# Patient Record
Sex: MALE | Race: WHITE | NOT HISPANIC OR LATINO | Employment: FULL TIME | URBAN - METROPOLITAN AREA
[De-identification: names, ages, dates, MRNs, and addresses within clinical notes are randomized per-mention and may not be internally consistent; named-entity substitution may affect disease eponyms.]

---

## 2017-03-06 ENCOUNTER — OFFICE VISIT (OUTPATIENT)
Dept: FAMILY MEDICINE | Facility: CLINIC | Age: 52
End: 2017-03-06

## 2017-03-06 VITALS
HEART RATE: 107 BPM | WEIGHT: 197 LBS | OXYGEN SATURATION: 96 % | BODY MASS INDEX: 26.11 KG/M2 | HEIGHT: 73 IN | SYSTOLIC BLOOD PRESSURE: 138 MMHG | DIASTOLIC BLOOD PRESSURE: 80 MMHG

## 2017-03-06 DIAGNOSIS — Z12.5 SCREENING FOR PROSTATE CANCER: ICD-10-CM

## 2017-03-06 DIAGNOSIS — I10 BENIGN ESSENTIAL HYPERTENSION: ICD-10-CM

## 2017-03-06 DIAGNOSIS — J38.3 ABDUCTOR SPASMODIC DYSPHONIA: ICD-10-CM

## 2017-03-06 DIAGNOSIS — E78.5 HYPERLIPIDEMIA WITH TARGET LDL LESS THAN 100: ICD-10-CM

## 2017-03-06 DIAGNOSIS — Z00.00 ROUTINE GENERAL MEDICAL EXAMINATION AT A HEALTH CARE FACILITY: Primary | ICD-10-CM

## 2017-03-06 DIAGNOSIS — Z11.59 NEED FOR HEPATITIS C SCREENING TEST: ICD-10-CM

## 2017-03-06 LAB
% GRANULOCYTES: 62.1 % (ref 42.2–75.2)
HCT VFR BLD AUTO: 47.4 % (ref 39–51)
HEMOGLOBIN: 15.6 G/DL (ref 13.4–17.5)
LYMPHOCYTES NFR BLD AUTO: 28.6 % (ref 20.5–51.1)
MCH RBC QN AUTO: 31.2 PG (ref 27–31)
MCHC RBC AUTO-ENTMCNC: 32.9 G/DL (ref 33–37)
MCV RBC AUTO: 94.9 FL (ref 80–100)
MONOCYTES NFR BLD AUTO: 9.3 % (ref 1.7–9.3)
PLATELET # BLD AUTO: 196 K/UL (ref 140–450)
RBC # BLD AUTO: 4.99 X10/CMM (ref 4.2–5.9)
WBC # BLD AUTO: 2.7 X10/CMM (ref 3.8–11)

## 2017-03-06 PROCEDURE — 84153 ASSAY OF PSA TOTAL: CPT | Mod: 90 | Performed by: FAMILY MEDICINE

## 2017-03-06 PROCEDURE — 36415 COLL VENOUS BLD VENIPUNCTURE: CPT | Performed by: FAMILY MEDICINE

## 2017-03-06 PROCEDURE — 99213 OFFICE O/P EST LOW 20 MIN: CPT | Mod: 25 | Performed by: FAMILY MEDICINE

## 2017-03-06 PROCEDURE — 86803 HEPATITIS C AB TEST: CPT | Mod: 90 | Performed by: FAMILY MEDICINE

## 2017-03-06 PROCEDURE — 80053 COMPREHEN METABOLIC PANEL: CPT | Mod: 90 | Performed by: FAMILY MEDICINE

## 2017-03-06 PROCEDURE — 85025 COMPLETE CBC W/AUTO DIFF WBC: CPT | Performed by: FAMILY MEDICINE

## 2017-03-06 PROCEDURE — 80061 LIPID PANEL: CPT | Mod: 90 | Performed by: FAMILY MEDICINE

## 2017-03-06 PROCEDURE — 99396 PREV VISIT EST AGE 40-64: CPT | Performed by: FAMILY MEDICINE

## 2017-03-06 RX ORDER — LISINOPRIL/HYDROCHLOROTHIAZIDE 10-12.5 MG
1 TABLET ORAL DAILY
Qty: 90 TABLET | Refills: 3 | Status: SHIPPED | OUTPATIENT
Start: 2017-03-06 | End: 2019-03-15

## 2017-03-06 NOTE — MR AVS SNAPSHOT
After Visit Summary   3/6/2017    Yimi Gibbs    MRN: [id number]           Patient Information     Date Of Birth          1965        Visit Information        Provider Department      3/6/2017 12:30 PM Omar Matias MD [facility]        Today's Diagnoses     Routine general medical examination at a health care facility    -  1    Abductor spasmodic dysphonia        Hyperlipidemia with target LDL less than 100        Benign essential hypertension        Screening for prostate cancer          Care Instructions      Preventive Health Recommendations  Male Ages 50 - 64    Yearly exam:             See your health care provider every year in order to  o   Review health changes.   o   Discuss preventive care.    o   Review your medicines if your doctor has prescribed any.     Have a cholesterol test every 5 years, or more frequently if you are at risk for high cholesterol/heart disease.     Have a diabetes test (fasting glucose) every three years. If you are at risk for diabetes, you should have this test more often.     Have a colonoscopy at age 50, or have a yearly FIT test (stool test). These exams will check for colon cancer.      Talk with your health care provider about whether or not a prostate cancer screening test (PSA) is right for you.    You should be tested each year for STDs (sexually transmitted diseases), if you re at risk.     Shots: Get a flu shot each year. Get a tetanus shot every 10 years.     Nutrition:    Eat at least 5 servings of fruits and vegetables daily.     Eat whole-grain bread, whole-wheat pasta and brown rice instead of white grains and rice.     Talk to your provider about Calcium and Vitamin D.     Lifestyle    Exercise for at least 150 minutes a week (30 minutes a day, 5 days a week). This will help you control your weight and prevent disease.     Limit alcohol to one drink per day.     No smoking.     Wear sunscreen to prevent skin cancer.  "    See your dentist every six months for an exam and cleaning.     See your eye doctor every 1 to 2 years.          Follow-ups after your visit        Who to contact     If you have questions or need follow up information about today's clinic visit or your schedule please contact [facility] directly at [phone].  Normal or non-critical lab and imaging results will be communicated to you by MyChart, letter or phone within 4 business days after the clinic has received the results. If you do not hear from us within 7 days, please contact the clinic through [employer] or phone. If you have a critical or abnormal lab result, we will notify you by phone as soon as possible.  Submit refill requests through [employer] or call your pharmacy and they will forward the refill request to us. Please allow 3 business days for your refill to be completed.          Additional Information About Your Visit        MyChart Information     [employer] gives you secure access to your electronic health record. If you see a primary care provider, you can also send messages to your care team and make appointments. If you have questions, please call your primary care clinic.  If you do not have a primary care provider, please call [phone] and they will assist you.        Care EveryWhere ID     This is your Care EveryWhere ID. This could be used by other organizations to access your [place] medical records  [id number]        Your Vitals Were     Pulse Height Pulse Oximetry BMI (Body Mass Index)          107 1.842 m (6' 0.5\") 96% 26.35 kg/m2         Blood Pressure from Last 3 Encounters:   03/06/17 138/80   10/21/16 112/70   05/20/16 124/80    Weight from Last 3 Encounters:   03/06/17 89.4 kg (197 lb)   10/21/16 90.7 kg (200 lb)   05/20/16 88.5 kg (195 lb)              We Performed the Following     CBC with Diff/Plt ([facility])     Comp. Metabolic Panel (14) ([facility])     Lipid Panel ([facility])     PSA Serum ([facility])          Where " to get your medicines      These medications were sent to [id number] - [place], [place] - [address] [facility]  [address] [place] [id number]     Phone:  [phone]     lisinopril-hydrochlorothiazide 10-12.5 MG per tablet          Primary Care Provider Office Phone # Fax #    Omar Matias [phone]       [facility] [address]  [facility] [address]        Thank you!     Thank you for choosing [facility]  for your care. Our goal is always to provide you with excellent care. Hearing back from our patients is one way we can continue to improve our services. Please take a few minutes to complete the written survey that you may receive in the mail after your visit with us. Thank you!             Your Updated Medication List - Protect others around you: Learn how to safely use, store and throw away your medicines at www.disposemymeds.org.          This list is accurate as of: 3/6/17  1:02 PM.  Always use your most recent med list.                   Brand Name Dispense Instructions for use    ibuprofen 800 MG tablet    ADVIL/MOTRIN    30 tablet    Take 1 tablet (800 mg) by mouth every 8 hours as needed for moderate pain       lisinopril-hydrochlorothiazide 10-12.5 MG per tablet    PRINZIDE/ZESTORETIC    90 tablet    Take 1 tablet by mouth daily

## 2017-03-06 NOTE — PROGRESS NOTES
SUBJECTIVE:     CC: Yimi Gibbs is an 51 year old male who presents for preventative health visit.     Healthy Habits:    Do you get at least three servings of calcium containing foods daily (dairy, green leafy vegetables, etc.)? yes    Amount of exercise or daily activities, outside of work: 4 day(s) per week    Problems taking medications regularly No    Medication side effects: No    Have you had an eye exam in the past two years? yes    Do you see a dentist twice per year? yes    Do you have sleep apnea, excessive snoring or daytime drowsiness?no            Today's PHQ-2 Score:   PHQ-2 ( 1999 Pfizer) 3/6/2017 11/30/2015   Q1: Little interest or pleasure in doing things - 0   Q2: Feeling down, depressed or hopeless 0 0   PHQ-2 Score - 0       Abuse: Current or Past(Physical, Sexual or Emotional)- No  Do you feel safe in your environment - Yes    Social History   Substance Use Topics     Smoking status: Never Smoker     Smokeless tobacco: Never Used     Alcohol use Yes      Comment: socially     The patient does not drink >3 drinks per day nor >7 drinks per week.    Last PSA: No results found for: PSA    Recent Labs   Lab Test  05/20/16   0841  11/30/15   1125   CHOL  219*  245*   HDL  58  57   LDL  142*  170*   TRIG  94  89       Reviewed orders with patient. Reviewed health maintenance and updated orders accordingly - Yes    Reviewed and updated as needed this visit by clinical staff  Tobacco  Allergies  Meds         Reviewed and updated as needed this visit by Provider        Past Medical History   Diagnosis Date     Abductor spasmodic dysphonia      Hyperlipidaemia LDL goal < 100      Hypertension       Past Surgical History   Procedure Laterality Date     Bunionectomy  2010     Septoplasty  2007     Thyroplasty  2001       ROS:  C: NEGATIVE for fever, chills, change in weight  I: NEGATIVE for worrisome rashes, moles or lesions  E: NEGATIVE for vision changes or irritation  ENT: NEGATIVE for  "ear, mouth and throat problems  R: NEGATIVE for significant cough or SOB  CV: NEGATIVE for chest pain, palpitations or peripheral edema  GI: NEGATIVE for nausea, abdominal pain, heartburn, or change in bowel habits   male: negative for dysuria, hematuria, decreased urinary stream, erectile dysfunction, urethral discharge  M: NEGATIVE for significant arthralgias or myalgia  N: NEGATIVE for weakness, dizziness or paresthesias  P: NEGATIVE for changes in mood or affect    Patient Active Problem List   Diagnosis     Abductor spasmodic dysphonia     Hyperlipidemia with target LDL less than 100     Dysphonia     Past Surgical History   Procedure Laterality Date     Bunionectomy  2010     Septoplasty  2007     Thyroplasty  2001       Social History   Substance Use Topics     Smoking status: Never Smoker     Smokeless tobacco: Never Used     Alcohol use Yes      Comment: socially     Family History   Problem Relation Age of Onset     Prostate Cancer Mother          OBJECTIVE:     /80  Pulse 107  Ht 1.842 m (6' 0.5\")  Wt 89.4 kg (197 lb)  SpO2 96%  BMI 26.35 kg/m2  EXAM:  GENERAL: healthy, alert and no distress  EYES: Eyes grossly normal to inspection, PERRL and conjunctivae and sclerae normal  HENT: ear canals and TM's normal, nose and mouth without ulcers or lesions  NECK: no adenopathy, no asymmetry, masses, or scars and thyroid normal to palpation  RESP: lungs clear to auscultation - no rales, rhonchi or wheezes  BREAST: normal without masses, tenderness or nipple discharge and no palpable axillary masses or adenopathy  CV: regular rate and rhythm, normal S1 S2, no S3 or S4, no murmur, click or rub, no peripheral edema and peripheral pulses strong  ABDOMEN: soft, nontender, no hepatosplenomegaly, no masses and bowel sounds normal   (male): normal male genitalia without lesions or urethral discharge, no hernia  RECTAL: normal sphincter tone, no rectal masses, prostate normal size, smooth, nontender without " "nodules or masses  MS: no gross musculoskeletal defects noted, no edema  SKIN: no suspicious lesions or rashes  NEURO: Normal strength and tone, mentation intact and speech normal  PSYCH: mentation appears normal, affect normal/bright  Voice is raspy and has fungal infections in each big toe nail    ASSESSMENT/PLAN:     Yimi was seen today for physical.    Diagnoses and all orders for this visit:    Routine general medical examination at a health care facility        COUNSELING:  Reviewed preventive health counseling, as reflected in patient instructions       Regular exercise       Healthy diet/nutrition    BP Screening:   Last 3 BP Readings:    BP Readings from Last 3 Encounters:   03/06/17 138/80   10/21/16 112/70   05/20/16 124/80       The following was recommended to the patient:  Re-screen BP within a year and recommended lifestyle modifications     reports that he has never smoked. He has never used smokeless tobacco.    Estimated body mass index is 26.35 kg/(m^2) as calculated from the following:    Height as of this encounter: 1.842 m (6' 0.5\").    Weight as of this encounter: 89.4 kg (197 lb).       Counseling Resources:  ATP IV Guidelines  Pooled Cohorts Equation Calculator  FRAX Risk Assessment  ICSI Preventive Guidelines  Dietary Guidelines for Americans, 2010  USDA's MyPlate  ASA Prophylaxis  Lung CA Screening    Omar Matias MD  [facility]  "

## 2017-03-06 NOTE — PROGRESS NOTES
Problem(s) Oriented visit    Besides the Wellness Exam he is here to discuss the status of the following problem(s)  Subjective:  1. Benign essential hypertension  Blood presure remains well controlled when checked out of clinic.    Reviewed last 6 BP readings in chart:  BP Readings from Last 6 Encounters:   03/06/17 138/80   10/21/16 112/70   05/20/16 124/80   01/25/16 130/78   12/28/15 (!) 148/98   11/30/15 (!) 142/100       he has not experienced any significant side effects from medications for hypertension.    NO active cardiac complaints or symptoms with exercise.    2. Abductor spasmodic dysphonia  Notes reveiwed from the [employer] [place]    3. Hyperlipidemia with target LDL less than 100  Has history of hyperlipidemia.  Not on statin for this, reports pretty good diet complience  Latest labs reviewed:    Recent Labs   Lab Test  05/20/16   0841  11/30/15   1125   CHOL  219*  245*   HDL  58  57   LDL  142*  170*   TRIG  94  89        Lab Results   Component Value Date     11/30/2015        - Lipid Panel ([facility])      ROS:  General and Resp. completed and negative except as noted above     HISTORY:   reports that he has never smoked. He has never used smokeless tobacco.    EXAM:  BP: 138/80   Pulse: 107    Temp: Data Unavailable    Wt Readings from Last 2 Encounters:   03/06/17 89.4 kg (197 lb)   10/21/16 90.7 kg (200 lb)       BMI= Body mass index is 26.35 kg/(m^2).    EXAM:  APPEARANCE: = Relaxed and in no distress      Assessment/Plan:    Abductor spasmodic dysphonia    Hyperlipidemia with target LDL less than 100  -     Lipid Panel ([facility])  Discussed current lipid results, previous results (if available) current guidelines (NCEP) for treatment and goals for lipids.  Discussed lifestyle modification, dietary changes (low fat, low simple carb) and regular aerobic exercise.  Discussed the link between dysmetabolic syndrome and impaired glucose tolerance seen in certain patterns of lipids.  Briefly  discussed medication used for lipid lowering, including the statins are their possible side effects of myalgias, rhabdomyolysis, and liver toxicity.    Benign essential hypertension  Discussed hypertension in detail including JNC VIII guidelines for blood pressure goals.  Discussed indication for treatment and treatment options.  Discussed the importance for aggressive management of HTN to prevent vascular complications later.  Recommended lower fat, lower carbohydrate, and lower sodium (<2000 mg)diet.  Discussed required intervals for follow up on HTN, lab studies, and the need to aggresive management of other cardiac disease risk factors.  Recommened pt. follow their blood pressures outside the clinic to ensure that BPs are remaining within guidelines, and to contact me if the readings are not within guidelines so we can adjust treatment as needed.    -     CBC with Diff/Plt ([facility])  -     lisinopril-hydrochlorothiazide (PRINZIDE/ZESTORETIC) 10-12.5 MG per tablet; Take 1 tablet by mouth daily        Reviewed patients plan of care and provided an AVS.   Omar Matias  [facility]  [phone]   For any issues my office # is [phone]

## 2017-03-07 LAB
ALBUMIN SERPL-MCNC: 4.8 G/DL (ref 3.5–5.5)
ALBUMIN/GLOB SERPL: 1.8 {RATIO} (ref 1.1–2.5)
ALP SERPL-CCNC: 62 IU/L (ref 39–117)
ALT SERPL-CCNC: 80 IU/L (ref 0–44)
AST SERPL-CCNC: 60 IU/L (ref 0–40)
BILIRUB SERPL-MCNC: 0.6 MG/DL (ref 0–1.2)
BUN SERPL-MCNC: 13 MG/DL (ref 6–24)
BUN/CREATININE RATIO: 16 (ref 9–20)
CALCIUM SERPL-MCNC: 9.4 MG/DL (ref 8.7–10.2)
CHLORIDE SERPLBLD-SCNC: 99 MMOL/L (ref 96–106)
CHOLEST SERPL-MCNC: 227 MG/DL (ref 100–199)
CREAT SERPL-MCNC: 0.8 MG/DL (ref 0.76–1.27)
EGFR IF AFRICN AM: 120 ML/MIN/1.73
EGFR IF NONAFRICN AM: 104 ML/MIN/1.73
GLOBULIN, TOTAL: 2.7 G/DL (ref 1.5–4.5)
GLUCOSE SERPL-MCNC: 96 MG/DL (ref 65–99)
HDLC SERPL-MCNC: 63 MG/DL
LDL/HDL RATIO: 2.3 RATIO UNITS (ref 0–3.6)
LDLC SERPL CALC-MCNC: 145 MG/DL (ref 0–99)
POTASSIUM SERPL-SCNC: 4.3 MMOL/L (ref 3.5–5.2)
PROT SERPL-MCNC: 7.5 G/DL (ref 6–8.5)
PSA NG/ML: 0.8 NG/ML (ref 0–4)
SODIUM SERPL-SCNC: 142 MMOL/L (ref 134–144)
TOTAL CO2: 23 MMOL/L (ref 18–28)
TRIGL SERPL-MCNC: 96 MG/DL (ref 0–149)
VLDLC SERPL CALC-MCNC: 19 MG/DL (ref 5–40)

## 2017-03-07 NOTE — PROGRESS NOTES
Dear Yimi,   I am writing to report that your included test results are within expected ranges. I do not suggest that we make any changes at this time.    Omar Matias M.D.

## 2017-03-08 LAB — HCV AB SERPL QL IA: 0.1 S/CO RATIO (ref 0–0.9)

## 2018-01-30 ENCOUNTER — OFFICE VISIT (OUTPATIENT)
Dept: FAMILY MEDICINE | Facility: CLINIC | Age: 53
End: 2018-01-30

## 2018-01-30 VITALS
OXYGEN SATURATION: 96 % | WEIGHT: 204.6 LBS | DIASTOLIC BLOOD PRESSURE: 70 MMHG | HEART RATE: 73 BPM | RESPIRATION RATE: 16 BRPM | SYSTOLIC BLOOD PRESSURE: 140 MMHG | TEMPERATURE: 98.2 F | BODY MASS INDEX: 26.26 KG/M2 | HEIGHT: 74 IN

## 2018-01-30 DIAGNOSIS — N52.9 ERECTILE DYSFUNCTION, UNSPECIFIED ERECTILE DYSFUNCTION TYPE: ICD-10-CM

## 2018-01-30 DIAGNOSIS — R05.9 COUGH: Primary | ICD-10-CM

## 2018-01-30 PROCEDURE — 99213 OFFICE O/P EST LOW 20 MIN: CPT | Performed by: FAMILY MEDICINE

## 2018-01-30 PROCEDURE — 71046 X-RAY EXAM CHEST 2 VIEWS: CPT | Performed by: FAMILY MEDICINE

## 2018-01-30 NOTE — MR AVS SNAPSHOT
"              After Visit Summary   1/30/2018    Yimi Gibbs    MRN: [id number]           Patient Information     Date Of Birth          1965        Visit Information        Provider Department      1/30/2018 3:00 PM Omar Matias MD [facility]        Today's Diagnoses     Cough    -  1    Erectile dysfunction, unspecified erectile dysfunction type           Follow-ups after your visit        Who to contact     If you have questions or need follow up information about today's clinic visit or your schedule please contact [facility] directly at [phone].  Normal or non-critical lab and imaging results will be communicated to you by [employer], letter or phone within 4 business days after the clinic has received the results. If you do not hear from us within 7 days, please contact the clinic through [employer] or phone. If you have a critical or abnormal lab result, we will notify you by phone as soon as possible.  Submit refill requests through [employer] or call your pharmacy and they will forward the refill request to us. Please allow 3 business days for your refill to be completed.          Additional Information About Your Visit        MyChart Information     [employer] gives you secure access to your electronic health record. If you see a primary care provider, you can also send messages to your care team and make appointments. If you have questions, please call your primary care clinic.  If you do not have a primary care provider, please call [phone] and they will assist you.        Care EveryWhere ID     This is your Care EveryWhere ID. This could be used by other organizations to access your [place] medical records  [id number]        Your Vitals Were     Pulse Temperature Respirations Height Pulse Oximetry BMI (Body Mass Index)    73 98.2  F (36.8  C) 16 1.867 m (6' 1.5\") 96% 26.63 kg/m2       Blood Pressure from Last 3 Encounters:   01/30/18 140/70   03/06/17 " 138/80   10/21/16 112/70    Weight from Last 3 Encounters:   01/30/18 92.8 kg (204 lb 9.6 oz)   03/06/17 89.4 kg (197 lb)   10/21/16 90.7 kg (200 lb)              We Performed the Following     X-ray Chest 2 vws*        Primary Care Provider Office Phone # Fax #    Omar Matias [phone] [address]  [facility] [address]        Equal Access to Services     [facility]: Hadii aad ku hadasho Soomaali, waaxda luqadaha, qaybta kaalmada adeegyada, waxay marcus hayfouzian love pelayo . So [facility] [phone].    ATENCIÓN: Si habla español, tiene a knight disposición servicios gratuitos de asistencia lingüística. Llame al [phone].    We comply with applicable federal civil rights laws and [place] laws. We do not discriminate on the basis of race, color, national origin, age, disability, sex, sexual orientation, or gender identity.            Thank you!     Thank you for choosing [facility]  for your care. Our goal is always to provide you with excellent care. Hearing back from our patients is one way we can continue to improve our services. Please take a few minutes to complete the written survey that you may receive in the mail after your visit with us. Thank you!             Your Updated Medication List - Protect others around you: Learn how to safely use, store and throw away your medicines at www.disposemymeds.org.          This list is accurate as of 1/30/18  5:53 PM.  Always use your most recent med list.                   Brand Name Dispense Instructions for use Diagnosis    ibuprofen 800 MG tablet    ADVIL/MOTRIN    30 tablet    Take 1 tablet (800 mg) by mouth every 8 hours as needed for moderate pain    Back pain       lisinopril-hydrochlorothiazide 10-12.5 MG per tablet    PRINZIDE/ZESTORETIC    90 tablet    Take 1 tablet by mouth daily    Benign essential hypertension

## 2018-01-30 NOTE — PROGRESS NOTES
6-7 weeks of ongoing cough.  The patient complains of cough non-productive, without wheezing, dyspnea or hemoptysis, productive of clear sputum, waxing and waning over time for 6-7 weeks.  Quality: dry  Severity: mild to moderate  Associated symptoms: no fever, chills,  House is quite dry. Allergy meds helps.      Problem(s) Oriented visit      ROS:  General and CV completed and negative except as noted above     HISTORY:   reports that he drinks alcohol.   reports that he has never smoked. He has never used smokeless tobacco.    Past Medical History:   Diagnosis Date     Abductor spasmodic dysphonia      Hyperlipidaemia LDL goal < 100      Hypertension      Past Surgical History:   Procedure Laterality Date     BUNIONECTOMY  2010     SEPTOPLASTY  2007     THYROPLASTY  2001       EXAM:  BP: 140/70   Pulse: 73    Temp: 98.2    Wt Readings from Last 2 Encounters:   01/30/18 92.8 kg (204 lb 9.6 oz)   03/06/17 89.4 kg (197 lb)       BMI= Body mass index is 26.63 kg/(m^2).    EXAM:  APPEARANCE: = Relaxed and in no distress  Conj/Eyelids = noninjected and lids and lashes are without inflammation  PERRLA/Irises = Pupils Round Reactive to Light and Irisis without inflammation  Ears/Nose = External structures and Nares have usual shape and form  Ear canals and TM's = Canals are not inflammed and have none or little wax and the drums are not injected and have a light reflex   Lips/Teeth/Gums = No lesions seen, good dentition, and gums seem healthy  Oropharynx = No leukoplakia, No injection to the tissues, Normal Uvula  Neck = No anterior or posterior adenopathy appreciated.  Resp effort = Calm regular breathing  Breath Sounds = Good air movement with no rales or rhonchi in any lung fields  Mood/Affect = Cooperative and interested      Assessment/Plan:  Yimi was seen today for cough.    Diagnoses and all orders for this visit:    Cough  -     X-ray Chest 2 vws*  Cough is likely allergic.    Erectile dysfunction, unspecified  "erectile dysfunction type    samples given for viagra and levitra    COUNSELING:   reports that he has never smoked. He has never used smokeless tobacco.    Estimated body mass index is 26.63 kg/(m^2) as calculated from the following:    Height as of this encounter: 1.867 m (6' 1.5\").    Weight as of this encounter: 92.8 kg (204 lb 9.6 oz).       Appropriate preventive services were discussed with this patient, including applicable screening as appropriate for cardiovascular disease, diabetes, osteopenia/osteoporosis, and glaucoma.  As appropriate for age/gender, discussed screening for colorectal cancer, prostate cancer, breast cancer, and cervical cancer. Checklist reviewing preventive services available has been given to the patient.    Reviewed patients plan of care and provided an AVS. The Basic Care Plan (routine screening as documented in Health Maintenance) for Yimi meets the Care Plan requirement. This Care Plan has been established and reviewed with the  Patient.      The following health maintenance items are reviewed in Epic and correct as of today:  Health Maintenance   Topic Date Due     TETANUS IMMUNIZATION (SYSTEM ASSIGNED)  05/19/2018     LIPID SCREEN Q5 YR MALE (SYSTEM ASSIGNED)  03/06/2022     COLON CANCER SCREEN (SYSTEM ASSIGNED)  11/09/2026     INFLUENZA VACCINE (SYSTEM ASSIGNED)  Addressed       Omar Matias  [facility]  For any issues my office # is [phone]            "

## 2019-03-15 ENCOUNTER — OFFICE VISIT (OUTPATIENT)
Dept: FAMILY MEDICINE | Facility: CLINIC | Age: 54
End: 2019-03-15

## 2019-03-15 VITALS
DIASTOLIC BLOOD PRESSURE: 98 MMHG | OXYGEN SATURATION: 97 % | HEART RATE: 75 BPM | WEIGHT: 204 LBS | RESPIRATION RATE: 16 BRPM | BODY MASS INDEX: 26.55 KG/M2 | SYSTOLIC BLOOD PRESSURE: 148 MMHG | TEMPERATURE: 98.4 F

## 2019-03-15 DIAGNOSIS — Z98.52 VASECTOMY STATUS: ICD-10-CM

## 2019-03-15 DIAGNOSIS — I10 BENIGN ESSENTIAL HYPERTENSION: ICD-10-CM

## 2019-03-15 DIAGNOSIS — J45.998 CHRONIC ALLERGIC BRONCHITIS: Primary | ICD-10-CM

## 2019-03-15 PROCEDURE — 99214 OFFICE O/P EST MOD 30 MIN: CPT | Performed by: FAMILY MEDICINE

## 2019-03-15 RX ORDER — ALBUTEROL SULFATE 90 UG/1
2 AEROSOL, METERED RESPIRATORY (INHALATION) EVERY 6 HOURS
Qty: 8.5 G | Refills: 11 | Status: SHIPPED | OUTPATIENT
Start: 2019-03-15 | End: 2020-01-20

## 2019-03-15 RX ORDER — LISINOPRIL/HYDROCHLOROTHIAZIDE 10-12.5 MG
1 TABLET ORAL DAILY
Qty: 90 TABLET | Refills: 3 | Status: SHIPPED | OUTPATIENT
Start: 2019-03-15 | End: 2019-05-15

## 2019-03-15 RX ORDER — LORATADINE 10 MG/1
10 TABLET ORAL DAILY
COMMUNITY
End: 2020-09-10

## 2019-03-15 RX ORDER — MONTELUKAST SODIUM 10 MG/1
10 TABLET ORAL AT BEDTIME
Qty: 30 TABLET | Refills: 11 | Status: SHIPPED | OUTPATIENT
Start: 2019-03-15 | End: 2020-01-20

## 2019-03-15 RX ORDER — FLUTICASONE PROPIONATE 50 MCG
2 SPRAY, SUSPENSION (ML) NASAL DAILY
COMMUNITY
End: 2020-01-20

## 2019-03-15 NOTE — PROGRESS NOTES
SUBJECTIVE:   Yimi Gibbs is a 53 year old male presenting with a chief complaint of runny nose, stuffy nose, cough - non-productive, wheezing, shortness of breath, hoarse voice and itchy/watery eyes.  Onset of symptoms was 8 month(s) ago.  Course of illness is same.    Severity moderate  Current and Associated symptoms: Pt states his symptoms get better when he's away from the home and animals and once he comes back into the home he is instantly itching and allergic symptoms return.  Treatment measures tried include Decongestants, Antihistamine, Inhaler (name: albuterol) and Flonase, loratadine 10mg and eye drops.  Predisposing factors include  [employer] Pig and bird.    PMH singificant for vocal cord surgery for abscess and resulting hoarseness    Past Medical History:   Diagnosis Date     Abductor spasmodic dysphonia      Hyperlipidaemia LDL goal < 100      Hypertension      Current Outpatient Medications   Medication Sig Dispense Refill     ibuprofen (ADVIL,MOTRIN) 800 MG tablet Take 1 tablet (800 mg) by mouth every 8 hours as needed for moderate pain 30 tablet 1     lisinopril-hydrochlorothiazide (PRINZIDE/ZESTORETIC) 10-12.5 MG per tablet Take 1 tablet by mouth daily (Patient not taking: Reported on 3/15/2019) 90 tablet 3     Social History     Tobacco Use     Smoking status: Never Smoker     Smokeless tobacco: Never Used   Substance Use Topics     Alcohol use: Yes     Comment: socially     ROS:  CONSTITUTIONAL:NEGATIVE for fever, chills, change in weight  INTEGUMENTARY/SKIN: NEGATIVE for worrisome rashes, moles or lesions    OBJECTIVE:  BP (!) 148/98   Pulse 75   Temp 98.4  F (36.9  C) (Temporal)   Resp 16   Wt 92.5 kg (204 lb)   SpO2 97%   BMI 26.55 kg/m    GENERAL APPEARANCE: healthy, alert and no distress  EYES: EOMI,  PERRL, conjunctiva clear  HENT: ear canals and TM's normal.  Nose and mouth without ulcers, erythema or lesions; hoarse voice  NECK: supple, nontender, no lymphadenopathy  RESP:  lungs clear to auscultation - no rales, rhonchi or wheezes  CV: regular rates and rhythm, normal S1 S2, no murmur noted  NEURO: Normal strength and tone, sensory exam grossly normal,  normal speech and mentation  SKIN: no suspicious lesions or rashes    ASSESSMENT:  1. Benign essential hypertension  Restart BP medication and must take daily    Get cuff and report back ot me in 2 weeks BP control numbers  - lisinopril-hydrochlorothiazide (PRINZIDE/ZESTORETIC) 10-12.5 MG tablet; Take 1 tablet by mouth daily  Dispense: 90 tablet; Refill: 3    2. Chronic allergic bronchitis  Start singulair    otc meds and albuterol as well   Pt instructed to come back to the clinic for worsening sx    - montelukast (SINGULAIR) 10 MG tablet; Take 1 tablet (10 mg) by mouth At Bedtime  Dispense: 30 tablet; Refill: 11  - albuterol (PROAIR HFA/PROVENTIL HFA/VENTOLIN HFA) 108 (90 Base) MCG/ACT inhaler; Inhale 2 puffs into the lungs every 6 hours  Dispense: 8.5 g; Refill: 11    3. Vasectomy status  Check labs  - Post-Vasectomy Analysis (CATERINA); Future

## 2019-04-19 DIAGNOSIS — Z98.52 VASECTOMY STATUS: ICD-10-CM

## 2019-04-19 LAB
ABSTINENCE DAYS: 7 DAYS (ref 2–7)
AGGLUTINATION: NO YES/NO
ANALYSIS TEMP - CENTIGRADE: 23 CENTIGRADE
CELL FRAGMENTS: ABNORMAL %
COLLECTION METHOD: ABNORMAL
COLLECTION SITE: ABNORMAL
CONSENT TO RELEASE TO PARTNER: YES
IMMATURE SPERM: ABNORMAL %
IMMOTILE: 0 %
LAB RECEIPT TIME: ABNORMAL
LIQUEFIED: YES YES/NO
NON-PROGRESSIVE MOTILITY: 0 %
PROGRESSIVE MOTILITY: 0 % (ref 32–?)
ROUND CELLS: 0 MILLION/ML (ref ?–2)
SPECIMEN CONCENTRATION: 0 MILLION/ML (ref 15–?)
SPECIMEN PH: 7.2 PH (ref 7.2–?)
SPECIMEN TYPE: ABNORMAL
SPECIMEN VOL UR: 0.4 ML (ref 1.5–?)
TIME OF ANALYSIS: ABNORMAL
TOTAL NUMBER: 0 MILLION (ref 39–?)
TOTAL PROGRESSIVE MOTILE: 0 MILLION (ref 15.6–?)
VISCOUS: NO YES/NO
WBC SPECIMEN: ABNORMAL %

## 2019-04-19 PROCEDURE — 89321 SEMEN ANAL SPERM DETECTION: CPT

## 2019-05-15 ENCOUNTER — OFFICE VISIT (OUTPATIENT)
Dept: FAMILY MEDICINE | Facility: CLINIC | Age: 54
End: 2019-05-15

## 2019-05-15 VITALS
HEART RATE: 45 BPM | OXYGEN SATURATION: 96 % | BODY MASS INDEX: 26.11 KG/M2 | SYSTOLIC BLOOD PRESSURE: 152 MMHG | WEIGHT: 197 LBS | DIASTOLIC BLOOD PRESSURE: 110 MMHG | HEIGHT: 73 IN

## 2019-05-15 DIAGNOSIS — E78.5 HYPERLIPIDEMIA WITH TARGET LDL LESS THAN 100: ICD-10-CM

## 2019-05-15 DIAGNOSIS — I10 ESSENTIAL HYPERTENSION: ICD-10-CM

## 2019-05-15 DIAGNOSIS — J98.01 BRONCHOSPASM: ICD-10-CM

## 2019-05-15 DIAGNOSIS — Z00.00 PREVENTATIVE HEALTH CARE: Primary | ICD-10-CM

## 2019-05-15 PROCEDURE — 36415 COLL VENOUS BLD VENIPUNCTURE: CPT | Performed by: FAMILY MEDICINE

## 2019-05-15 PROCEDURE — 80061 LIPID PANEL: CPT | Mod: 90 | Performed by: FAMILY MEDICINE

## 2019-05-15 PROCEDURE — 80048 BASIC METABOLIC PNL TOTAL CA: CPT | Mod: 90 | Performed by: FAMILY MEDICINE

## 2019-05-15 PROCEDURE — 99396 PREV VISIT EST AGE 40-64: CPT | Performed by: FAMILY MEDICINE

## 2019-05-15 RX ORDER — AMLODIPINE BESYLATE 10 MG/1
10 TABLET ORAL DAILY
Qty: 30 TABLET | Refills: 0 | Status: SHIPPED | OUTPATIENT
Start: 2019-05-15 | End: 2020-01-20

## 2019-05-15 ASSESSMENT — MIFFLIN-ST. JEOR: SCORE: 1789.53

## 2019-05-15 NOTE — PROGRESS NOTES
SUBJECTIVE:   CC: Yimi Gibbs is an 53 year old male who presents for preventative health visit.     HPI    PROBLEMS TO ADD ON...chronic tn, cholesterol, bronchspasm    Today's PHQ-2 Score:   PHQ-2 ( 1999 Pfizer) 3/6/2017   Q1: Little interest or pleasure in doing things -   Q2: Feeling down, depressed or hopeless 0   PHQ-2 Score -       Abuse: Current or Past(Physical, Sexual or Emotional)- No  Do you feel safe in your environment? Yes    Social History     Tobacco Use     Smoking status: Never Smoker     Smokeless tobacco: Never Used   Substance Use Topics     Alcohol use: Yes     Comment: socially     If you drink alcohol do you typically have >3 drinks per day or >7 drinks per week? Yes      Alcohol Use 3/6/2017   Prescreen: >3 drinks/day or >7 drinks/week? The patient does not drink >3 drinks per day nor >7 drinks per week.   No flowsheet data found.    Last PSA: No results found for: PSA    Reviewed orders with patient. Reviewed health maintenance and updated orders accordingly - Yes  Labs reviewed in EPIC    Reviewed and updated as needed this visit by clinical staff       Reviewed and updated as needed this visit by Provider        Review of Systems  CONSTITUTIONAL: NEGATIVE for fever, chills, change in weight  INTEGUMENTARY/SKIN: NEGATIVE for worrisome rashes, moles or lesions  EYES: NEGATIVE for vision changes or irritation  ENT: NEGATIVE for new ear, mouth and throat problems  RESP: NEGATIVE for increasing cough or SOB  CV: NEGATIVE for chest pain, palpitations or peripheral edema  GI: NEGATIVE for nausea, abdominal pain, heartburn, or change in bowel habits   male: negative for dysuria, hematuria, decreased urinary stream, erectile dysfunction, urethral discharge  MUSCULOSKELETAL: NEGATIVE for significant arthralgias or myalgia  NEURO: NEGATIVE for weakness, dizziness or paresthesias  PSYCHIATRIC: NEGATIVE for changes in mood or affect    OBJECTIVE:   BP (!) 152/110   Pulse (!) 45   Ht  "1.842 m (6' 0.5\")   Wt 89.4 kg (197 lb)   SpO2 96%   BMI 26.35 kg/m      Physical Exam  GENERAL: healthy, alert and no distress  EYES: Eyes grossly normal to inspection, PERRL and conjunctivae and sclerae normal  HENT: normal cephalic/atraumatic, ear canals and TM's normal, nose and mouth without ulcers or lesions, oropharynx clear, oral mucous membranes moist and raspy voice  NECK: no adenopathy, no asymmetry, masses, or scars and thyroid normal to palpation  RESP: lungs clear to auscultation - no rales, rhonchi or wheezes  CV: regular rate and rhythm, normal S1 S2, no S3 or S4, no murmur, click or rub, no peripheral edema and peripheral pulses strong  ABDOMEN: soft, nontender, no hepatosplenomegaly, no masses and bowel sounds normal  MS: no gross musculoskeletal defects noted, no edema  SKIN: no suspicious lesions or rashes  NEURO: Normal strength and tone, mentation intact and speech normal  PSYCH: mentation appears normal, affect normal/bright    Diagnostic Test Results:  none     ASSESSMENT/PLAN:       ICD-10-CM    1. Preventative health care Z00.00    2. Hyperlipidemia with target LDL less than 100 E78.5 Lipid Panel ([facility])   3. Essential hypertension I10 Basic Metabolic Panel (8) ([facility])     amLODIPine (NORVASC) 10 MG tablet   4. Bronchospasm J98.01 beclomethasone HFA (QVAR REDIHALER) 40 MCG/ACT inhaler     COUNSELING:   Reviewed preventive health counseling, as reflected in patient instructions       Regular exercise       Healthy diet/nutrition       Vision screening       Hearing screening    Estimated body mass index is 26.55 kg/m  as calculated from the following:    Height as of 1/30/18: 1.867 m (6' 1.5\").    Weight as of 3/15/19: 92.5 kg (204 lb).      reports that he has never smoked. He has never used smokeless tobacco.    Increase ccb to 10 mg daily    Mychart message in 2 weeks to me    Counseling Resources:  ATP IV Guidelines  Pooled Cohorts Equation Calculator  FRAX Risk Assessment  ICSI " Preventive Guidelines  Dietary Guidelines for Americans, 2010  USDA's MyPlate  ASA Prophylaxis  Lung CA Screening    Antonio Loving MD  [facility]

## 2019-05-16 LAB
BUN SERPL-MCNC: 16 MG/DL (ref 6–24)
BUN/CREATININE RATIO: 18 (ref 9–20)
CALCIUM SERPL-MCNC: 9.7 MG/DL (ref 8.7–10.2)
CHLORIDE SERPLBLD-SCNC: 98 MMOL/L (ref 96–106)
CHOLEST SERPL-MCNC: 214 MG/DL (ref 100–199)
CREAT SERPL-MCNC: 0.91 MG/DL (ref 0.76–1.27)
EGFR IF AFRICN AM: 112 ML/MIN/1.73
EGFR IF NONAFRICN AM: 97 ML/MIN/1.73
GLUCOSE SERPL-MCNC: 95 MG/DL (ref 65–99)
HDLC SERPL-MCNC: 53 MG/DL
LDL/HDL RATIO: 2.7 RATIO (ref 0–3.6)
LDLC SERPL CALC-MCNC: 144 MG/DL (ref 0–99)
POTASSIUM SERPL-SCNC: 5 MMOL/L (ref 3.5–5.2)
SODIUM SERPL-SCNC: 140 MMOL/L (ref 134–144)
TOTAL CO2: 27 MMOL/L (ref 20–29)
TRIGL SERPL-MCNC: 86 MG/DL (ref 0–149)
VLDLC SERPL CALC-MCNC: 17 MG/DL (ref 5–40)

## 2019-09-29 ENCOUNTER — HEALTH MAINTENANCE LETTER (OUTPATIENT)
Age: 54
End: 2019-09-29

## 2020-01-20 ENCOUNTER — OFFICE VISIT (OUTPATIENT)
Dept: FAMILY MEDICINE | Facility: CLINIC | Age: 55
End: 2020-01-20

## 2020-01-20 VITALS
RESPIRATION RATE: 16 BRPM | WEIGHT: 206.8 LBS | SYSTOLIC BLOOD PRESSURE: 154 MMHG | DIASTOLIC BLOOD PRESSURE: 104 MMHG | OXYGEN SATURATION: 98 % | HEART RATE: 80 BPM | HEIGHT: 73 IN | BODY MASS INDEX: 27.41 KG/M2

## 2020-01-20 DIAGNOSIS — J98.01 BRONCHOSPASM: ICD-10-CM

## 2020-01-20 DIAGNOSIS — J45.998 CHRONIC ALLERGIC BRONCHITIS: ICD-10-CM

## 2020-01-20 DIAGNOSIS — I10 ESSENTIAL HYPERTENSION: ICD-10-CM

## 2020-01-20 PROCEDURE — 93050 ART PRESSURE WAVEFORM ANALYS: CPT | Performed by: FAMILY MEDICINE

## 2020-01-20 PROCEDURE — 99214 OFFICE O/P EST MOD 30 MIN: CPT | Mod: 25 | Performed by: FAMILY MEDICINE

## 2020-01-20 RX ORDER — AMLODIPINE BESYLATE 10 MG/1
10 TABLET ORAL DAILY
Qty: 90 TABLET | Refills: 3 | Status: SHIPPED | OUTPATIENT
Start: 2020-01-20 | End: 2020-09-10

## 2020-01-20 RX ORDER — FLUTICASONE PROPIONATE 50 MCG
2 SPRAY, SUSPENSION (ML) NASAL DAILY
Qty: 16 G | Refills: 11 | Status: SHIPPED | OUTPATIENT
Start: 2020-01-20 | End: 2020-09-10

## 2020-01-20 RX ORDER — ALBUTEROL SULFATE 90 UG/1
2 AEROSOL, METERED RESPIRATORY (INHALATION) EVERY 6 HOURS
Qty: 3 INHALER | Refills: 11 | Status: SHIPPED | OUTPATIENT
Start: 2020-01-20 | End: 2022-10-26

## 2020-01-20 RX ORDER — MONTELUKAST SODIUM 10 MG/1
10 TABLET ORAL AT BEDTIME
Qty: 90 TABLET | Refills: 3 | Status: SHIPPED | OUTPATIENT
Start: 2020-01-20 | End: 2020-09-10

## 2020-01-20 ASSESSMENT — MIFFLIN-ST. JEOR: SCORE: 1840.88

## 2020-01-20 NOTE — PROGRESS NOTES
"Problem(s) Oriented visit        SUBJECTIVE:                                                    Yimi Gibbs is a 54 year old male who presents to clinic today for the following health issues :  Having regular cough throughout the day.  Mainly dry cough, mainly in the mornings, but the nonproductive during rest of day.  Has some occ wheezing , cough worse at bedtime and in cold or humid air.  Also more pronounced with physical activity, especially in the extremes of temperatures or increased levels of exertion.  Feels occasional tightness/mild restriction in breathing.  Specific triggers include heat/humidity, cold air, increased levels of physical exertion, dust/air pollutants, allergens.        Problem list, Medication list, Allergies, and Medical/Social/Surgical histories reviewed in [facility] and updated as appropriate.   Additional history: as documented    ROS:  General and CV completed and negative except as noted above    Histories:   Patient Active Problem List   Diagnosis     Abductor spasmodic dysphonia     Hyperlipidemia with target LDL less than 100     Essential hypertension     Bronchospasm     Past Surgical History:   Procedure Laterality Date     BUNIONECTOMY  2010     SEPTOPLASTY  2007     THYROPLASTY  2001       Social History     Tobacco Use     Smoking status: Never Smoker     Smokeless tobacco: Never Used   Substance Use Topics     Alcohol use: Yes     Comment: socially     Family History   Problem Relation Age of Onset     Prostate Cancer Mother            OBJECTIVE:                                                    BP (!) 154/104   Pulse 80   Resp 16   Ht 1.861 m (6' 1.25\")   Wt 93.8 kg (206 lb 12.8 oz)   SpO2 98%   BMI 27.10 kg/m    Body mass index is 27.1 kg/m .   APPEARANCE: = Relaxed and in no distress  Conj/Eyelids = noninjected and lids and lashes are without inflammation  PERRLA/Irises = Pupils Round Reactive to Light and Irisis without inflammation  Ears/Nose = External " structures and Nares have usual shape and form  Ear canals and TM's = Canals are not inflammed and have none or little wax and the drums are not injected and have a light reflex   Lips/Teeth/Gums = No lesions seen, good dentition, and gums seem healthy  Oropharynx = No leukoplakia, No injection to the tissues, Normal Uvula  Neck = No anterior or posterior adenopathy appreciated.  Resp effort = Calm regular breathing  Breath Sounds =  bilateral wheezing and distant breath sounds  Mood/Affect = Cooperative and interested     ASSESSMENT/PLAN:                                                        Yimi was seen today for breathing problem.    Diagnoses and all orders for this visit:    Essential hypertension  Discussed current hypertension treatment guidelines, including indications for treatment and treatment options.  Discussed the importance for aggressive management of HTN to prevent vascular complications later.  Recommended lower fat, lower carbohydrate, and lower sodium (<2000 mg)diet.  Discussed required intervals for follow up on HTN, lab studies.  Recommened pt. follow their blood pressures outside the clinic to ensure that BPs are remaining within guidelines, and to contact me if the readings are not within guidelines on a regular basis so we can adjust treatment as needed.    -     C ART PRESS WAVEFORM ANALYS CENTRAL ART PRESSURE  -     amLODIPine (NORVASC) 10 MG tablet; Take 1 tablet (10 mg) by mouth daily    Chronic allergic bronchitis  Discussed asthma in detail and discussed how their breathing symptoms and the pattern of the shortness of breath fits with asthma.   Discussed pathophysiology of asthma and treatments based on the degree of symptoms.   Discussed triggers for asthma in general, and those specific to the patient.  Also told them to anticipate potentially more intense coughing and shortness of breath with any URI (regardless of bacterial or viral etiology) and to be prepared to use the  albuterol more often if needed and to return and see me for any such exacerbation.    I recommended having rescue inhaler available and using all throughout the year as needed, demonstrated proper MDI technique for them.  Emphasized the need for them to let me know if there are any changes for the worse in their breathing related to asthma, either acute or chronic and to seek emergency care if the breathing acutely worsens in a sever manner.      -     montelukast (SINGULAIR) 10 MG tablet; Take 1 tablet (10 mg) by mouth At Bedtime  -     albuterol (PROAIR HFA/PROVENTIL HFA/VENTOLIN HFA) 108 (90 Base) MCG/ACT inhaler; Inhale 2 puffs into the lungs every 6 hours  -     fluticasone (FLONASE) 50 MCG/ACT nasal spray; Spray 2 sprays into both nostrils daily    Bronchospasm  Has been off for 6 months.  -     beclomethasone HFA (QVAR REDIHALER) 40 MCG/ACT inhaler; Inhale 1 puff into the lungs 2 times daily        Follow up in 3 months.  See Patient Instructions    The following health maintenance items are reviewed in Epic and correct as of today:  Health Maintenance   Topic Date Due     ASTHMA ACTION PLAN  1965     ASTHMA CONTROL TEST  1965     HIV SCREENING  07/05/1981     ZOSTER IMMUNIZATION (1 of 2) 07/05/2016     DTAP/TDAP/TD IMMUNIZATION (2 - Td) 05/19/2018     INFLUENZA VACCINE (1) 09/01/2019     PHQ-2  01/01/2020     PREVENTIVE CARE VISIT  05/15/2020     LIPID  05/15/2024     COLONOSCOPY  11/09/2026     IPV IMMUNIZATION  Aged Out     MENINGITIS IMMUNIZATION  Aged Out       Omar Matias MD  [facility]  Family Practice  [facility]  [phone]    For any issues my office # is [phone]

## 2020-01-21 ASSESSMENT — ASTHMA QUESTIONNAIRES: ACT_TOTALSCORE: 13

## 2020-09-02 ENCOUNTER — OFFICE VISIT (OUTPATIENT)
Dept: FAMILY MEDICINE | Facility: CLINIC | Age: 55
End: 2020-09-02

## 2020-09-02 VITALS
SYSTOLIC BLOOD PRESSURE: 158 MMHG | DIASTOLIC BLOOD PRESSURE: 100 MMHG | OXYGEN SATURATION: 98 % | BODY MASS INDEX: 27.12 KG/M2 | TEMPERATURE: 98.4 F | RESPIRATION RATE: 16 BRPM | HEART RATE: 77 BPM | WEIGHT: 207 LBS

## 2020-09-02 DIAGNOSIS — I10 ESSENTIAL HYPERTENSION: Primary | ICD-10-CM

## 2020-09-02 DIAGNOSIS — R25.1 TREMOR: ICD-10-CM

## 2020-09-02 DIAGNOSIS — J38.3 ABDUCTOR SPASMODIC DYSPHONIA: ICD-10-CM

## 2020-09-02 DIAGNOSIS — R20.2 PARESTHESIA: ICD-10-CM

## 2020-09-02 PROCEDURE — 93050 ART PRESSURE WAVEFORM ANALYS: CPT | Performed by: FAMILY MEDICINE

## 2020-09-02 PROCEDURE — 99214 OFFICE O/P EST MOD 30 MIN: CPT | Performed by: FAMILY MEDICINE

## 2020-09-02 NOTE — PROGRESS NOTES
SUBJECTIVE:                                                    Yimi Gibbs is a 55 year old male who presents to clinic today for evaluation.  History of spasmodic dysphonia, longstanding.  Reports 10-14 days ago developed numbness and tingling in left 4th and 5th fingers.  No pain.  No weakness.  Also has been noticing increased fast fine tremor in hands.  No tremor elsewhere. No other concerns.        Problem list, Medication list, Allergies, and Medical/Social/Surgical histories reviewed in EPIC and updated as appropriate.   Additional history: as documented    ROS:    A 7 system review was completed and is as noted in HPI and otherwise negative.      Histories:   Patient Active Problem List   Diagnosis     Abductor spasmodic dysphonia     Hyperlipidemia with target LDL less than 100     Essential hypertension     Bronchospasm     Past Surgical History:   Procedure Laterality Date     BUNIONECTOMY  2010     SEPTOPLASTY  2007     THYROPLASTY  2001       Social History     Tobacco Use     Smoking status: Never Smoker     Smokeless tobacco: Never Used   Substance Use Topics     Alcohol use: Yes     Comment: socially     Family History   Problem Relation Age of Onset     Prostate Cancer Mother            OBJECTIVE:                                                    BP (!) 158/100   Pulse 77   Temp 98.4  F (36.9  C) (Skin)   Resp 16   Wt 93.9 kg (207 lb)   SpO2 98%   BMI 27.12 kg/m    Body mass index is 27.12 kg/m .     General: Well appearing, NAD  Neuro: CN 2-12 intact. Fine resting tremor in hands bilaterally.  Vocal dysphonia.  Normal sensation to fine touch in all fingers.  Normal ROM and strength.  Normal neck exam.  Negative spurlings.  Psych: Anxious appearing         ASSESSMENT/PLAN:                                                        Yimi was seen today for numbness.    Diagnoses and all orders for this visit:    Essential hypertension: elevated. Recheck next week at [facility]. Cont  amlodpine.  Could consider BB given anxiety/tremor but may wait for neuro recs  -     C ART PRESS WAVEFORM ANALYS CENTRAL ART PRESSURE    Paresthesia: discussed central vs peripheral compression neuropathy that could be discogenic vs cubital tunnel vs brachial plexopathy (less likely).  Also could be related to spasmodic dysphonia or other more systemic neuro process.  Further eval by neurology recommended.  -     NEUROLOGY ADULT REFERRAL    Tremor  -     NEUROLOGY ADULT REFERRAL    Other orders  -     Cancel: TD PRESERV FREE, IM (7+ YRS)  -     Cancel: ADMIN 1st VACCINE        The following health maintenance items are reviewed in Epic and correct as of today:  Health Maintenance   Topic Date Due     HIV SCREENING  07/05/1981     ZOSTER IMMUNIZATION (1 of 2) 07/05/2016     DTAP/TDAP/TD IMMUNIZATION (2 - Td) 05/19/2018     PREVENTIVE CARE VISIT  05/15/2020     ASTHMA CONTROL TEST  07/20/2020     ASTHMA ACTION PLAN  01/20/2021     LIPID  05/15/2024     COLORECTAL CANCER SCREENING  11/09/2026     PHQ-2  Completed     INFLUENZA VACCINE  Addressed     IPV IMMUNIZATION  Aged Out     MENINGITIS IMMUNIZATION  Aged Out     HEPATITIS B IMMUNIZATION  Aged Out         Doug Subramanian MD  [facility]

## 2020-09-02 NOTE — PATIENT INSTRUCTIONS
Thank you for coming in today! If you receive a survey via My Chart, mail or phone please let us know if there was anything you especially appreciated today or if there is any way we can improve our clinic. We value your input.     Likewise, we are working hard to attend to our digital reputation.    Please consider reviewing our clinic on Google and/or Facebook via the following links:    [email or web address]                 [email or web address]    We truly appreciate you taking the time to do this.    General Information:    Today you had your appointment with Doug Subramanian MD  My hours are:    Monday 8 AM - 5 PM  Tuesday: 8 AM - 5 PM  Wednesday: 8 AM - 5 PM  Fridays: 8 AM - 5 PM    I am not in the office Thursdays. Therefore, non urgent calls received on Thursday will be addressed when I am back in the office on Friday.    If lab work was done today as part of your evaluation you will generally be contacted via My Chart, mail, or phone with the results within 1-5 days. If there is an alarming result we will contact you by phone. Lab results come back at varying times, I generally wait until all labs are resulted before making comments on results. Please note labs are automatically released to My Chart once available.    If you need refills please contact your pharmacy. They will send a refill request to me to review. Please allow 3 business days for us to process all refill requests.    Please call or send a medical message with any questions or concerns.    Thank you for choosing [facility].  We truly appreciate you trusting us with your medical care.    Sincerely,    Doug Subramanian MD

## 2020-09-03 ASSESSMENT — ASTHMA QUESTIONNAIRES: ACT_TOTALSCORE: 25

## 2020-09-10 ENCOUNTER — OFFICE VISIT (OUTPATIENT)
Dept: FAMILY MEDICINE | Facility: CLINIC | Age: 55
End: 2020-09-10

## 2020-09-10 VITALS
RESPIRATION RATE: 16 BRPM | WEIGHT: 208.25 LBS | OXYGEN SATURATION: 96 % | TEMPERATURE: 98.1 F | HEIGHT: 73 IN | SYSTOLIC BLOOD PRESSURE: 147 MMHG | DIASTOLIC BLOOD PRESSURE: 85 MMHG | HEART RATE: 76 BPM | BODY MASS INDEX: 27.6 KG/M2

## 2020-09-10 DIAGNOSIS — E78.5 HYPERLIPIDEMIA WITH TARGET LDL LESS THAN 100: ICD-10-CM

## 2020-09-10 DIAGNOSIS — Z23 NEED FOR TD VACCINE: ICD-10-CM

## 2020-09-10 DIAGNOSIS — I10 ESSENTIAL HYPERTENSION: ICD-10-CM

## 2020-09-10 DIAGNOSIS — J45.998 CHRONIC ALLERGIC BRONCHITIS: ICD-10-CM

## 2020-09-10 DIAGNOSIS — J38.3 DYSPHONIA, SPASMODIC: ICD-10-CM

## 2020-09-10 DIAGNOSIS — Z00.00 ROUTINE GENERAL MEDICAL EXAMINATION AT A HEALTH CARE FACILITY: Primary | ICD-10-CM

## 2020-09-10 DIAGNOSIS — G56.22 ULNAR NEUROPATHY OF LEFT UPPER EXTREMITY: ICD-10-CM

## 2020-09-10 PROBLEM — J98.01 BRONCHOSPASM: Status: RESOLVED | Noted: 2019-05-15 | Resolved: 2020-09-10

## 2020-09-10 PROCEDURE — 99213 OFFICE O/P EST LOW 20 MIN: CPT | Mod: 25 | Performed by: NURSE PRACTITIONER

## 2020-09-10 PROCEDURE — 93050 ART PRESSURE WAVEFORM ANALYS: CPT | Performed by: NURSE PRACTITIONER

## 2020-09-10 PROCEDURE — 99396 PREV VISIT EST AGE 40-64: CPT | Mod: 25 | Performed by: NURSE PRACTITIONER

## 2020-09-10 PROCEDURE — 36415 COLL VENOUS BLD VENIPUNCTURE: CPT | Performed by: NURSE PRACTITIONER

## 2020-09-10 PROCEDURE — 90471 IMMUNIZATION ADMIN: CPT | Mod: 59 | Performed by: NURSE PRACTITIONER

## 2020-09-10 PROCEDURE — 90714 TD VACC NO PRESV 7 YRS+ IM: CPT | Performed by: NURSE PRACTITIONER

## 2020-09-10 RX ORDER — LISINOPRIL AND HYDROCHLOROTHIAZIDE 12.5; 2 MG/1; MG/1
1 TABLET ORAL DAILY
Qty: 90 TABLET | Refills: 0 | Status: SHIPPED | OUTPATIENT
Start: 2020-09-10 | End: 2021-03-29 | Stop reason: SINTOL

## 2020-09-10 RX ORDER — AMLODIPINE BESYLATE 10 MG/1
10 TABLET ORAL DAILY
Qty: 90 TABLET | Refills: 3 | Status: SHIPPED | OUTPATIENT
Start: 2020-09-10 | End: 2020-10-12

## 2020-09-10 RX ORDER — MONTELUKAST SODIUM 10 MG/1
10 TABLET ORAL AT BEDTIME
Qty: 90 TABLET | Refills: 3 | Status: SHIPPED | OUTPATIENT
Start: 2020-09-10 | End: 2021-10-26

## 2020-09-10 ASSESSMENT — MIFFLIN-ST. JEOR: SCORE: 1838.5

## 2020-09-10 NOTE — PATIENT INSTRUCTIONS
In addition to current medications  ADD Lisinopril-hydrochlorothiazide 1 pill once daily in the morning  Let me know if you develop dry cough or other side effects  Follow-up in 1 month for blood pressure check & labs. Do not need to be fasting    Preventive Health Recommendations  Male Ages 50 - 64    Yearly exam:             See your health care provider every year in order to  o   Review health changes.   o   Discuss preventive care.    o   Review your medicines if your doctor has prescribed any.     Have a cholesterol test every 5 years, or more frequently if you are at risk for high cholesterol/heart disease.     Have a diabetes test (fasting glucose) every three years. If you are at risk for diabetes, you should have this test more often.     Have a colonoscopy at age 50, or have a yearly FIT test (stool test). These exams will check for colon cancer.      Talk with your health care provider about whether or not a prostate cancer screening test (PSA) is right for you.    You should be tested each year for STDs (sexually transmitted diseases), if you re at risk.     Shots: Get a flu shot each year. Get a tetanus shot every 10 years.     Nutrition:    Eat at least 5 servings of fruits and vegetables daily.     Eat whole-grain bread, whole-wheat pasta and brown rice instead of white grains and rice.     Get adequate Calcium and Vitamin D.     Lifestyle    Exercise for at least 150 minutes a week (30 minutes a day, 5 days a week). This will help you control your weight and prevent disease.     Limit alcohol to one drink per day.     No smoking.     Wear sunscreen to prevent skin cancer.     See your dentist every six months for an exam and cleaning.     See your eye doctor every 1 to 2 years.  Thank you for coming in today!     We are working to improve our digital reputation.  Would you please help us by reviewing our clinic on Google and/or Facebook?  These are links for filling out a review for the  clinic:    [email or web address]    We truly appreciate you taking the time to do this.     General Information:    Today you had your appointment with Phyllis Edmonds CNP  My hours are:    Monday 8 AM - 5 PM  Tuesday: 8 AM - 5 PM  Thursday: 8 AM - 5 PM  Fridays: 8 AM - 5 PM    I am not in the office Wednesdays. Therefore non urgent calls received on Wednesday will be addressed when I am back in the office on Thursday.     If lab work was done today as part of your evaluation you will generally be contacted via My Chart, mail, or phone with the results within 1-5 days. If there is an alarming result we will contact you by phone. Lab results come back at varying times, I generally wait until all labs are resulted before making comments on results. Please note labs are automatically released to My Chart once available.     If you need refills please contact your pharmacy They will send a refill request to me to review. Please allow 3 business days for us to process all refill requests.     Please call or send a medical message with any questions or concerns

## 2020-09-10 NOTE — PROGRESS NOTES
3  SUBJECTIVE:   CC: Yimi Gibbs is an 55 year old male who presents for preventive health visit.     Healthy Habits:    Do you get at least three servings of calcium containing foods daily (dairy, green leafy vegetables, etc.)? yes    Amount of exercise or daily activities, outside of work: 5 day(s) per week    Problems taking medications regularly No    Medication side effects: No    Have you had an eye exam in the past two years? yes    Do you see a dentist twice per year? no    Do you have sleep apnea, excessive snoring or daytime drowsiness?no    Chronic health conditions include: hypertension, hyperlipidemia, chronic allergic bronchitis, spasmodic dysphonia  Acute concerns include: ulnar neuropathy    Hypertension - uncontrolled. Taking Amlodipine 10 mg daily. Wife told him his left foot was a bit swollen last week. Otherwise has not noticed any swelling or other side effects. Does not exercise much other than walking dog. Eats healthy diet. Denies chest pain, pressure, palpitations, shortness of breath.   BP Readings from Last 3 Encounters:   09/10/20 (!) 147/85   09/02/20 (!) 158/100   01/20/20 (!) 154/104     Hyperlipidemia - Does not take any medication for this.  The 10-year ASCVD risk score (Rutherfordannalisa CLIFFORD Jr., et al., 2013) is: 7.8%    Values used to calculate the score:      Age: 55 years      Sex: Male      Is Non- : No      Diabetic: No      Tobacco smoker: No      Systolic Blood Pressure: 147 mmHg      Is BP treated: Yes      HDL Cholesterol: 53 mg/dL      Total Cholesterol: 214 mg/dL    Chronic allergic bronchitis - fairly well controlled with Qvar 2 times daily. Has not needed to use Albuterol inhaler. Taking Singulair. Not currently taking Claritin or Flonase.   Asthma tests reviewed:   Asthma Control Test (ACT) > or equal to 20    Spasmodic dysphonia - no changes    Ulnar neuropathy - left hand has numbness and tingling of pinkie finger and sometimes ring finger.  This has started in the past couple months but becoming more persistent. Denies trauma/injury. Denies neck pain, shoulder/elbow/wrist pain. Has been working from home during pandemic and types a lot. Referred to neurology last week but has not made appointment yet      Today's PHQ-2 Score:   PHQ-2 ( 1999 Pfizer) 9/10/2020 1/20/2020   Q1: Little interest or pleasure in doing things 0 0   Q2: Feeling down, depressed or hopeless 0 0   PHQ-2 Score 0 0       Abuse: Current or Past(Physical, Sexual or Emotional)- No  Do you feel safe in your environment? Yes        Social History     Tobacco Use     Smoking status: Never Smoker     Smokeless tobacco: Never Used   Substance Use Topics     Alcohol use: Yes     Comment: socially     If you drink alcohol do you typically have >3 drinks per day or >7 drinks per week? No                      Last PSA: No results found for: PSA    Reviewed orders with patient. Reviewed health maintenance and updated orders accordingly - Yes  Lab work is in process  BP Readings from Last 3 Encounters:   09/10/20 (!) 147/85   09/02/20 (!) 158/100   01/20/20 (!) 154/104    Wt Readings from Last 3 Encounters:   09/10/20 94.5 kg (208 lb 4 oz)   09/02/20 93.9 kg (207 lb)   01/20/20 93.8 kg (206 lb 12.8 oz)                  Patient Active Problem List   Diagnosis     Hyperlipidemia with target LDL less than 100     Dysphonia, spasmodic     Essential hypertension     Ulnar neuropathy of left upper extremity     Chronic allergic bronchitis     Past Surgical History:   Procedure Laterality Date     BUNIONECTOMY  2010     SEPTOPLASTY  2007     THYROPLASTY  2001       Social History     Tobacco Use     Smoking status: Never Smoker     Smokeless tobacco: Never Used   Substance Use Topics     Alcohol use: Yes     Comment: socially     Family History   Problem Relation Age of Onset     Prostate Cancer Mother          Reviewed and updated as needed this visit by Provider  Tobacco  Allergies  Meds  Problems   "Med Hx  Surg Hx  Fam Hx            ROS:  CONSTITUTIONAL: NEGATIVE for fever, chills, change in weight  INTEGUMENTARY/SKIN: NEGATIVE for worrisome rashes, moles or lesions  EYES: NEGATIVE for vision changes or irritation  ENT: NEGATIVE for ear, mouth and throat problems  RESP: NEGATIVE for significant cough or SOB  CV: POSITIVE for left foot swelling last week  GI: NEGATIVE for nausea, abdominal pain, heartburn, or change in bowel habits   male: negative for dysuria, hematuria, decreased urinary stream, erectile dysfunction, urethral discharge  MUSCULOSKELETAL: NEGATIVE for significant arthralgias or myalgia  NEURO: numbness or tingling left pinkie and ring finger  ENDOCRINE: NEGATIVE for temperature intolerance, skin/hair changes  HEME/ALLERGY/IMMUNE: NEGATIVE for bleeding problems  PSYCHIATRIC: NEGATIVE for changes in mood or affect    OBJECTIVE:   BP (!) 147/85   Pulse 76   Temp 98.1  F (36.7  C)   Resp 16   Ht 1.854 m (6' 1\")   Wt 94.5 kg (208 lb 4 oz)   SpO2 96%   BMI 27.48 kg/m    EXAM:  GENERAL: healthy, alert and no distress  EYES: Eyes grossly normal to inspection, PERRL and conjunctivae and sclerae normal  HENT: ear canals and TM's normal, nose and mouth without ulcers or lesions  NECK: no adenopathy, no asymmetry, masses, or scars and thyroid normal to palpation  RESP: lungs clear to auscultation - no rales, rhonchi or wheezes  CV: regular rate and rhythm, normal S1 S2, no S3 or S4, no murmur, click or rub, no peripheral edema and peripheral pulses strong  ABDOMEN: soft, nontender, no hepatosplenomegaly, no masses and bowel sounds normal  MS: no gross musculoskeletal defects noted, no edema  SKIN: no suspicious lesions or rashes  NEURO: Normal strength and tone, mentation intact and speech normal  PSYCH: mentation appears normal, affect normal/bright    ASSESSMENT/PLAN:   Yimi was seen today for physical.    Diagnoses and all orders for this visit:    Routine general medical examination at " "health care facility  -     VENOUS COLLECTION  Good health, update Td today. Declined influenza vaccine    Ulnar neuropathy of left upper extremity  -     VENOUS COLLECTION  Stretches/exercises discussed. Taking posture breaks and ensuring good ergonomics of home work space. Follow-up with neurology if no improvement or if symptoms worsening    Essential hypertension  -     Comp. Metabolic Panel (14) ([facility])  -     C ART PRESS WAVEFORM ANALYS CENTRAL ART PRESSURE  -     lisinopril-hydrochlorothiazide (ZESTORETIC) 20-12.5 MG tablet; Take 1 tablet by mouth daily  -     amLODIPine (NORVASC) 10 MG tablet; Take 1 tablet (10 mg) by mouth daily  -     VENOUS COLLECTION  Uncontrolled. Started new medication with re-check in one month    Hyperlipidemia with target LDL less than 100  -     Lipid Panel ([facility])  -     VENOUS COLLECTION  Fasting labs checked today    Chronic allergic bronchitis  -     montelukast (SINGULAIR) 10 MG tablet; Take 1 tablet (10 mg) by mouth At Bedtime  -     beclomethasone HFA (QVAR REDIHALER) 40 MCG/ACT inhaler; Inhale 1 puff into the lungs 2 times daily  -     VENOUS COLLECTION  Medications refilled. Well controlled    Dysphonia, spasmodic  No changes    Need for Td vaccine  -     TD PRESERV FREE, IM (7+ YRS)  -     VENOUS COLLECTION        COUNSELING:  Reviewed preventive health counseling, as reflected in patient instructions  Special attention given to:        Regular exercise       Healthy diet/nutrition       Immunizations    Vaccinated for: Td          Estimated body mass index is 27.48 kg/m  as calculated from the following:    Height as of this encounter: 1.854 m (6' 1\").    Weight as of this encounter: 94.5 kg (208 lb 4 oz).    Weight management plan: Discussed healthy diet and exercise guidelines    He reports that he has never smoked. He has never used smokeless tobacco.      Counseling Resources:  ATP IV Guidelines  Pooled Cohorts Equation Calculator  FRAX Risk Assessment  ICSI " Preventive Guidelines  Dietary Guidelines for Americans, 2010  USDA's MyPlate  ASA Prophylaxis  Lung CA Screening    RU Lang CNP  [facility]

## 2020-09-11 LAB
ALBUMIN SERPL-MCNC: 4.9 G/DL (ref 3.8–4.9)
ALBUMIN/GLOB SERPL: 1.8 {RATIO} (ref 1.2–2.2)
ALP SERPL-CCNC: 81 IU/L (ref 39–117)
ALT SERPL-CCNC: 143 IU/L (ref 0–44)
AST SERPL-CCNC: 104 IU/L (ref 0–40)
BILIRUB SERPL-MCNC: 1.1 MG/DL (ref 0–1.2)
BUN SERPL-MCNC: 16 MG/DL (ref 6–24)
BUN/CREATININE RATIO: 17 (ref 9–20)
CALCIUM SERPL-MCNC: 9.3 MG/DL (ref 8.7–10.2)
CHLORIDE SERPLBLD-SCNC: 99 MMOL/L (ref 96–106)
CHOLEST SERPL-MCNC: 214 MG/DL (ref 100–199)
CREAT SERPL-MCNC: 0.95 MG/DL (ref 0.76–1.27)
EGFR IF AFRICN AM: 104 ML/MIN/1.73
EGFR IF NONAFRICN AM: 90 ML/MIN/1.73
GLOBULIN, TOTAL: 2.8 G/DL (ref 1.5–4.5)
GLUCOSE SERPL-MCNC: 105 MG/DL (ref 65–99)
HDLC SERPL-MCNC: 41 MG/DL
LDL/HDL RATIO: 3.8 RATIO (ref 0–3.6)
LDLC SERPL CALC-MCNC: 154 MG/DL (ref 0–99)
POTASSIUM SERPL-SCNC: 4.6 MMOL/L (ref 3.5–5.2)
PROT SERPL-MCNC: 7.7 G/DL (ref 6–8.5)
SODIUM SERPL-SCNC: 138 MMOL/L (ref 134–144)
TOTAL CO2: 27 MMOL/L (ref 20–29)
TRIGL SERPL-MCNC: 105 MG/DL (ref 0–149)
VLDLC SERPL CALC-MCNC: 19 MG/DL (ref 5–40)

## 2020-09-16 ENCOUNTER — TELEPHONE (OUTPATIENT)
Dept: FAMILY MEDICINE | Facility: CLINIC | Age: 55
End: 2020-09-16

## 2020-09-16 DIAGNOSIS — R74.8 ELEVATED LIVER ENZYMES: Primary | ICD-10-CM

## 2020-09-16 NOTE — TELEPHONE ENCOUNTER
Left message for patient to call clinic back.    Elevated liver enzymes - recommend not taking Tylenol or drinking alcohol. I am trying to add on additional lab tests to blood already drawn, but may need him to come in if unable to add on. I also want him to schedule right upper quadrant abdominal ultrasound to evaluate liver.    Cholesterol levels elevated and risk score puts patient at high risk for cardiovascular disease complications. I recommend starting Atorvastatin 40 mg daily.    Glucose slightly elevated, which could indicate prediabetes.    Normal kidney function    RU Lang CNP on 9/16/2020 at 1:31 PM

## 2020-10-12 ENCOUNTER — OFFICE VISIT (OUTPATIENT)
Dept: FAMILY MEDICINE | Facility: CLINIC | Age: 55
End: 2020-10-12

## 2020-10-12 VITALS
SYSTOLIC BLOOD PRESSURE: 136 MMHG | WEIGHT: 209 LBS | HEART RATE: 78 BPM | TEMPERATURE: 97.6 F | DIASTOLIC BLOOD PRESSURE: 89 MMHG | OXYGEN SATURATION: 96 % | BODY MASS INDEX: 27.57 KG/M2

## 2020-10-12 DIAGNOSIS — E78.5 HYPERLIPIDEMIA WITH TARGET LDL LESS THAN 100: ICD-10-CM

## 2020-10-12 DIAGNOSIS — R79.89 ELEVATED LFTS: ICD-10-CM

## 2020-10-12 DIAGNOSIS — I10 ESSENTIAL HYPERTENSION: Primary | ICD-10-CM

## 2020-10-12 DIAGNOSIS — R73.01 IMPAIRED FASTING GLUCOSE: ICD-10-CM

## 2020-10-12 PROCEDURE — 93050 ART PRESSURE WAVEFORM ANALYS: CPT | Performed by: NURSE PRACTITIONER

## 2020-10-12 PROCEDURE — 99214 OFFICE O/P EST MOD 30 MIN: CPT | Performed by: NURSE PRACTITIONER

## 2020-10-12 PROCEDURE — 36415 COLL VENOUS BLD VENIPUNCTURE: CPT | Performed by: NURSE PRACTITIONER

## 2020-10-12 RX ORDER — ATORVASTATIN CALCIUM 40 MG/1
40 TABLET, FILM COATED ORAL DAILY
Qty: 90 TABLET | Refills: 3 | Status: CANCELLED | OUTPATIENT
Start: 2020-10-12

## 2020-10-12 NOTE — PROGRESS NOTES
Problem(s) Oriented visit        SUBJECTIVE:                                                    Yimi Gibbs is a 55 year old male who presents to clinic today for the following health issues :    Hypertension - controlled with Lisinopril-hydrochlorothiazide 20-12.5 mg daily. Amlodipine 10 mg daily also on his list but unsure if he is taking this.   Denies chest pain, pressure, palpitations, shortness of breath, ankle swelling. Has been trying to eat better and exercise.  BP Readings from Last 3 Encounters:   10/12/20 136/89   09/10/20 (!) 147/85   09/02/20 (!) 158/100     Hyperlipidemia - Elevated LDL when checked last month. Not currently on medication for this.  ASCVD 10 year risk score = 8.6%  Would like to try managing with diet/exercise.    Elevated liver enzymes - AST & ALT elevated last month. Patient thinks this is due to drinking more beer over the past several months. Negative HCV antibody testing in 2017. Does not take any Tylenol.    Impaired fasting glucose last month - denies polyuria, polydipsia.     Problem list, Medication list, Allergies, and Medical/Social/Surgical histories reviewed in EPIC and updated as appropriate.   Additional history: as documented    ROS:  5 point ROS completed and negative except noted above, including Gen, CV, Resp, GI, MS    Histories:   Patient Active Problem List   Diagnosis     Hyperlipidemia with target LDL less than 100     Dysphonia, spasmodic     Essential hypertension     Ulnar neuropathy of left upper extremity     Chronic allergic bronchitis     Past Surgical History:   Procedure Laterality Date     BUNIONECTOMY  2010     SEPTOPLASTY  2007     THYROPLASTY  2001       Social History     Tobacco Use     Smoking status: Never Smoker     Smokeless tobacco: Never Used   Substance Use Topics     Alcohol use: Yes     Comment: socially     Family History   Problem Relation Age of Onset     Prostate Cancer Mother            OBJECTIVE:                            "                         /89   Pulse 78   Temp 97.6  F (36.4  C)   Wt 94.8 kg (209 lb)   SpO2 96%   BMI 27.57 kg/m    Body mass index is 27.57 kg/m .   GENERAL: healthy, alert and no distress  EYES: Eyes grossly normal to inspection, PERRL and conjunctivae and sclerae normal  RESP: lungs clear to auscultation - no rales, rhonchi or wheezes  CV: regular rate and rhythm, normal S1 S2, no S3 or S4, no murmur, click or rub, no peripheral edema and peripheral pulses strong  MS: no gross musculoskeletal defects noted, no edema  PSYCH: mentation appears normal, affect normal/bright     ASSESSMENT/PLAN:                                                        BMI:   Estimated body mass index is 27.57 kg/m  as calculated from the following:    Height as of 9/10/20: 1.854 m (6' 1\").    Weight as of this encounter: 94.8 kg (209 lb).   Weight management plan: Discussed healthy diet and exercise guidelines      Yimi was seen today for hypertension.    Diagnoses and all orders for this visit:    Essential hypertension  -     [place] ART PRESS WAVEFORM ANALYS CENTRAL ART PRESSURE  -     Basic Metabolic Panel (8) ([facility])  Need to double check medications he is taking. Currently well controlled. Will continue to work on diet & exercise    Hyperlipidemia with target LDL less than 100  Discussed statin medication. Would like to try lifestyle modifications for 6 months and re-check    Elevated LFTs  -     Hepatic Function Panel (7) ([facility])  Re-checking today. Will order RUQ abdominal ultrasound to evaluate liver if still elevated    Impaired fasting glucose  -     Hemoglobin A1C ([facility])  Checking A1C      See Patient Instructions  Patient Instructions   Continue with just Lisinopril-hydrochlorothiazide for blood pressure.  Try to exercise more and eat healthy.  Follow-up in 6 months.     Re-checking liver enzymes today. If still elevated, I will order liver ultrasound.    --Remember to include lots of fruits, vegetables " and fiber in your diet.   --Increase your aerobic exercise, such as walking, running, biking or swimming.   --minimize butter, cheese, egg yolks, grease, fried foods, and other saturated fats   --get regular aerobic exercise  --avoid simple carbohydrates such as white pasta, white rice, white potatoes, white bread, crackers, pretzels, cookies, cake, etc.  --limit your consumption of alcohol    Thank you for coming in today!     We are working to improve our digital reputation.  Would you please help us by reviewing our clinic on Google and/or CrowdClock?  These are links for filling out a review for the clinic:    [email or web address]    We truly appreciate you taking the time to do this.     General Information:    Today you had your appointment with Phyllis Edmonds CNP  My hours are:    Monday 8 AM - 5 PM  Wednesday: 8 AM - 5 PM  Thursday: 8 AM - 5 PM  Fridays: 8 AM - 5 PM    I am not in the office Tuesdays. Therefore non urgent calls received on Tuesday will be addressed when I am back in the office on Wednesday.     If lab work was done today as part of your evaluation you will generally be contacted via My Chart, mail, or phone with the results within 1-5 days. If there is an alarming result we will contact you by phone. Lab results come back at varying times, I generally wait until all labs are resulted before making comments on results. Please note labs are automatically released to My Chart once available.     If you need refills please contact your pharmacy They will send a refill request to me to review. Please allow 3 business days for us to process all refill requests.     Please call or send a medical message with any questions or concerns        The following health maintenance items are reviewed in Epic and correct as of today:  Health Maintenance   Topic Date Due     Pneumococcal Vaccine: Pediatrics (0 to 5  Years) and At-Risk Patients (6 to 64 Years) (1 of 1 - PPSV23) 07/05/1971     ZOSTER IMMUNIZATION (1 of 2) 07/05/2016     ASTHMA ACTION PLAN  01/20/2021     ASTHMA CONTROL TEST  03/02/2021     PREVENTIVE CARE VISIT  09/10/2021     LIPID  09/10/2025     COLORECTAL CANCER SCREENING  11/09/2026     DTAP/TDAP/TD IMMUNIZATION (3 - Td) 09/10/2030     PHQ-2  Completed     INFLUENZA VACCINE  Addressed     IPV IMMUNIZATION  Aged Out     MENINGITIS IMMUNIZATION  Aged Out     HEPATITIS B IMMUNIZATION  Aged Out     HIV SCREENING  Discontinued       RU Lang CNP  [facility]  Family Practice  [facility]  [phone]    For any issues my office # is [phone]

## 2020-10-12 NOTE — PATIENT INSTRUCTIONS
Continue with just Lisinopril-hydrochlorothiazide for blood pressure.  Try to exercise more and eat healthy.  Follow-up in 6 months.     Re-checking liver enzymes today. If still elevated, I will order liver ultrasound.    --Remember to include lots of fruits, vegetables and fiber in your diet.   --Increase your aerobic exercise, such as walking, running, biking or swimming.   --minimize butter, cheese, egg yolks, grease, fried foods, and other saturated fats   --get regular aerobic exercise  --avoid simple carbohydrates such as white pasta, white rice, white potatoes, white bread, crackers, pretzels, cookies, cake, etc.  --limit your consumption of alcohol    Thank you for coming in today!     We are working to improve our digital reputation.  Would you please help us by reviewing our clinic on [employer]um[employer] and/or [employer]?  These are links for filling out a review for the clinic:    [email or web address]                 [email or web address]    We truly appreciate you taking the time to do this.     General Information:    Today you had your appointment with Phyllis Edmonds CNP  My hours are:    Monday 8 AM - 5 PM  Wednesday: 8 AM - 5 PM  Thursday: 8 AM - 5 PM  Fridays: 8 AM - 5 PM    I am not in the office Tuesdays. Therefore non urgent calls received on Tuesday will be addressed when I am back in the office on Wednesday.     If lab work was done today as part of your evaluation you will generally be contacted via My Chart, mail, or phone with the results within 1-5 days. If there is an alarming result we will contact you by phone. Lab results come back at varying times, I generally wait until all labs are resulted before making comments on results. Please note labs are automatically released to My Chart once available.     If you need refills please contact your pharmacy They will send a refill request to me to review. Please allow 3 business days for us to process  all refill requests.     Please call or send a medical message with any questions or concerns

## 2020-10-13 LAB
ALBUMIN SERPL-MCNC: 4.7 G/DL (ref 3.8–4.9)
ALP SERPL-CCNC: 103 IU/L (ref 39–117)
ALT SERPL-CCNC: 138 IU/L (ref 0–44)
AST SERPL-CCNC: 104 IU/L (ref 0–40)
BILIRUB SERPL-MCNC: 0.9 MG/DL (ref 0–1.2)
BILIRUBIN, DIRECT: 0.2 MG/DL (ref 0–0.4)
BUN SERPL-MCNC: 16 MG/DL (ref 6–24)
BUN/CREATININE RATIO: 17 (ref 9–20)
CALCIUM SERPL-MCNC: 9.4 MG/DL (ref 8.7–10.2)
CHLORIDE SERPLBLD-SCNC: 95 MMOL/L (ref 96–106)
CREAT SERPL-MCNC: 0.93 MG/DL (ref 0.76–1.27)
EGFR IF AFRICN AM: 107 ML/MIN/1.73
EGFR IF NONAFRICN AM: 93 ML/MIN/1.73
GLUCOSE SERPL-MCNC: 109 MG/DL (ref 65–99)
HBA1C MFR BLD: 5.5 % (ref 4.8–5.6)
POTASSIUM SERPL-SCNC: 5.6 MMOL/L (ref 3.5–5.2)
PROT SERPL-MCNC: 7.5 G/DL (ref 6–8.5)
SODIUM SERPL-SCNC: 131 MMOL/L (ref 134–144)
TOTAL CO2: 27 MMOL/L (ref 20–29)

## 2020-11-30 ENCOUNTER — OFFICE VISIT (OUTPATIENT)
Dept: FAMILY MEDICINE | Facility: CLINIC | Age: 55
End: 2020-11-30

## 2020-11-30 VITALS
BODY MASS INDEX: 26.65 KG/M2 | TEMPERATURE: 97.9 F | WEIGHT: 202 LBS | OXYGEN SATURATION: 98 % | SYSTOLIC BLOOD PRESSURE: 130 MMHG | HEART RATE: 87 BPM | DIASTOLIC BLOOD PRESSURE: 68 MMHG

## 2020-11-30 DIAGNOSIS — S61.012D THUMB LACERATION, LEFT, SUBSEQUENT ENCOUNTER: Primary | ICD-10-CM

## 2020-11-30 DIAGNOSIS — Z48.02 VISIT FOR SUTURE REMOVAL: ICD-10-CM

## 2020-11-30 PROCEDURE — 99213 OFFICE O/P EST LOW 20 MIN: CPT | Performed by: FAMILY MEDICINE

## 2020-11-30 NOTE — PROGRESS NOTES
SUBJECTIVE:  Yimi Gibbs is a 55 year old male here for suture removal.  Sutures placed at  urgent care 11/16 after he cut his thumb with a knife accidentally at home.  5 sutures placed.  Healing well.  Good ROM but still some slight numbness at the tip of his thumb.  No other concerns.    Social History:   Social History     Tobacco Use     Smoking status: Never Smoker     Smokeless tobacco: Never Used   Substance Use Topics     Alcohol use: Yes     Comment: socially     Drug use: No        Adverse Drug Reactions:  Patient has no known allergies.     Medications:  albuterol, beclomethasone HFA, ibuprofen, lisinopril-hydrochlorothiazide, and montelukast       OBJECTIVE:  General: NAD  Skin:  Sutures placed over the left thumb.  No sign of wound dehiscence or infection.  MSK: full ROM, normal cap refill. Slight numbness distally    Vital Signs:  /68   Pulse 87   Temp 97.9  F (36.6  C)   Wt 91.6 kg (202 lb)   SpO2 98%   BMI 26.65 kg/m        Assessment/Plan:   1. Thumb laceration, left, subsequent encounter    2. Visit for suture removal          Sutures were removed without problem.   Avoid significant sun exposure for 6 to 12 months to reduce scarring. Monitor for infection, such as fever, chills, redness, pus, discharge. Avoid heavy stress on skin that was repaired, as full strength takes several weeks to achieve. Follow up if numbness does not gradually improve.  Advised some residual numbness is possible.  Return if questions develop

## 2021-01-21 ENCOUNTER — TELEPHONE (OUTPATIENT)
Dept: FAMILY MEDICINE | Facility: CLINIC | Age: 56
End: 2021-01-21

## 2021-01-21 NOTE — CONFIDENTIAL NOTE
Have left messages for patient to call. Today called and left message on both work and mobile number for him to call back for results.

## 2021-03-15 ENCOUNTER — TELEPHONE (OUTPATIENT)
Dept: FAMILY MEDICINE | Facility: CLINIC | Age: 56
End: 2021-03-15

## 2021-03-15 NOTE — TELEPHONE ENCOUNTER
----- Message from RU Richard CNP sent at 10/13/2020 10:38 AM CDT -----  Please call patient with results.    Labs appear to possibly be hemolyzed and incorrect. I would like him to drink plenty of water and have rechecked later this week or early next week. Also needs to schedule RUQ abdominal ultrasound.    RU Lang CNP on 10/13/2020 at 10:38 AM

## 2021-03-18 ENCOUNTER — TELEPHONE (OUTPATIENT)
Dept: FAMILY MEDICINE | Facility: CLINIC | Age: 56
End: 2021-03-18

## 2021-03-18 NOTE — TELEPHONE ENCOUNTER
Had been unable to reach patient so sent him a letter to call.  Today he called back for results.  Informed him that Phyllis would like  to repeat some labs and order am  US.  Patient has appointment scheduled for April 12 follow up.  Informed patient he should come in sooner.  Appointment was changed to March 29 for follow up.

## 2021-03-29 ENCOUNTER — OFFICE VISIT (OUTPATIENT)
Dept: FAMILY MEDICINE | Facility: CLINIC | Age: 56
End: 2021-03-29

## 2021-03-29 VITALS
DIASTOLIC BLOOD PRESSURE: 88 MMHG | WEIGHT: 207 LBS | SYSTOLIC BLOOD PRESSURE: 162 MMHG | HEIGHT: 73 IN | TEMPERATURE: 98.1 F | BODY MASS INDEX: 27.43 KG/M2

## 2021-03-29 DIAGNOSIS — I10 ESSENTIAL HYPERTENSION: Primary | ICD-10-CM

## 2021-03-29 DIAGNOSIS — R79.89 ELEVATED LFTS: ICD-10-CM

## 2021-03-29 DIAGNOSIS — J45.998 CHRONIC ALLERGIC BRONCHITIS: ICD-10-CM

## 2021-03-29 DIAGNOSIS — E78.5 HYPERLIPIDEMIA WITH TARGET LDL LESS THAN 100: ICD-10-CM

## 2021-03-29 PROCEDURE — 93050 ART PRESSURE WAVEFORM ANALYS: CPT | Performed by: NURSE PRACTITIONER

## 2021-03-29 PROCEDURE — 99214 OFFICE O/P EST MOD 30 MIN: CPT | Performed by: NURSE PRACTITIONER

## 2021-03-29 RX ORDER — LOSARTAN POTASSIUM AND HYDROCHLOROTHIAZIDE 12.5; 5 MG/1; MG/1
1 TABLET ORAL DAILY
Qty: 30 TABLET | Refills: 2 | Status: SHIPPED | OUTPATIENT
Start: 2021-03-29 | End: 2021-06-21

## 2021-03-29 ASSESSMENT — ASTHMA QUESTIONNAIRES
QUESTION_4 LAST FOUR WEEKS HOW OFTEN HAVE YOU USED YOUR RESCUE INHALER OR NEBULIZER MEDICATION (SUCH AS ALBUTEROL): NOT AT ALL
QUESTION_3 LAST FOUR WEEKS HOW OFTEN DID YOUR ASTHMA SYMPTOMS (WHEEZING, COUGHING, SHORTNESS OF BREATH, CHEST TIGHTNESS OR PAIN) WAKE YOU UP AT NIGHT OR EARLIER THAN USUAL IN THE MORNING: NOT AT ALL
QUESTION_5 LAST FOUR WEEKS HOW WOULD YOU RATE YOUR ASTHMA CONTROL: WELL CONTROLLED
QUESTION_1 LAST FOUR WEEKS HOW MUCH OF THE TIME DID YOUR ASTHMA KEEP YOU FROM GETTING AS MUCH DONE AT WORK, SCHOOL OR AT HOME: NONE OF THE TIME
ACT_TOTALSCORE: 24
QUESTION_2 LAST FOUR WEEKS HOW OFTEN HAVE YOU HAD SHORTNESS OF BREATH: NOT AT ALL

## 2021-03-29 ASSESSMENT — MIFFLIN-ST. JEOR: SCORE: 1832.83

## 2021-03-29 NOTE — PATIENT INSTRUCTIONS
Get home blood pressure cuff - arm cuff (Omron is good brand)  Goal < 130/80    Start Losartan-hydrochlorothiazide 1 pill once daily in the morning.  Call or message if any side effects    Follow-up in clinic in one month

## 2021-03-29 NOTE — PROGRESS NOTES
"Problem(s) Oriented visit        SUBJECTIVE:                                                    Ymii Gibbs is a 55 year old male who presents to clinic today for the following health issues :    Hypertension - elevated today. Hasn't taken BP meds for about 10 days. Thinks that his medication was causing him to have dry cough but brought Amlodipine with him today saying that was the one he was recently taking. Supposed to be taking Lisinopril-hydrochlorothiazide. Does have have home blood pressure monitor. Denies chest pain, pressure, palpitations.  BP Readings from Last 3 Encounters:   03/29/21 (!) 162/88   11/30/20 130/68   10/12/20 136/89     Hyperlipidemia - not on statin    Elevated LFT's - recommended right upper quadrant ultrasound but declines due to cost. States he is fine and not having symptoms.    Allergies, asthma - Taking Montelukast. Not using Qvar inhaler and rarely uses Albuterol inhaler.  ACT Total Scores 1/20/2020 9/2/2020 3/29/2021   ACT TOTAL SCORE (Goal Greater than or Equal to 20) 13 25 24   In the past 12 months, how many times did you visit the emergency room for your asthma without being admitted to the hospital? 0 0 0   In the past 12 months, how many times were you hospitalized overnight because of your asthma? 0 0 0         Problem list, Medication list, Allergies, and Medical/Social/Surgical histories reviewed in EPIC and updated as appropriate.   Additional history: as documented    ROS:  5 point ROS completed and negative except noted above, including Gen, CV, Resp, GI, MS    OBJECTIVE:                                                    BP (!) 162/88   Temp 98.1  F (36.7  C)   Ht 1.854 m (6' 1\")   Wt 93.9 kg (207 lb)   BMI 27.31 kg/m    Body mass index is 27.31 kg/m .   GENERAL: healthy, alert and no distress  RESP: lungs clear to auscultation - no rales, rhonchi or wheezes  CV: regular rates and rhythm, normal S1 S2, no S3 or S4 and no murmur, click or rub  PSYCH: " normal affect & mood     ASSESSMENT/PLAN:                                                        Yimi was seen today for hypertension and recheck medication.    Diagnoses and all orders for this visit:    Essential hypertension  -     [place] ART PRESS WAVEFORM ANALYS CENTRAL ART PRESSURE  -     losartan-hydrochlorothiazide (HYZAAR) 50-12.5 MG tablet; Take 1 tablet by mouth daily  Uncontrolled. Unsure what medication is causing his cough if it is side effect. New medication - Losartan-hydrochlorothiazide 50-12.5 mg daily. Do not take Lisinopril-hydrochlorothiazide or Amlodipine. Follow-up in 1 month.     Hyperlipidemia with target LDL less than 100  Elevated total & LDL cholesterol levels 9/2020. Not on statin, declines    Elevated LFTs  Declines RUQ abdominal US for elevated liver enzymes. States he is fine and doesn't want to do this due to cost    Chronic allergic bronchitis  Continue Montelukast. Use of Qvar & Albuterol discussed      See Patient Instructions  Patient Instructions   Get home blood pressure cuff - arm cuff (Omron is good brand)  Goal < 130/80    Start Losartan-hydrochlorothiazide 1 pill once daily in the morning.  Call or message if any side effects    Follow-up in clinic in one month      RU Lang CNP  [facility]  Family Practice  [facility]  [phone]    For any issues my office # is [phone]

## 2021-03-30 ASSESSMENT — ASTHMA QUESTIONNAIRES: ACT_TOTALSCORE: 24

## 2021-06-21 DIAGNOSIS — I10 ESSENTIAL HYPERTENSION: ICD-10-CM

## 2021-06-21 RX ORDER — LOSARTAN POTASSIUM AND HYDROCHLOROTHIAZIDE 12.5; 5 MG/1; MG/1
1 TABLET ORAL DAILY
Qty: 30 TABLET | Refills: 2 | Status: SHIPPED | OUTPATIENT
Start: 2021-06-21 | End: 2021-09-22

## 2021-06-21 NOTE — TELEPHONE ENCOUNTER
Lisin/HCTZ. Last addressed 3/29/21. Was to F/U in 1 mo. Due for med check.         Essential hypertension  -     [place] ART PRESS WAVEFORM ANALYS CENTRAL ART PRESSURE  -     losartan-hydrochlorothiazide (HYZAAR) 50-12.5 MG tablet; Take 1 tablet by mouth daily  Uncontrolled. Unsure what medication is causing his cough if it is side effect. New medication - Losartan-hydrochlorothiazide 50-12.5 mg daily. Do not take Lisinopril-hydrochlorothiazide or Amlodipine. Follow-up in 1 month.

## 2021-06-25 ENCOUNTER — OFFICE VISIT (OUTPATIENT)
Dept: FAMILY MEDICINE | Facility: CLINIC | Age: 56
End: 2021-06-25

## 2021-06-25 VITALS
OXYGEN SATURATION: 98 % | SYSTOLIC BLOOD PRESSURE: 162 MMHG | WEIGHT: 205 LBS | BODY MASS INDEX: 27.05 KG/M2 | DIASTOLIC BLOOD PRESSURE: 96 MMHG | HEART RATE: 68 BPM

## 2021-06-25 DIAGNOSIS — L63.9 ALOPECIA AREATA: Primary | ICD-10-CM

## 2021-06-25 PROCEDURE — 99214 OFFICE O/P EST MOD 30 MIN: CPT | Performed by: FAMILY MEDICINE

## 2021-06-25 PROCEDURE — 36415 COLL VENOUS BLD VENIPUNCTURE: CPT | Performed by: FAMILY MEDICINE

## 2021-06-25 NOTE — PATIENT INSTRUCTIONS
Patient Education     Understanding Alopecia Areata     Alopecia areata is a condition that causes your hair to fall out. It causes bald patches on the scalp, but it can also cause hair loss on other parts of the body.    How to say it  [id number]  What causes alopecia areata?  Alopecia areata is an autoimmune disease. This means it s caused by the body s immune system attacking its own tissues. The immune system attacks the hair follicles. It causes hair to stop growing, and then break off and fall out. Genetic makeup and other factors may trigger this condition.   If you have been diagnosed with alopecia areata you may have a higher risk for:     Another autoimmune disease such as thyroid disease or vitiligo    Asthma and allergies such as eczema (atopic dermatitis) and hay fever  Symptoms of alopecia areata  The main symptom is one or more bald patches on the scalp that occur over a few weeks as hair falls out. Bald patches most often occur on the scalp, but hair can also fall out on the face and other parts of the body. The skin may itch or burn before the hair falls out. Then the skin may be smooth, or have some short hairs left. In some people, the rest of the hair on the head and the entire body may also thin or fall out.   Some people also have small dents or pits in their fingernails or toenails Other symptoms may include roughness, cracks, white spots or lines or loss of shine on the nails.   Treatment for alopecia areata  You can choose not to have any treatment. For many people, the hair will grow back in time. In some cases, it may fall out again. Treatment doesn t prevent hair from falling out in the future.   Some medicines can help regrow hair faster, or stop hair from falling out. These medicines include:     Corticosteroid medicine.  This is injected into the areas where hair is falling out or gone. The injections are done every 4 to 12 weeks. Corticosteroid medicine can also be  used as an ointment or cream put on the skin. These are often used along with the injections.    Immunotherapy medicine.  This is a type of medicine that causes an allergic reaction on the skin. You apply it once a week as a lotion onto the skin of the scalp.    Topical minoxidil.  You put this over-the-counter medicine right on the scalp. It may help new hair grow.    Other medicines.  Many other medicines can be used, but they may suppress the immune system. They can have unwanted side effects.  Medicines used for treatment have side effects. They also work better on some people than others. It depends on how severe your hair loss is. Talk with your healthcare provider about what medicines are the best options for you.   Living with alopecia areata  For many people, the hair grows back within a year. But your hair may fall out again the future. This process may occur a few times over several years. Or the hair may not fully grow back. In some people, all the scalp hair or body hair may fall out.   Hair loss is more likely to happen again if you have any of these:    Hair loss for more than 1 year    Nail symptoms    A family history of alopecia areata    Hair loss that started in childhood    Loss of a lot of hair    Loss of hair in a band from the ears around the back of the head  Hair loss can cause stress and other emotional upset. Talk with your healthcare provider about finding support groups in your area to help you manage your condition. You can also talk to him or her about cosmetic fixes. A wig can be used to conceal bald patches. Tattooing of the eyebrows can restore the look of eyebrow hairs. Your healthcare provider may have resources to help.   When to call your healthcare provider  Call your healthcare provider right away if you have any of these:    Symptoms that don t get better, or get worse    New symptoms  [employer] last reviewed this educational content on 6/1/2019 2000-2021 [employer]. All rights reserved. This information is not intended as a substitute for professional medical care. Always follow your healthcare professional's instructions.

## 2021-06-26 LAB — TSH BLD-ACNC: 2.33 UIU/ML (ref 0.45–4.5)

## 2021-07-12 ENCOUNTER — OFFICE VISIT (OUTPATIENT)
Dept: FAMILY MEDICINE | Facility: CLINIC | Age: 56
End: 2021-07-12

## 2021-07-12 VITALS
OXYGEN SATURATION: 99 % | SYSTOLIC BLOOD PRESSURE: 150 MMHG | HEART RATE: 59 BPM | WEIGHT: 207.4 LBS | HEIGHT: 73 IN | DIASTOLIC BLOOD PRESSURE: 86 MMHG | RESPIRATION RATE: 16 BRPM | BODY MASS INDEX: 27.49 KG/M2

## 2021-07-12 DIAGNOSIS — L63.9 ALOPECIA AREATA: Primary | ICD-10-CM

## 2021-07-12 DIAGNOSIS — J45.998 CHRONIC ALLERGIC BRONCHITIS: ICD-10-CM

## 2021-07-12 DIAGNOSIS — I10 ESSENTIAL HYPERTENSION: ICD-10-CM

## 2021-07-12 PROCEDURE — 11901 INJECT SKIN LESIONS >7: CPT | Performed by: FAMILY MEDICINE

## 2021-07-12 PROCEDURE — 93050 ART PRESSURE WAVEFORM ANALYS: CPT | Performed by: FAMILY MEDICINE

## 2021-07-12 PROCEDURE — 99212 OFFICE O/P EST SF 10 MIN: CPT | Mod: 25 | Performed by: FAMILY MEDICINE

## 2021-07-12 RX ORDER — TRIAMCINOLONE ACETONIDE 40 MG/ML
10 INJECTION, SUSPENSION INTRA-ARTICULAR; INTRAMUSCULAR ONCE
Status: COMPLETED | OUTPATIENT
Start: 2021-07-12 | End: 2021-07-12

## 2021-07-12 RX ADMIN — TRIAMCINOLONE ACETONIDE 12 MG: 40 INJECTION, SUSPENSION INTRA-ARTICULAR; INTRAMUSCULAR at 11:50

## 2021-07-12 ASSESSMENT — MIFFLIN-ST. JEOR: SCORE: 1829.64

## 2021-07-12 NOTE — PATIENT INSTRUCTIONS
Patient Education     My Asthma Action Plan  Instructions: Use this Asthma Action Plan to make sure you know what to do if you develop asthma symptoms. Make sure your plan is always up-to-date.  1. Make a few copies of this blank action plan.  2. Save the original one and take a copy to your next office visit.  3. Ask your healthcare provider to help you complete it.  4. Take a new blank copy and the completed plan to each visit.  5. If there are any changes, ask your healthcare provider to help you complete the new copy. If there are no changes, continue using the completed plan.  Your name:  _________________________  Emergency contact:  _________________________  Healthcare provider:  _________________________ Today's date:  _________________________  Phone:  _________________________  Signature:  _________________________ Next appt (date/time):  _________________________  Phone:  _________________________  Phone:  _________________________      Green zone (GO zone)   My symptoms What I should do My medicines     No wheezing, coughing, or chest tightness    Asthma is not bothering your sleep, work, or school    You rarely or never use your quick-relief medicine  Peak flow is:     _____________________  80%-100% of personal best   Keep taking long-term controller medicines, preventive medicines, or both    Call your healthcare provider if your medicines are not controlling your asthma  Stay away from your asthma triggers (list):  __________________________     __________________________     __________________________     __________________________     __________________________        Special instructions (before exercise, field trips, or outdoor activities):     ___________________________     ___________________________ Name:  __albuterol and singulair as needed________________________  Dose and how taken:  __________________________  How often and  when:  __________________________  Name:  __________________________  Dose and how taken:  __________________________  How often and when:  __________________________      Yellow zone (CAUTION zone)   My symptoms What I should do My medicines     Mild wheezing, coughing during day and night, or chest tightness    When at rest, your breathing is a little faster than normal    Asthma symptoms wake you up at night  Peak flow is:     ___________________________  50%-80% of personal best, or   has lessened by at least 15%     You begin to have symptoms of a respiratory infection or a cold, if infections trigger your symptoms   Keep taking  long-term controller medicines, preventive medicines, or both    Use your quick-relief medicine    Follow your healthcare provider's instructions if you don't feel better within an hour after using your quick-relief medicine    Call your healthcare provider right away if you are unsure of what to do Long-term controllers:     Name:  _________________________  Dose and how taken:  _________________________  How often and when:  _________________________  Special instructions:  _________________________     Name:  _________________________  Dose and how taken:  _________________________  How often and when:  _________________________  Special instructions:  _________________________     Quick-relief medicine:     Name:  _________________________  Dose and how taken:  _________________________  How often and when:  _________________________     If your symptoms don't go away after 1 hour, take:  _________________________  Dose and how taken:  _________________________  How often and when:  _________________________      Red zone (DANGER zone)   My symptoms What I should do My medicines     Continuous wheezing, coughing, or trouble breathing    Trouble walking or talking    Asthma symptoms make it hard for you to sleep     Peak flow is:     __________________________  Less than 50% of  personal best   Use your quick-relief medicines    Call your healthcare provider right away if these medicines don't help you breathe better     Call 911 if:    It's hard to breathe, walk, or talk    Your lips or fingers look pale, gray, or blue    You feel confused, lightheaded, or dizzy    You have tightness in your throat or chest Quick-relief medicine:  __________________________  Dose and how taken:  __________________________  How often and when:  __________________________     Quick-relief medicine:  __________________________  Dose and how taken:  __________________________  How often and when:  __________________________     Quick-relief medicine:  __________________________  Dose and how taken:  __________________________  How often and when:  __________________________        1913-8028 [employer]. All rights reserved. This information is not intended as a substitute for professional medical care. Always follow your healthcare professional's instructions.         Continue the minoxidil  Follow up for repeat injections 4-6 weeks

## 2021-07-12 NOTE — PROGRESS NOTES
"  Michelle Geller is a 56 year old patient who presents to clinic for follow up.  Seen last visit and diagnosed clinically with alopecia areata.  He was given a dermatology referral to discuss corticosteroid injections and other treatment options but was unable to get an appointment until the end of July.  He is back today requesting initiation of injections and if any other treatments could help.  He feels things have worsened.  He is using 5% Minoxidil.  No other concerns.      Review of Systems   Constitutional, HEENT, cardiovascular, pulmonary, gi and gu systems are negative, except as otherwise noted.      Objective    BP (!) 150/86 (BP Location: Left arm, Patient Position: Sitting, Cuff Size: Adult Regular)   Pulse 59   Resp 16   Ht 1.854 m (6' 1\")   Wt 94.1 kg (207 lb 6.4 oz)   SpO2 99%   BMI 27.36 kg/m      General: Well appearing, NAD  Skin: there is diffuse thinning of the hair and receding hairline.  Within this background, there are several well circumscribed oval areas of patchy hair loss.  The skin is normal and non-scarring and numerous velus hairs are visualized.   Psych: normal mood and affect      The potential risks of the procedure were discussed and understood, including the potential for skin atrophy, telangiectasia, and hypopigmentation.  Verbal consent was obtained.  All questions were answered.  Triamcinolone was diluted in saline to 10 mg/mL concentration.  Using a 30 gauge 1/2 inch needle, 0.1 mL was injected into the affected areas of the dermis at 1 cm intervals with no more than 0.1 mL per site.  A total of 1 mL was injected this session.  The patient tolerated the procedure well.  Anticipatory guidance was given and understood.       Assessment & Plan     Alopecia areata  Injections as above.  He will follow up in 4-6 weeks for repeat or can transition to dermatology if preferred.    Essential hypertension  Above goal, cont to monitor and reassess.  Cont current " medications  - [place] ART PRESS WAVEFORM ANALYS CENTRAL ART PRESSURE    Chronic allergic bronchitis  - Asthma Action Plan (AAP)        See Patient Instructions    Return in about 4 weeks (around 8/9/2021) for Follow Up.    Doug Subramanian MD  [facility]

## 2021-07-13 ASSESSMENT — ASTHMA QUESTIONNAIRES: ACT_TOTALSCORE: 24

## 2021-08-16 ENCOUNTER — OFFICE VISIT (OUTPATIENT)
Dept: FAMILY MEDICINE | Facility: CLINIC | Age: 56
End: 2021-08-16

## 2021-08-16 VITALS
DIASTOLIC BLOOD PRESSURE: 81 MMHG | SYSTOLIC BLOOD PRESSURE: 130 MMHG | OXYGEN SATURATION: 97 % | TEMPERATURE: 97.8 F | BODY MASS INDEX: 27.2 KG/M2 | HEART RATE: 77 BPM | WEIGHT: 206.2 LBS

## 2021-08-16 DIAGNOSIS — I10 ESSENTIAL HYPERTENSION: ICD-10-CM

## 2021-08-16 DIAGNOSIS — L63.9 ALOPECIA AREATA: Primary | ICD-10-CM

## 2021-08-16 PROCEDURE — 11901 INJECT SKIN LESIONS >7: CPT | Performed by: FAMILY MEDICINE

## 2021-08-16 RX ORDER — TRIAMCINOLONE ACETONIDE 40 MG/ML
20 INJECTION, SUSPENSION INTRA-ARTICULAR; INTRAMUSCULAR ONCE
Status: COMPLETED | OUTPATIENT
Start: 2021-08-16 | End: 2021-08-16

## 2021-08-16 RX ADMIN — TRIAMCINOLONE ACETONIDE 20 MG: 40 INJECTION, SUSPENSION INTRA-ARTICULAR; INTRAMUSCULAR at 10:59

## 2021-08-16 NOTE — PROGRESS NOTES
Ruben returns for repeat intralesional corticosteroid injections for alopecia areata.  First time performed 7/12.  No definite improvement and possible slight worsening.  No side effects noted from previous injections.      PROCEDURE:    The potential risks of the procedure were discussed and understood, including the potential for skin atrophy, telangiectasia, and hypopigmentation.  Verbal consent was obtained.  All questions were answered.  Triamcinolone was diluted in saline to 10 mg/mL concentration.  Using a 30 gauge 1/2 inch needle, 0.1 mL was injected into the affected areas of the dermis at 1 cm intervals with no more than 0.1 mL per site.  A total of 2.2 mL was injected this session.  The patient tolerated the procedure well.  Anticipatory guidance was given and understood.     Yimi was seen today for imm/inj and hypertension.    Diagnoses and all orders for this visit:    Alopecia areata: repeat as above, return 4-6 weeks for repeat.  If not improving over next couple sessions will re-encourage derm evaluation.    Essential hypertension: at goal

## 2021-09-14 ENCOUNTER — OFFICE VISIT (OUTPATIENT)
Dept: FAMILY MEDICINE | Facility: CLINIC | Age: 56
End: 2021-09-14

## 2021-09-14 VITALS
DIASTOLIC BLOOD PRESSURE: 87 MMHG | SYSTOLIC BLOOD PRESSURE: 150 MMHG | BODY MASS INDEX: 27.31 KG/M2 | OXYGEN SATURATION: 98 % | WEIGHT: 207 LBS | HEART RATE: 76 BPM | TEMPERATURE: 97.5 F

## 2021-09-14 DIAGNOSIS — I10 ESSENTIAL HYPERTENSION: ICD-10-CM

## 2021-09-14 DIAGNOSIS — L63.9 ALOPECIA AREATA: Primary | ICD-10-CM

## 2021-09-14 PROCEDURE — 99213 OFFICE O/P EST LOW 20 MIN: CPT | Mod: 25 | Performed by: FAMILY MEDICINE

## 2021-09-14 PROCEDURE — 11901 INJECT SKIN LESIONS >7: CPT | Performed by: FAMILY MEDICINE

## 2021-09-14 PROCEDURE — 93050 ART PRESSURE WAVEFORM ANALYS: CPT | Performed by: FAMILY MEDICINE

## 2021-09-14 RX ORDER — TRIAMCINOLONE ACETONIDE 40 MG/ML
20 INJECTION, SUSPENSION INTRA-ARTICULAR; INTRAMUSCULAR ONCE
Status: COMPLETED | OUTPATIENT
Start: 2021-09-14 | End: 2021-09-14

## 2021-09-14 RX ADMIN — TRIAMCINOLONE ACETONIDE 20 MG: 40 INJECTION, SUSPENSION INTRA-ARTICULAR; INTRAMUSCULAR at 13:12

## 2021-09-22 DIAGNOSIS — I10 ESSENTIAL HYPERTENSION: ICD-10-CM

## 2021-09-22 NOTE — TELEPHONE ENCOUNTER
Losartan/HCTZ. Last addressed 9/14/21.     BP Readings from Last 3 Encounters:   09/14/21 (!) 150/87   08/16/21 130/81   07/12/21 (!) 150/86            Essential hypertension  Above goal.  Will monitor at home and recheck at follow up.  - [place] ART PRESS WAVEFORM ANALYS CENTRAL ART PRESSURE        Return in about 6 weeks (around 10/26/2021) for Follow Up.

## 2021-09-23 RX ORDER — LOSARTAN POTASSIUM AND HYDROCHLOROTHIAZIDE 12.5; 5 MG/1; MG/1
1 TABLET ORAL DAILY
Qty: 90 TABLET | Refills: 0 | Status: SHIPPED | OUTPATIENT
Start: 2021-09-23 | End: 2021-12-23

## 2021-10-23 ENCOUNTER — HEALTH MAINTENANCE LETTER (OUTPATIENT)
Age: 56
End: 2021-10-23

## 2021-10-25 DIAGNOSIS — J45.998 CHRONIC ALLERGIC BRONCHITIS: ICD-10-CM

## 2021-10-26 RX ORDER — MONTELUKAST SODIUM 10 MG/1
10 TABLET ORAL AT BEDTIME
Qty: 90 TABLET | Refills: 3 | Status: SHIPPED | OUTPATIENT
Start: 2021-10-26 | End: 2022-10-25

## 2021-11-08 ENCOUNTER — OFFICE VISIT (OUTPATIENT)
Dept: FAMILY MEDICINE | Facility: CLINIC | Age: 56
End: 2021-11-08

## 2021-11-08 VITALS
DIASTOLIC BLOOD PRESSURE: 92 MMHG | SYSTOLIC BLOOD PRESSURE: 132 MMHG | HEIGHT: 73 IN | BODY MASS INDEX: 26.88 KG/M2 | RESPIRATION RATE: 18 BRPM | WEIGHT: 202.8 LBS | HEART RATE: 83 BPM | OXYGEN SATURATION: 98 %

## 2021-11-08 DIAGNOSIS — I10 ESSENTIAL HYPERTENSION: ICD-10-CM

## 2021-11-08 DIAGNOSIS — L63.9 ALOPECIA AREATA: Primary | ICD-10-CM

## 2021-11-08 DIAGNOSIS — Z23 NEED FOR PNEUMOCOCCAL VACCINATION: ICD-10-CM

## 2021-11-08 PROCEDURE — 90732 PPSV23 VACC 2 YRS+ SUBQ/IM: CPT | Performed by: FAMILY MEDICINE

## 2021-11-08 PROCEDURE — 90471 IMMUNIZATION ADMIN: CPT | Mod: 59 | Performed by: FAMILY MEDICINE

## 2021-11-08 PROCEDURE — 99213 OFFICE O/P EST LOW 20 MIN: CPT | Mod: 25 | Performed by: FAMILY MEDICINE

## 2021-11-08 PROCEDURE — 93050 ART PRESSURE WAVEFORM ANALYS: CPT | Performed by: FAMILY MEDICINE

## 2021-11-08 PROCEDURE — 11901 INJECT SKIN LESIONS >7: CPT | Mod: 59 | Performed by: FAMILY MEDICINE

## 2021-11-08 RX ORDER — TRIAMCINOLONE ACETONIDE 40 MG/ML
20 INJECTION, SUSPENSION INTRA-ARTICULAR; INTRAMUSCULAR ONCE
Status: COMPLETED | OUTPATIENT
Start: 2021-11-08 | End: 2021-11-08

## 2021-11-08 RX ADMIN — TRIAMCINOLONE ACETONIDE 20 MG: 40 INJECTION, SUSPENSION INTRA-ARTICULAR; INTRAMUSCULAR at 12:32

## 2021-11-08 ASSESSMENT — MIFFLIN-ST. JEOR: SCORE: 1808.77

## 2021-11-08 NOTE — PROGRESS NOTES
"  Subjective     Yimi is a 56 year old patient who presents to clinic for follow up.    Ruben returns for repeat intralesional corticosteroid injections for alopecia areata.  Previously performed 7/12, 8/16, and 9/14.  He has been vacationing in [place] and recently returned.       He feels there has been improvement since last visit.     HTN: not checking readings at home still.  No CP, SOB, headache.    Review of Systems   Constitutional, HEENT, cardiovascular, pulmonary, GI, , musculoskeletal, neuro, skin, endocrine and psych systems are negative, except as otherwise noted.      Objective    BP (!) 132/92   Pulse 83   Resp 18   Ht 1.854 m (6' 1\")   Wt 92 kg (202 lb 12.8 oz)   SpO2 98%   BMI 26.76 kg/m      General: Well appearing, NAD  Skin: there is diffuse thinning of the hair and receding hairline.  Within this background, there are areas of patchy hair loss.  The skin is normal and non-scarring and numerous velus hairs are visualized.   Psych: normal mood and affect              PROCEDURE:     The potential risks of the procedure were discussed and understood, including the potential for skin atrophy, telangiectasia, and hypopigmentation.  Verbal consent was obtained.  All questions were answered.  Triamcinolone was diluted in saline to 10 mg/mL concentration.  Using a 30 gauge 1/2 inch needle, 0.1 mL was injected into the affected areas of the dermis at 1 cm intervals with no more than 0.1 mL per site.  A total of 2 mL was injected this session.  The patient tolerated the procedure well.  Anticipatory guidance was given and understood.     Assessment & Plan     Alopecia areata  Interval improvement.  Repeat injections as above.  Follow up 1 month.  Cont minoxidil.  Consider derm referral if any regression, he declines for now    Need for pneumococcal vaccination  - ADMIN PNEUMOCOCCAL VACCINE    Essential hypertension  Above goal, monitor at home and recheck with home readings in 1 month  - [place] ART " PRESS WAVEFORM ANALYS CENTRAL ART PRESSURE      See Patient Instructions    Return in about 4 weeks (around 12/6/2021) for Follow Up.    Doug Subramanian MD  [facility]

## 2021-12-13 ENCOUNTER — OFFICE VISIT (OUTPATIENT)
Dept: FAMILY MEDICINE | Facility: CLINIC | Age: 56
End: 2021-12-13

## 2021-12-13 VITALS
WEIGHT: 201 LBS | HEIGHT: 73 IN | RESPIRATION RATE: 16 BRPM | OXYGEN SATURATION: 96 % | SYSTOLIC BLOOD PRESSURE: 126 MMHG | HEART RATE: 95 BPM | BODY MASS INDEX: 26.64 KG/M2 | DIASTOLIC BLOOD PRESSURE: 84 MMHG

## 2021-12-13 DIAGNOSIS — L60.8 TOENAIL DEFORMITY: ICD-10-CM

## 2021-12-13 DIAGNOSIS — L63.9 ALOPECIA AREATA: Primary | ICD-10-CM

## 2021-12-13 PROCEDURE — 99213 OFFICE O/P EST LOW 20 MIN: CPT | Performed by: FAMILY MEDICINE

## 2021-12-13 ASSESSMENT — MIFFLIN-ST. JEOR: SCORE: 1800.61

## 2021-12-13 NOTE — PROGRESS NOTES
"  Subjective     Yimi is a 56 year old patient who presents to clinic for follow up.    Ruben returns for repeat intralesional corticosteroid injections for alopecia areata.  Previously performed 7/12, 8/16, 9/14, and 11/8.  There has been gradual improvement.     Also has chronic toenail deformity and would like podiatry referral        Review of Systems   Constitutional, HEENT, cardiovascular, pulmonary, gi and gu systems are negative, except as otherwise noted.      Objective    /84   Pulse 95   Resp 16   Ht 1.854 m (6' 1\")   Wt 91.2 kg (201 lb)   SpO2 96%   BMI 26.52 kg/m      General: Well appearing, NAD  Skin: patchy hair loss but there is evidence of new hair growth.  No scarring  Psych: normal mood and affect                  Alopecia areata  Improving.  He elects to hold off on further injections and this visit and will reassess next visit.  Follow up sooner if worsening.      Toenail deformity  - Orthopedic  Referral; Future    Follow up in 4 weeks          "

## 2021-12-23 DIAGNOSIS — I10 ESSENTIAL HYPERTENSION: ICD-10-CM

## 2021-12-23 RX ORDER — LOSARTAN POTASSIUM AND HYDROCHLOROTHIAZIDE 12.5; 5 MG/1; MG/1
TABLET ORAL
Qty: 90 TABLET | Refills: 0 | Status: SHIPPED | OUTPATIENT
Start: 2021-12-23 | End: 2022-04-12

## 2021-12-23 NOTE — TELEPHONE ENCOUNTER
Losartan Potassium-HCTZ   LOV (SCALP) 12/13/21  LOV (BP) 1/8/21  LAST LABS 10/12/2020    UPCOMING F/U SCHEDULED FOR 1/17/22  ALSO DUE FOR FASTING LABS & LAB RECHECK    BP Readings from Last 3 Encounters:   12/13/21 126/84   11/08/21 (!) 132/92   09/14/21 (!) 150/87     Last Comprehensive Metabolic Panel:  Sodium   Date Value Ref Range Status   10/12/2020 131 (L) 134 - 144 mmol/L Final     Potassium   Date Value Ref Range Status   10/12/2020 5.6 (H) 3.5 - 5.2 mmol/L Final     Chloride   Date Value Ref Range Status   10/12/2020 95 (L) 96 - 106 mmol/L Final     Carbon Dioxide   Date Value Ref Range Status   02/07/2013 26 20 - 32 mmol/L Final     Anion Gap   Date Value Ref Range Status   02/07/2013 14 6 - 17 mmol/L Final     Glucose   Date Value Ref Range Status   10/12/2020 109 (H) 65 - 99 mg/dL Final     Urea Nitrogen   Date Value Ref Range Status   10/12/2020 16 6 - 24 mg/dL Final     BUN/Creatinine Ratio   Date Value Ref Range Status   10/12/2020 17 9 - 20 Final     Creatinine   Date Value Ref Range Status   10/12/2020 0.93 0.76 - 1.27 mg/dL Final     GFR Estimate   Date Value Ref Range Status   02/07/2013 >90 >60 mL/min/1.7m2 Final     Calcium   Date Value Ref Range Status   10/12/2020 9.4 8.7 - 10.2 mg/dL Final

## 2022-01-21 DIAGNOSIS — J45.998 CHRONIC ALLERGIC BRONCHITIS: ICD-10-CM

## 2022-01-21 NOTE — TELEPHONE ENCOUNTER
Q Jeffry Redinhaler 40 mcg    Last OV 12/13/21- not related  Last labs 6/25/21    Last ACT 7/12/21 = 24    Patient stated in Epic uses every other day not BID

## 2022-01-31 DIAGNOSIS — J45.998 CHRONIC ALLERGIC BRONCHITIS: ICD-10-CM

## 2022-02-28 ENCOUNTER — HOSPITAL ENCOUNTER (EMERGENCY)
Facility: CLINIC | Age: 57
Discharge: HOME | End: 2022-02-28
Attending: EMERGENCY MEDICINE | Admitting: EMERGENCY MEDICINE
Payer: COMMERCIAL

## 2022-02-28 VITALS
HEART RATE: 67 BPM | WEIGHT: 200 LBS | DIASTOLIC BLOOD PRESSURE: 81 MMHG | RESPIRATION RATE: 10 BRPM | BODY MASS INDEX: 25.67 KG/M2 | HEIGHT: 74 IN | SYSTOLIC BLOOD PRESSURE: 128 MMHG | OXYGEN SATURATION: 92 % | TEMPERATURE: 97.5 F

## 2022-02-28 DIAGNOSIS — T78.3XXA ANGIOEDEMA, INITIAL ENCOUNTER: ICD-10-CM

## 2022-02-28 DIAGNOSIS — T78.3XXA ANGIOTENSIN CONVERTING ENZYME INHIBITOR-AGGRAVATED ANGIOEDEMA, INITIAL ENCOUNTER: ICD-10-CM

## 2022-02-28 DIAGNOSIS — T46.4X5A ANGIOTENSIN CONVERTING ENZYME INHIBITOR-AGGRAVATED ANGIOEDEMA, INITIAL ENCOUNTER: ICD-10-CM

## 2022-02-28 PROCEDURE — 96375 TX/PRO/DX INJ NEW DRUG ADDON: CPT

## 2022-02-28 PROCEDURE — 99284 EMERGENCY DEPT VISIT MOD MDM: CPT | Mod: 25

## 2022-02-28 PROCEDURE — 250N000011 HC RX IP 250 OP 636: Performed by: EMERGENCY MEDICINE

## 2022-02-28 PROCEDURE — 250N000009 HC RX 250: Performed by: EMERGENCY MEDICINE

## 2022-02-28 PROCEDURE — 96374 THER/PROPH/DIAG INJ IV PUSH: CPT

## 2022-02-28 RX ORDER — DEXAMETHASONE SODIUM PHOSPHATE 4 MG/ML
10 INJECTION, SOLUTION INTRA-ARTICULAR; INTRALESIONAL; INTRAMUSCULAR; INTRAVENOUS; SOFT TISSUE ONCE
Status: COMPLETED | OUTPATIENT
Start: 2022-02-28 | End: 2022-02-28

## 2022-02-28 RX ADMIN — DEXAMETHASONE SODIUM PHOSPHATE 10 MG: 4 INJECTION, SOLUTION INTRAMUSCULAR; INTRAVENOUS at 17:46

## 2022-02-28 RX ADMIN — TRANEXAMIC ACID 1 G: 100 INJECTION, SOLUTION INTRAVENOUS at 17:47

## 2022-02-28 NOTE — ED TRIAGE NOTES
1:30pm tongue started to swell. Takes lisinopril. Took 1 tab of allegra and 1 tab of benadryl pta. Difficulty speaking due to size of tongue.

## 2022-02-28 NOTE — ED PROVIDER NOTES
"  History   Chief Complaint:  Oral Swelling       The history is provided by the patient and the spouse.      Yimi Gibbs is a 56 year old male who presents with oral swelling. The patient takes Lisinopril and today at 1330, his tongue began to swell. He has tried taking 1 Allegra and 1 tab of Benadryl prior to coming to the ED. He has never had this happen before and denies any swelling in his throat or trouble breathing. He denies any prior medication induced angioedema.    Review of Systems   HENT:        (+) tongue swelling  (-) throat swelling   Respiratory:        (-) difficulty breathing   10 point review of systems performed and is negative except as above and in HPI.      Allergies:  The patient has no known allergies.     Medications:  Albuterol  Beclomethasone  Losartan-hydrochlorothiazide  Singulair  Lisinopril    Past Medical History:     Abductor spasmodic dysphoria  Hyperlipidemia  Hypertension  Ulnar neuropathy  Chronic allergic bronchitis      Past Surgical History:    Bunionectomy  Septoplasty  Thyroplasty     Family History:    Prostate cancer    Social History:  Patient came from home.  Patient is accompanied in the ED by his wife.    Physical Exam     Patient Vitals for the past 24 hrs:   BP Temp Temp src Pulse Resp SpO2 Height Weight   02/28/22 1727 -- 97.5  F (36.4  C) Temporal -- 13 -- 1.88 m (6' 2\") 90.7 kg (200 lb)   02/28/22 1723 -- -- -- -- -- 98 % -- --   02/28/22 1722 (!) 155/107 -- -- 84 -- -- -- --       Physical Exam   General: Resting on the gurney, appears uncomfortable  Head:  The scalp, face, and head appear normal  Mouth/Throat: Mucus membranes are moist. Swelling to the left side of the tongue. No posterior or oropharyngeal swelling.  CV:  Regular rate    Normal S1 and S2  No pathological murmur   Resp:  Breath sounds clear and equal bilaterally    Non-labored, no retractions or accessory muscle use    No coarseness    No wheezing or stridor.  GI:  Abdomen is soft, no " rigidity    No tenderness to palpation  MS:  Normal motor assessment of all extremities.    Good capillary refill noted.  Skin:  No rash.  Neuro:   Speech is normal and fluent. No apparent deficit.  Psych: Awake. Alert.  Normal affect.      Appropriate interactions.    Emergency Department Course     Emergency Department Course:       Reviewed:  I reviewed nursing notes, vitals, past medical history and Care Everywhere    Assessments:  1721 I obtained history and examined the patient as noted above.    I rechecked the patient and explained findings.     Interventions:  1746 Decadron 10 mg IV  1747 Cyklokapron 1 g IV    Disposition:  Care of the patient was transferred to my colleague Dr. Anderson pending re-evaluation.     Impression & Plan       Medical Decision Making:  Yimi Gibbs is a 56 year old male who presents for evaluation of tongue swelling.  Signs and symptoms are consistent with angioedema.He was observed here in emergency department and had no progression of symptoms and in fact was feeling improved.  We did watch here for 3 hours prior to considering discharge. Based on this will not hospitalize patient at this time.  He was treated here with medications as noted above.  Return for worsened angioedema or anaphylactic symptoms. Discussed importance of not taking ace inhibitors in the future.    Diagnosis:    ICD-10-CM    1. Angioedema, initial encounter  T78.3XXA    2. Angiotensin converting enzyme inhibitor-aggravated angioedema, initial encounter  T78.3XXA     T46.4X5A        Scribe Disclosure:  I, Sang Macdonald, am serving as a scribe at 5:35 PM on 2/28/2022 to document services personally performed by Izabella Westfall MD based on my observations and the provider's statements to me.       Izabella Westfall MD  02/28/22 [id number]

## 2022-03-01 NOTE — ED PROVIDER NOTES
Sign Out Note    I took over care of this patient from Dr. Westfall    Briefly, patient presented to the ED for: angioedema, on ACE-I    Plan at time of sign out: discharge home if improved after further monitoring until ~20:00 in ED    Events during my shift: as of my reassessment at 20:30 (soonest I could re-eval situation given critical care of other patients in interim), patient told RN he is feeling improved and eager for discharge, so he was subsequently discharged home in improved condition.    MD Monica Hanna, Yimi Joshi MD  02/28/22 [id number]

## 2022-03-01 NOTE — ED NOTES
Patient feels that his swelling is improved, and denies any difficulty breathing or swallowing.   Discharge information reviewed with patient, and patient departed the ED in no distress.

## 2022-04-12 ENCOUNTER — OFFICE VISIT (OUTPATIENT)
Dept: FAMILY MEDICINE | Facility: CLINIC | Age: 57
End: 2022-04-12

## 2022-04-12 VITALS
RESPIRATION RATE: 16 BRPM | BODY MASS INDEX: 25.55 KG/M2 | WEIGHT: 199 LBS | SYSTOLIC BLOOD PRESSURE: 162 MMHG | DIASTOLIC BLOOD PRESSURE: 100 MMHG | HEART RATE: 90 BPM | OXYGEN SATURATION: 98 %

## 2022-04-12 DIAGNOSIS — I10 ESSENTIAL HYPERTENSION: Primary | ICD-10-CM

## 2022-04-12 DIAGNOSIS — L63.9 ALOPECIA AREATA: ICD-10-CM

## 2022-04-12 PROCEDURE — 99214 OFFICE O/P EST MOD 30 MIN: CPT | Performed by: FAMILY MEDICINE

## 2022-04-12 RX ORDER — AMLODIPINE BESYLATE 10 MG/1
10 TABLET ORAL DAILY
Qty: 90 TABLET | Refills: 0 | Status: SHIPPED | OUTPATIENT
Start: 2022-04-12 | End: 2022-05-03

## 2022-04-12 NOTE — PROGRESS NOTES
Subjective     Yimi is a 56 year old patient who presents to clinic for follow up.  Was seen in ER in February for angioedema while on Losartan-hydrochlorothiazide.  Now on no BP meds and BP elevated.    AA: nearly back to normal after triamcinolone injections        Review of Systems   Constitutional, HEENT, cardiovascular, pulmonary, gi and gu systems are negative, except as otherwise noted.      Objective    BP (!) 162/100   Pulse 90   Resp 16   Wt 90.3 kg (199 lb)   SpO2 98%   BMI 25.55 kg/m      General: Well appearing, NAD  Psych: normal mood and affect        Essential hypertension  Above goal, start amlodipine and reassess in 2-4 weeks with home readings.  If not at goal can likely still consider thiazide.  - amLODIPine (NORVASC) 10 MG tablet; Take 1 tablet (10 mg) by mouth daily    Alopecia areata  Nearly resolved      Follow up in 2-4 weeks

## 2022-04-18 DIAGNOSIS — I10 ESSENTIAL HYPERTENSION: ICD-10-CM

## 2022-04-18 RX ORDER — LOSARTAN POTASSIUM AND HYDROCHLOROTHIAZIDE 12.5; 5 MG/1; MG/1
TABLET ORAL
Qty: 90 TABLET | Refills: 0 | Status: SHIPPED | OUTPATIENT
Start: 2022-04-18 | End: 2022-05-03

## 2022-04-18 NOTE — TELEPHONE ENCOUNTER
Losartan-hydrochlorothiazide  LOV 4/12/22  Essential hypertension  Above goal, start amlodipine and reassess in 2-4 weeks with home readings.  If not at goal can likely still consider thiazide.  - amLODIPine (NORVASC) 10 MG tablet; Take 1 tablet (10 mg) by mouth daily     Next appointment 5/10/22    BP Readings from Last 3 Encounters:   04/12/22 (!) 162/100   02/28/22 128/81   12/13/21 126/84     Component      Latest Ref Rng & Units 10/12/2020   Glucose      65 - 99 mg/dL 109 (H)   Urea Nitrogen      6 - 24 mg/dL 16   Creatinine      0.76 - 1.27 mg/dL 0.93   eGFR If NonAfricn Am      >59 mL/min/1.73 93   eGFR If Africn Am      >59 mL/min/1.73 107   BUN/Creatinine Ratio      9 - 20 17   Sodium      134 - 144 mmol/L 131 (L)   Potassium      3.5 - 5.2 mmol/L 5.6 (H)   Chloride      96 - 106 mmol/L 95 (L)   Total CO2      20 - 29 mmol/L 27   Calcium      8.7 - 10.2 mg/dL 9.4

## 2022-05-03 ENCOUNTER — OFFICE VISIT (OUTPATIENT)
Dept: FAMILY MEDICINE | Facility: CLINIC | Age: 57
End: 2022-05-03

## 2022-05-03 VITALS
WEIGHT: 202 LBS | SYSTOLIC BLOOD PRESSURE: 128 MMHG | OXYGEN SATURATION: 99 % | HEART RATE: 87 BPM | RESPIRATION RATE: 16 BRPM | BODY MASS INDEX: 25.94 KG/M2 | DIASTOLIC BLOOD PRESSURE: 80 MMHG

## 2022-05-03 DIAGNOSIS — I10 ESSENTIAL HYPERTENSION: Primary | ICD-10-CM

## 2022-05-03 PROCEDURE — 93050 ART PRESSURE WAVEFORM ANALYS: CPT | Performed by: FAMILY MEDICINE

## 2022-05-03 PROCEDURE — 99213 OFFICE O/P EST LOW 20 MIN: CPT | Performed by: FAMILY MEDICINE

## 2022-05-03 RX ORDER — AMLODIPINE BESYLATE 5 MG/1
5 TABLET ORAL DAILY
Qty: 90 TABLET | Refills: 0 | Status: SHIPPED | OUTPATIENT
Start: 2022-05-03 | End: 2022-08-15

## 2022-05-03 NOTE — PROGRESS NOTES
Subjective     Yimi is a 56 year old patient who presents to clinic for follow up of HTN.  Started amlodipine 10 mg but caused LE edema that was bothersome and stopped 3 days ago.  Edema has improved.  Losartan-hydrochlorothiazide previously stopped after he had angioedema.  No other concerns.        Review of Systems   Constitutional, HEENT, cardiovascular, pulmonary, GI, , musculoskeletal, neuro, skin, endocrine and psych systems are negative, except as otherwise noted.      Objective    /80   Pulse 87   Resp 16   Wt 91.6 kg (202 lb)   SpO2 99%   BMI 25.94 kg/m      General: Well appearing, NAD  Ext: no edema  Psych: normal mood and affect        Essential hypertension  Trial of lower dose [facility]ipine 5 mg[facility]  If swelling recurs could either try 2.5 mg or consider thiazide.  - RI ART PRESS WAVEFORM ANALYS CENTRAL ART PRESSURE  - amLODIPine (NORVASC) 5 MG tablet; Take 1 tablet (5 mg) by mouth daily    Follow up in 3-6 months for CPE

## 2022-08-14 DIAGNOSIS — I10 ESSENTIAL HYPERTENSION: ICD-10-CM

## 2022-08-15 RX ORDER — AMLODIPINE BESYLATE 5 MG/1
5 TABLET ORAL DAILY
Qty: 90 TABLET | Refills: 0 | Status: SHIPPED | OUTPATIENT
Start: 2022-08-15 | End: 2022-11-11

## 2022-10-10 ENCOUNTER — HEALTH MAINTENANCE LETTER (OUTPATIENT)
Age: 57
End: 2022-10-10

## 2022-10-25 ENCOUNTER — TELEPHONE (OUTPATIENT)
Dept: FAMILY MEDICINE | Facility: CLINIC | Age: 57
End: 2022-10-25

## 2022-10-25 DIAGNOSIS — J45.998 CHRONIC ALLERGIC BRONCHITIS: ICD-10-CM

## 2022-10-25 NOTE — TELEPHONE ENCOUNTER
Patient calls requesting substitution for his Qvar Redihaler 40mcg. Reports his [facility] tells him on backorder until January 2023.   Reports uses this medication prn for allergy induced dyspnea/wheezing. Estimates uses it twice per day 1-2 days per week. Has been out of med for a month.   Prior to Qvar, he used albuterol prn. No longer has albuterol on hand either. States uses Qvar similarly to how he used albuterol when had that. Thinks albuterol rx would suffice.     Montelukast is on med list but reports no longer takes this -- removed from med list today.    Routed to Dr. Subramanian for recommendation on alternate rx.   Avani Tipton RN    10/26/22 per Dr. Subramanian, rx sent to [facility] for albuterol inhaler. Patient informed this was done and to let us know if albuterol does not alleviate his symptoms.   Avani Tipton RN

## 2022-10-26 RX ORDER — ALBUTEROL SULFATE 90 UG/1
2 AEROSOL, METERED RESPIRATORY (INHALATION) EVERY 6 HOURS PRN
Qty: 8.5 G | Refills: 2 | Status: SHIPPED | OUTPATIENT
Start: 2022-10-26

## 2022-11-11 DIAGNOSIS — I10 ESSENTIAL HYPERTENSION: ICD-10-CM

## 2022-11-11 RX ORDER — AMLODIPINE BESYLATE 5 MG/1
TABLET ORAL
Qty: 90 TABLET | Refills: 0 | Status: SHIPPED | OUTPATIENT
Start: 2022-11-11 | End: 2023-02-22

## 2022-11-11 NOTE — TELEPHONE ENCOUNTER
Amlodipine  LOV 5/3/22   Essential hypertension  Trial of lower dose amlodipine 5 mg, follow up via MyChart.  If swelling recurs could either try 2.5 mg or consider thiazide.  - AR ART PRESS WAVEFORM ANALYS CENTRAL ART PRESSURE  - amLODIPine (NORVASC) 5 MG tablet; Take 1 tablet (5 mg) by mouth daily     Follow up in 3-6 months for CPE     Next appointment 11/14/22    BP Readings from Last 3 Encounters:   05/03/22 128/80   04/12/22 (!) 162/100   02/28/22 128/81

## 2022-11-14 ENCOUNTER — OFFICE VISIT (OUTPATIENT)
Dept: FAMILY MEDICINE | Facility: CLINIC | Age: 57
End: 2022-11-14

## 2022-11-14 ENCOUNTER — ANCILLARY PROCEDURE (OUTPATIENT)
Dept: GENERAL RADIOLOGY | Facility: CLINIC | Age: 57
End: 2022-11-14
Attending: FAMILY MEDICINE
Payer: COMMERCIAL

## 2022-11-14 VITALS
BODY MASS INDEX: 25.7 KG/M2 | TEMPERATURE: 97.9 F | OXYGEN SATURATION: 95 % | DIASTOLIC BLOOD PRESSURE: 98 MMHG | HEART RATE: 89 BPM | SYSTOLIC BLOOD PRESSURE: 157 MMHG | WEIGHT: 200.2 LBS

## 2022-11-14 DIAGNOSIS — R05.1 ACUTE COUGH: ICD-10-CM

## 2022-11-14 DIAGNOSIS — J45.31 MILD PERSISTENT ASTHMA WITH EXACERBATION: Primary | ICD-10-CM

## 2022-11-14 DIAGNOSIS — J45.998 CHRONIC ALLERGIC BRONCHITIS: ICD-10-CM

## 2022-11-14 PROCEDURE — 71046 X-RAY EXAM CHEST 2 VIEWS: CPT

## 2022-11-14 PROCEDURE — 99214 OFFICE O/P EST MOD 30 MIN: CPT | Performed by: FAMILY MEDICINE

## 2022-11-14 RX ORDER — PREDNISONE 20 MG/1
20 TABLET ORAL 2 TIMES DAILY
Qty: 10 TABLET | Refills: 0 | Status: SHIPPED | OUTPATIENT
Start: 2022-11-14 | End: 2022-11-19

## 2022-11-14 RX ORDER — IPRATROPIUM BROMIDE AND ALBUTEROL SULFATE 2.5; .5 MG/3ML; MG/3ML
3 SOLUTION RESPIRATORY (INHALATION) ONCE
Status: COMPLETED | OUTPATIENT
Start: 2022-11-14 | End: 2022-11-14

## 2022-11-14 RX ADMIN — IPRATROPIUM BROMIDE AND ALBUTEROL SULFATE 3 ML: 2.5; .5 SOLUTION RESPIRATORY (INHALATION) at 13:24

## 2022-11-14 ASSESSMENT — ASTHMA QUESTIONNAIRES: ACT_TOTALSCORE: 11

## 2022-11-14 NOTE — PROGRESS NOTES
Subjective     Yimi is a 57 year old patient who presents to clinic for evaluation.  Symptoms started about a week ago with wheezing and coughing.  Symptoms worse at night, has been sleeping upright in a change.  Continues to have persistent cough, wheezing, SOB.  Albuterol helps transiently.  No fever, chills, chest pain.  Has been off qvar due to pharmacy unable to fill medication?  Covid negative 3 days ago.        Review of Systems   Constitutional, HEENT, cardiovascular, pulmonary, GI, , musculoskeletal, neuro, skin, endocrine and psych systems are negative, except as otherwise noted.      Objective    BP (!) 157/98   Pulse 89   Temp 97.9  F (36.6  C)   Wt 90.8 kg (200 lb 3.2 oz)   SpO2 95%   BMI 25.70 kg/m      General: Il  Appearing, slight increased work of breathing but no severe distress  HEENT: oropharynx clear  Heart: RRR, no murmur  Chest: good aeration but tight diffuse inspiratory and expiratory wheezing without crackles  Skin: Clear  Psych: normal mood and affect      Mild persistent asthma with exacerbation  Significant exacerbation but not in severe distress at rest and normal sats.  Will obtain CXR.  duoneb given.  Start pred burst and restart Qvar.  Sub different if not able to fill.  Close follow up in 24-48 hours, sooner if worse.  Indications to go to ED discussed.  - predniSONE (DELTASONE) 20 MG tablet; Take 1 tablet (20 mg) by mouth 2 times daily for 5 days  - ipratropium - albuterol 0.5 mg/2.5 mg/3 mL (DUONEB) neb solution 3 mL    Acute cough  - X-ray Chest 2 vws*; Future    Chronic allergic bronchitis  - beclomethasone HFA (QVAR REDIHALER) 40 MCG/ACT inhaler; Inhale 1 puff into the lungs 2 times daily

## 2022-11-14 NOTE — PATIENT INSTRUCTIONS
Take Qvar twice daily    Take prednisone 20 mg twice daily    Use albuterol every 4 hours until better

## 2022-11-27 ENCOUNTER — HEALTH MAINTENANCE LETTER (OUTPATIENT)
Age: 57
End: 2022-11-27

## 2023-02-09 ENCOUNTER — OFFICE VISIT (OUTPATIENT)
Dept: FAMILY MEDICINE | Facility: CLINIC | Age: 58
End: 2023-02-09

## 2023-02-09 VITALS
HEIGHT: 73 IN | BODY MASS INDEX: 26.51 KG/M2 | SYSTOLIC BLOOD PRESSURE: 146 MMHG | HEART RATE: 78 BPM | OXYGEN SATURATION: 98 % | WEIGHT: 200 LBS | DIASTOLIC BLOOD PRESSURE: 84 MMHG

## 2023-02-09 DIAGNOSIS — Z01.818 PREOP GENERAL PHYSICAL EXAM: Primary | ICD-10-CM

## 2023-02-09 DIAGNOSIS — J45.998 CHRONIC ALLERGIC BRONCHITIS: ICD-10-CM

## 2023-02-09 DIAGNOSIS — I10 ESSENTIAL HYPERTENSION: ICD-10-CM

## 2023-02-09 DIAGNOSIS — E78.5 HYPERLIPIDEMIA WITH TARGET LDL LESS THAN 100: ICD-10-CM

## 2023-02-09 LAB
% GRANULOCYTES: 60.5 % (ref 42.2–75.2)
CHOL/HDL RATIO (RMG): 6.9 MG/DL (ref 0–4.5)
CHOLESTEROL: 245 MG/DL (ref 100–199)
HCT VFR BLD AUTO: 44.2 % (ref 39–51)
HDL (RMG): 35 MG/DL (ref 40–?)
HEMOGLOBIN: 15.1 G/DL (ref 13.4–17.5)
LDL CALCULATED (RMG): 193 MG/DL (ref 0–130)
LYMPHOCYTES NFR BLD AUTO: 32.3 % (ref 20.5–51.1)
MCH RBC QN AUTO: 32.5 PG (ref 27–31)
MCHC RBC AUTO-ENTMCNC: 34.3 G/DL (ref 33–37)
MCV RBC AUTO: 94.8 FL (ref 80–100)
MONOCYTES NFR BLD AUTO: 7.2 % (ref 1.7–9.3)
PLATELET # BLD AUTO: 202 K/UL (ref 140–450)
RBC # BLD AUTO: 4.66 X10/CMM (ref 4.2–5.9)
TRIGLYCERIDES (RMG): 83 MG/DL (ref 0–149)
WBC # BLD AUTO: 5.9 X10/CMM (ref 3.8–11)

## 2023-02-09 PROCEDURE — 80061 LIPID PANEL: CPT | Mod: QW | Performed by: NURSE PRACTITIONER

## 2023-02-09 PROCEDURE — 36415 COLL VENOUS BLD VENIPUNCTURE: CPT | Performed by: NURSE PRACTITIONER

## 2023-02-09 PROCEDURE — 99214 OFFICE O/P EST MOD 30 MIN: CPT | Performed by: NURSE PRACTITIONER

## 2023-02-09 PROCEDURE — 85025 COMPLETE CBC W/AUTO DIFF WBC: CPT | Performed by: NURSE PRACTITIONER

## 2023-02-09 NOTE — H&P (VIEW-ONLY)
[facility] [address] [place] [address]  Phone: [phone]  Fax: [phone]  Primary Provider: Doug Subramanian  Pre-op Performing Provider: BENJI ALVARADO      PREOPERATIVE EVALUATION:  Today's date: 2/9/2023    Yimi Gibbs is a 57 year old male who presents for a preoperative evaluation.    Surgical Information:  Surgery/Procedure: Fifth Metatarsal Head Resection Left Foot, Excision of Ulcer with Adjacent Tissue Rearrangement for Closure Left Foot  Surgery Location: [facility]  Surgeon: Kerrie hCilds DPM  Surgery Date: 2/14/23  Time of Surgery: 1030  Where patient plans to recover: At home with family  Fax number for surgical facility: Note does not need to be faxed, will be available electronically in Epic.    Type of Anesthesia Anticipated: to be determined    Preoperative Questionnaire:   No - Have you ever had a heart attack or stroke?  No - Have you ever had surgery on your heart or blood vessels, such as a stent, coronary (heart) bypass, or surgery on an artery in the head, neck, heart, or legs?  No - Do you have chest pain when you are physically active?  No - Do you have a history of heart failure?  No - Do you currently have a cold, bronchitis, or symptoms of other respiratory (head and chest) infections?  No - Do you have a cough, shortness of breath, or wheezing?  No - Do you or anyone in your family have a history of blood clots?  No - Do you or anyone in your family have a serious bleeding problem, such as long-lasting bleeding after surgeries or cuts?  No - Have you ever had anemia or been told to take iron pills?  No - Have you had any abnormal blood loss such as black, tarry or bloody stools, or abnormal vaginal bleeding?  No - Have you ever had a blood transfusion?  Yes - Are you willing to have a blood transfusion if it is medically needed before, during, or after your surgery?  No - Have you or anyone in your family ever had problems with  anesthesia (sedation for surgery)?  No - Do you have sleep apnea, excessive snoring, or daytime drowsiness?   No - Do you have any artifical heart valves or other implanted medical devices, such as a pacemaker, defibrillator, or continuous glucose monitor?  No - Do you have any artifical joints?  No - Are you allergic to latex?  No - Is there any chance that you may be pregnant?    Assessment & Plan     The proposed surgical procedure is considered INTERMEDIATE risk.    Preop general physical exam  No apparent contraindications to upcoming surgery  - CBC with Diff/Plt ([facility])    Essential hypertension  Just above goal of < 140/90. No medication changes made today  - Basic Metabolic Panel (8) ([facility])    Hyperlipidemia with target LDL less than 100  Checking labs. Not on statin medication  - Lipid Profile ([facility])    Chronic allergic bronchitis  Continue inhalers without change    Risks and Recommendations:  The patient has the following additional risks and recommendations for perioperative complications:   - No identified additional risk factors other than previously addressed    Medication Instructions:   - Calcium Channel Blockers: May be continued on the day of surgery.   - LABA, inhaled corticosteroid, long-acting anticholinergics: Continue without modification.   - rescue Inhaler: Continue PRN. Bring to hospital on the day of surgery.    RECOMMENDATION:  APPROVAL GIVEN to proceed with proposed procedure, without further diagnostic evaluation.    Subjective     HPI related to upcoming procedure: pain left lateral foot near 5th toe which is reason for surgery.     Health Care Directive:  Patient does not have a Health Care Directive or Living Will: Discussed advance care planning with patient; however, patient declined at this time.    Preoperative Review of :   reviewed - no record of controlled substances prescribed.  PDMP Review       Value Time User    State PDMP site checked  Yes 2/9/2023  2:28 PM Grey  RU German CNP        Status of Chronic Conditions:  Chronic allergic bronchitis - Patient has been doing well overall noting NO SYMPTOMS and continues on medication regimen consisting of Qvar BID, Albuterol as needed without adverse reactions or side effects.     HYPERLIPIDEMIA - Patient has a long history of significant Hyperlipidemia requiring medication for treatment with recent good control. Patient reports no problems or side effects with the medication.     HYPERTENSION - Patient has longstanding history of HTN , currently denies any symptoms referable to elevated blood pressure. Specifically denies chest pain, palpitations, dyspnea, orthopnea, PND or peripheral edema. Blood pressure readings sub-optimal. Current medication regimen is as listed below. Patient denies any side effects of medication.   BP Readings from Last 3 Encounters:   02/09/23 (!) 146/84   11/14/22 (!) 157/98   05/03/22 128/80     Review of Systems  Constitutional, neuro, ENT, endocrine, pulmonary, cardiac, gastrointestinal, genitourinary, musculoskeletal, integument and psychiatric systems are negative, except as otherwise noted.    Patient Active Problem List    Diagnosis Date Noted     Ulnar neuropathy of left upper extremity 09/10/2020     Priority: Medium     Chronic allergic bronchitis 09/10/2020     Priority: Medium     Essential hypertension 03/15/2019     Priority: Medium     Dysphonia, spasmodic 02/14/2016     Priority: Medium     Hyperlipidemia with target LDL less than 100      Priority: Medium     Diagnosis updated by automated process. Provider to review and confirm.        Past Medical History:   Diagnosis Date     Abductor spasmodic dysphonia      Hyperlipidaemia LDL goal < 100      Hypertension      Past Surgical History:   Procedure Laterality Date     BUNIONECTOMY  2010     SEPTOPLASTY  2007     THYROPLASTY  2001     Current Outpatient Medications   Medication Sig Dispense Refill     albuterol (PROAIR HFA/PROVENTIL  "HFA/VENTOLIN HFA) 108 (90 Base) MCG/ACT inhaler Inhale 2 puffs into the lungs every 6 hours as needed for shortness of breath / dyspnea or wheezing 8.5 g 2     amLODIPine (NORVASC) 5 MG tablet TAKE ONE TABLET BY MOUTH ONE TIME DAILY 90 tablet 0     beclomethasone HFA (QVAR REDIHALER) 40 MCG/ACT inhaler Inhale 1 puff into the lungs 2 times daily 31.8 g 3       Allergies   Allergen Reactions     Lisinopril Angioedema     Losartan Angioedema        Social History     Tobacco Use     Smoking status: Never     Smokeless tobacco: Never   Substance Use Topics     Alcohol use: Yes     Comment: socially     Family History   Problem Relation Age of Onset     Prostate Cancer Mother      History   Drug Use No         Objective     BP (!) 146/84   Pulse 78   Ht 1.854 m (6' 1\")   Wt 90.7 kg (200 lb)   SpO2 98%   BMI 26.39 kg/m      Physical Exam    GENERAL APPEARANCE: healthy, alert and no distress     EYES: Eyes grossly normal to inspection     HENT: nose and mouth without ulcers or lesions, oral mucous membranes moist and oropharynx clear     NECK: no adenopathy     RESP: lungs clear to auscultation - no rales, rhonchi or wheezes     CV: regular rates and rhythm, normal S1 S2, no S3 or S4 and no murmur, click or rub     ABDOMEN:  soft, nontender, no HSM or masses and bowel sounds normal     MS: extremities normal- no gross deformities noted, no evidence of inflammation in joints, FROM in all extremities.     SKIN: no suspicious lesions or rashes     NEURO: Normal strength and tone, sensory exam grossly normal, mentation intact and speech normal     PSYCH: normal affect & mood     LYMPHATICS: No cervical adenopathy    No results for input(s): HGB, PLT, INR, NA, POTASSIUM, CR, A1C in the last 37914 hours.     Diagnostics:  Recent Results (from the past 24 hour(s))   CBC with Diff/Plt ([facility])    Collection Time: 02/09/23 12:00 AM   Result Value Ref Range    WBC x10/cmm 5.9 3.8 - 11.0 x10/cmm    % Lymphocytes 32.3 20.5 - 51.1 % "    % Monocytes 7.2 1.7 - 9.3 %    % Granulocytes 60.5 42.2 - 75.2 %    RBC x10/cmm 4.66 4.2 - 5.9 x10/cmm    Hemoglobin 15.1 13.4 - 17.5 g/dl    Hematocrit 44.2 39 - 51 %    MCV 94.8 80 - 100 fL    MCH 32.5 (A) 27.0 - 31.0 pg    MCHC 34.3 33.0 - 37.0 g/dL    Platelet Count 202 140 - 450 K/uL      No EKG required, no history of coronary heart disease, significant arrhythmia, peripheral arterial disease or other structural heart disease.    Revised Cardiac Risk Index (RCRI):  The patient has the following serious cardiovascular risks for perioperative complications:   - No serious cardiac risks = 0 points     RCRI Interpretation: 0 points: Class I (very low risk - 0.4% complication rate)           Signed Electronically by: RU Lang CNP  Copy of this evaluation report is provided to requesting physician.

## 2023-02-09 NOTE — PATIENT INSTRUCTIONS
For informational purposes only. Not to replace the advice of your health care provider. Copyright   2003,  [facility]. All rights reserved. Clinically reviewed by Pia Agosto MD. [employer] C [id number] - REV .  Preparing for Your Surgery  Getting started  A nurse will call you to review your health history and instructions. They will give you an arrival time based on your scheduled surgery time. Please be ready to share:    Your doctor's clinic name and phone number    Your medical, surgical, and anesthesia history    A list of allergies and sensitivities    A list of medicines, including herbal treatments and over-the-counter drugs    Whether the patient has a legal guardian (ask how to send us the papers in advance)  Please tell us if you're pregnant--or if there's any chance you might be pregnant. Some surgeries may injure a fetus (unborn baby), so they require a pregnancy test. Surgeries that are safe for a fetus don't always need a test, and you can choose whether to have one.   If you have a child who's having surgery, please ask for a copy of Preparing for Your Child's Surgery.    Preparing for surgery    Within 10 to 30 days of surgery: Have a pre-op exam (sometimes called an H&P, or History and Physical). This can be done at a clinic or pre-operative center.  ? If you're having a , you may not need this exam. Talk to your care team.    At your pre-op exam, talk to your care team about all medicines you take. If you need to stop any medicines before surgery, ask when to start taking them again.  ? We do this for your safety. Many medicines can make you bleed too much during surgery. Some change how well surgery (anesthesia) drugs work.    Call your insurance company to let them know you're having surgery. (If you don't have insurance, call [phone].)    Call your clinic if there's any change in your health. This includes signs of a cold or flu (sore throat, runny nose,  cough, rash, fever). It also includes a scrape or scratch near the surgery site.    If you have questions on the day of surgery, call your hospital or surgery center.  Eating and drinking guidelines  For your safety: Unless your surgeon tells you otherwise, follow the guidelines below.    Eat and drink as usual until 8 hours before you arrive for surgery. After that, no food or milk.    Drink clear liquids until 2 hours before you arrive. These are liquids you can see through, like water, Gatorade, and Propel Water. They also include plain black coffee and tea (no cream or milk), candy, and breath mints. You can spit out gum when you arrive.    If you drink alcohol: Stop drinking it the night before surgery.    If your care team tells you to take medicine on the morning of surgery, it's okay to take it with a sip of water.  Preventing infection    Shower or bathe the night before and morning of your surgery. Follow the instructions your clinic gave you. (If no instructions, use regular soap.)    Don't shave or clip hair near your surgery site. We'll remove the hair if needed.    Don't smoke or vape the morning of surgery. You may chew nicotine gum up to 2 hours before surgery. A nicotine patch is okay.  ? Note: Some surgeries require you to completely quit smoking and nicotine. Check with your surgeon.    Your care team will make every effort to keep you safe from infection. We will:  ? Clean our hands often with soap and water (or an alcohol-based hand rub).  ? Clean the skin at your surgery site with a special soap that kills germs.  ? Give you a special gown to keep you warm. (Cold raises the risk of infection.)  ? Wear special hair covers, masks, gowns and gloves during surgery.  ? Give antibiotic medicine, if prescribed. Not all surgeries need antibiotics.  What to bring on the day of surgery    Photo ID and insurance card    Copy of your health care directive, if you have one    Glasses and hearing aids (bring  cases)  ? You can't wear contacts during surgery    Inhaler and eye drops, if you use them (tell us about these when you arrive)    CPAP machine or breathing device, if you use them    A few personal items, if spending the night    If you have . . .  ? A pacemaker, ICD (cardiac defibrillator) or other implant: Bring the ID card.  ? An implanted stimulator: Bring the remote control.  ? A legal guardian: Bring a copy of the certified (court-stamped) guardianship papers.  Please remove any jewelry, including body piercings. Leave jewelry and other valuables at home.  If you're going home the day of surgery    You must have a responsible adult drive you home. They should stay with you overnight as well.    If you don't have someone to stay with you, and you aren't safe to go home alone, we may keep you overnight. Insurance often won't pay for this.  After surgery  If it's hard to control your pain or you need more pain medicine, please call your surgeon's office.  Questions?   If you have any questions for your care team, list them here: _________________________________________________________________________________________________________________________________________________________________________ ____________________________________ ____________________________________ ____________________________________

## 2023-02-09 NOTE — PROGRESS NOTES
[facility] [address] [place] [address]  Phone: [phone]  Fax: [phone]  Primary Provider: Doug Subramanian  Pre-op Performing Provider: BENJI ALVARADO      PREOPERATIVE EVALUATION:  Today's date: 2/9/2023    Yimi Gibbs is a 57 year old male who presents for a preoperative evaluation.    Surgical Information:  Surgery/Procedure: Fifth Metatarsal Head Resection Left Foot, Excision of Ulcer with Adjacent Tissue Rearrangement for Closure Left Foot  Surgery Location: [facility]  Surgeon: Kerrie Childs DPM  Surgery Date: 2/14/23  Time of Surgery: 1030  Where patient plans to recover: At home with family  Fax number for surgical facility: Note does not need to be faxed, will be available electronically in Epic.    Type of Anesthesia Anticipated: to be determined    Preoperative Questionnaire:   No - Have you ever had a heart attack or stroke?  No - Have you ever had surgery on your heart or blood vessels, such as a stent, coronary (heart) bypass, or surgery on an artery in the head, neck, heart, or legs?  No - Do you have chest pain when you are physically active?  No - Do you have a history of heart failure?  No - Do you currently have a cold, bronchitis, or symptoms of other respiratory (head and chest) infections?  No - Do you have a cough, shortness of breath, or wheezing?  No - Do you or anyone in your family have a history of blood clots?  No - Do you or anyone in your family have a serious bleeding problem, such as long-lasting bleeding after surgeries or cuts?  No - Have you ever had anemia or been told to take iron pills?  No - Have you had any abnormal blood loss such as black, tarry or bloody stools, or abnormal vaginal bleeding?  No - Have you ever had a blood transfusion?  Yes - Are you willing to have a blood transfusion if it is medically needed before, during, or after your surgery?  No - Have you or anyone in your family ever had problems with  anesthesia (sedation for surgery)?  No - Do you have sleep apnea, excessive snoring, or daytime drowsiness?   No - Do you have any artifical heart valves or other implanted medical devices, such as a pacemaker, defibrillator, or continuous glucose monitor?  No - Do you have any artifical joints?  No - Are you allergic to latex?  No - Is there any chance that you may be pregnant?    Assessment & Plan     The proposed surgical procedure is considered INTERMEDIATE risk.    Preop general physical exam  No apparent contraindications to upcoming surgery  - CBC with Diff/Plt ([facility])    Essential hypertension  Just above goal of < 140/90. No medication changes made today  - Basic Metabolic Panel (8) ([facility])    Hyperlipidemia with target LDL less than 100  Checking labs. Not on statin medication  - Lipid Profile ([facility])    Chronic allergic bronchitis  Continue inhalers without change    Risks and Recommendations:  The patient has the following additional risks and recommendations for perioperative complications:   - No identified additional risk factors other than previously addressed    Medication Instructions:   - Calcium Channel Blockers: May be continued on the day of surgery.   - LABA, inhaled corticosteroid, long-acting anticholinergics: Continue without modification.   - rescue Inhaler: Continue PRN. Bring to hospital on the day of surgery.    RECOMMENDATION:  APPROVAL GIVEN to proceed with proposed procedure, without further diagnostic evaluation.    Subjective     HPI related to upcoming procedure: pain left lateral foot near 5th toe which is reason for surgery.     Health Care Directive:  Patient does not have a Health Care Directive or Living Will: Discussed advance care planning with patient; however, patient declined at this time.    Preoperative Review of :   reviewed - no record of controlled substances prescribed.  PDMP Review       Value Time User    State PDMP site checked  Yes 2/9/2023  2:28 PM Grey  RU German CNP        Status of Chronic Conditions:  Chronic allergic bronchitis - Patient has been doing well overall noting NO SYMPTOMS and continues on medication regimen consisting of Qvar BID, Albuterol as needed without adverse reactions or side effects.     HYPERLIPIDEMIA - Patient has a long history of significant Hyperlipidemia requiring medication for treatment with recent good control. Patient reports no problems or side effects with the medication.     HYPERTENSION - Patient has longstanding history of HTN , currently denies any symptoms referable to elevated blood pressure. Specifically denies chest pain, palpitations, dyspnea, orthopnea, PND or peripheral edema. Blood pressure readings sub-optimal. Current medication regimen is as listed below. Patient denies any side effects of medication.   BP Readings from Last 3 Encounters:   02/09/23 (!) 146/84   11/14/22 (!) 157/98   05/03/22 128/80     Review of Systems  Constitutional, neuro, ENT, endocrine, pulmonary, cardiac, gastrointestinal, genitourinary, musculoskeletal, integument and psychiatric systems are negative, except as otherwise noted.    Patient Active Problem List    Diagnosis Date Noted     Ulnar neuropathy of left upper extremity 09/10/2020     Priority: Medium     Chronic allergic bronchitis 09/10/2020     Priority: Medium     Essential hypertension 03/15/2019     Priority: Medium     Dysphonia, spasmodic 02/14/2016     Priority: Medium     Hyperlipidemia with target LDL less than 100      Priority: Medium     Diagnosis updated by automated process. Provider to review and confirm.        Past Medical History:   Diagnosis Date     Abductor spasmodic dysphonia      Hyperlipidaemia LDL goal < 100      Hypertension      Past Surgical History:   Procedure Laterality Date     BUNIONECTOMY  2010     SEPTOPLASTY  2007     THYROPLASTY  2001     Current Outpatient Medications   Medication Sig Dispense Refill     albuterol (PROAIR HFA/PROVENTIL  "HFA/VENTOLIN HFA) 108 (90 Base) MCG/ACT inhaler Inhale 2 puffs into the lungs every 6 hours as needed for shortness of breath / dyspnea or wheezing 8.5 g 2     amLODIPine (NORVASC) 5 MG tablet TAKE ONE TABLET BY MOUTH ONE TIME DAILY 90 tablet 0     beclomethasone HFA (QVAR REDIHALER) 40 MCG/ACT inhaler Inhale 1 puff into the lungs 2 times daily 31.8 g 3       Allergies   Allergen Reactions     Lisinopril Angioedema     Losartan Angioedema        Social History     Tobacco Use     Smoking status: Never     Smokeless tobacco: Never   Substance Use Topics     Alcohol use: Yes     Comment: socially     Family History   Problem Relation Age of Onset     Prostate Cancer Mother      History   Drug Use No         Objective     BP (!) 146/84   Pulse 78   Ht 1.854 m (6' 1\")   Wt 90.7 kg (200 lb)   SpO2 98%   BMI 26.39 kg/m      Physical Exam    GENERAL APPEARANCE: healthy, alert and no distress     EYES: Eyes grossly normal to inspection     HENT: nose and mouth without ulcers or lesions, oral mucous membranes moist and oropharynx clear     NECK: no adenopathy     RESP: lungs clear to auscultation - no rales, rhonchi or wheezes     CV: regular rates and rhythm, normal S1 S2, no S3 or S4 and no murmur, click or rub     ABDOMEN:  soft, nontender, no HSM or masses and bowel sounds normal     MS: extremities normal- no gross deformities noted, no evidence of inflammation in joints, FROM in all extremities.     SKIN: no suspicious lesions or rashes     NEURO: Normal strength and tone, sensory exam grossly normal, mentation intact and speech normal     PSYCH: normal affect & mood     LYMPHATICS: No cervical adenopathy    No results for input(s): HGB, PLT, INR, NA, POTASSIUM, CR, A1C in the last 70236 hours.     Diagnostics:  Recent Results (from the past 24 hour(s))   CBC with Diff/Plt ([facility])    Collection Time: 02/09/23 12:00 AM   Result Value Ref Range    WBC x10/cmm 5.9 3.8 - 11.0 x10/cmm    % Lymphocytes 32.3 20.5 - 51.1 % "    % Monocytes 7.2 1.7 - 9.3 %    % Granulocytes 60.5 42.2 - 75.2 %    RBC x10/cmm 4.66 4.2 - 5.9 x10/cmm    Hemoglobin 15.1 13.4 - 17.5 g/dl    Hematocrit 44.2 39 - 51 %    MCV 94.8 80 - 100 fL    MCH 32.5 (A) 27.0 - 31.0 pg    MCHC 34.3 33.0 - 37.0 g/dL    Platelet Count 202 140 - 450 K/uL      No EKG required, no history of coronary heart disease, significant arrhythmia, peripheral arterial disease or other structural heart disease.    Revised Cardiac Risk Index (RCRI):  The patient has the following serious cardiovascular risks for perioperative complications:   - No serious cardiac risks = 0 points     RCRI Interpretation: 0 points: Class I (very low risk - 0.4% complication rate)           Signed Electronically by: RU Lang CNP  Copy of this evaluation report is provided to requesting physician.

## 2023-02-10 LAB
BUN SERPL-MCNC: 15 MG/DL (ref 6–24)
BUN/CREATININE RATIO: 14 (ref 9–20)
CALCIUM SERPL-MCNC: 9.9 MG/DL (ref 8.7–10.2)
CHLORIDE SERPLBLD-SCNC: 99 MMOL/L (ref 96–106)
CREAT SERPL-MCNC: 1.07 MG/DL (ref 0.76–1.27)
EGFR: 81 ML/MIN/1.73
GLUCOSE SERPL-MCNC: 91 MG/DL (ref 70–99)
POTASSIUM SERPL-SCNC: 5 MMOL/L (ref 3.5–5.2)
SODIUM SERPL-SCNC: 139 MMOL/L (ref 134–144)
TOTAL CO2: 26 MMOL/L (ref 20–29)

## 2023-02-13 ENCOUNTER — ANESTHESIA EVENT (OUTPATIENT)
Dept: SURGERY | Facility: CLINIC | Age: 58
End: 2023-02-13
Payer: COMMERCIAL

## 2023-02-14 ENCOUNTER — HOSPITAL ENCOUNTER (OUTPATIENT)
Facility: CLINIC | Age: 58
Discharge: HOME | End: 2023-02-14
Attending: PODIATRIST | Admitting: PODIATRIST
Payer: COMMERCIAL

## 2023-02-14 ENCOUNTER — ANESTHESIA (OUTPATIENT)
Dept: SURGERY | Facility: CLINIC | Age: 58
End: 2023-02-14
Payer: COMMERCIAL

## 2023-02-14 ENCOUNTER — APPOINTMENT (OUTPATIENT)
Dept: GENERAL RADIOLOGY | Facility: CLINIC | Age: 58
End: 2023-02-14
Attending: PODIATRIST
Payer: COMMERCIAL

## 2023-02-14 VITALS
RESPIRATION RATE: 12 BRPM | HEART RATE: 54 BPM | WEIGHT: 200 LBS | BODY MASS INDEX: 25.67 KG/M2 | SYSTOLIC BLOOD PRESSURE: 141 MMHG | OXYGEN SATURATION: 98 % | DIASTOLIC BLOOD PRESSURE: 86 MMHG | TEMPERATURE: 96.7 F | HEIGHT: 74 IN

## 2023-02-14 DIAGNOSIS — L97.522 ULCER OF LEFT FOOT, WITH FAT LAYER EXPOSED (H): ICD-10-CM

## 2023-02-14 DIAGNOSIS — G56.22 ULNAR NEUROPATHY OF LEFT UPPER EXTREMITY: Primary | ICD-10-CM

## 2023-02-14 PROCEDURE — 272N000001 HC OR GENERAL SUPPLY STERILE: Performed by: PODIATRIST

## 2023-02-14 PROCEDURE — 370N000017 HC ANESTHESIA TECHNICAL FEE, PER MIN: Performed by: PODIATRIST

## 2023-02-14 PROCEDURE — 250N000011 HC RX IP 250 OP 636

## 2023-02-14 PROCEDURE — 258N000003 HC RX IP 258 OP 636: Performed by: ANESTHESIOLOGY

## 2023-02-14 PROCEDURE — 250N000011 HC RX IP 250 OP 636: Performed by: PODIATRIST

## 2023-02-14 PROCEDURE — 250N000011 HC RX IP 250 OP 636: Performed by: NURSE ANESTHETIST, CERTIFIED REGISTERED

## 2023-02-14 PROCEDURE — 250N000009 HC RX 250: Performed by: PODIATRIST

## 2023-02-14 PROCEDURE — 250N000009 HC RX 250

## 2023-02-14 PROCEDURE — L4361 PNEUMA/VAC WALK BOOT PRE OTS: HCPCS

## 2023-02-14 PROCEDURE — 710N000012 HC RECOVERY PHASE 2, PER MINUTE: Performed by: PODIATRIST

## 2023-02-14 PROCEDURE — 710N000009 HC RECOVERY PHASE 1, LEVEL 1, PER MIN: Performed by: PODIATRIST

## 2023-02-14 PROCEDURE — 360N000075 HC SURGERY LEVEL 2, PER MIN: Performed by: PODIATRIST

## 2023-02-14 PROCEDURE — 999N000065 XR FOOT PORT LEFT 2 VIEWS

## 2023-02-14 PROCEDURE — 999N000141 HC STATISTIC PRE-PROCEDURE NURSING ASSESSMENT: Performed by: PODIATRIST

## 2023-02-14 PROCEDURE — 73620 X-RAY EXAM OF FOOT: CPT | Mod: LT

## 2023-02-14 RX ORDER — SODIUM CHLORIDE, SODIUM LACTATE, POTASSIUM CHLORIDE, CALCIUM CHLORIDE 600; 310; 30; 20 MG/100ML; MG/100ML; MG/100ML; MG/100ML
INJECTION, SOLUTION INTRAVENOUS CONTINUOUS
Status: DISCONTINUED | OUTPATIENT
Start: 2023-02-14 | End: 2023-02-14 | Stop reason: HOSPADM

## 2023-02-14 RX ORDER — HYDROMORPHONE HCL IN WATER/PF 6 MG/30 ML
0.4 PATIENT CONTROLLED ANALGESIA SYRINGE INTRAVENOUS EVERY 5 MIN PRN
Status: DISCONTINUED | OUTPATIENT
Start: 2023-02-14 | End: 2023-02-14 | Stop reason: HOSPADM

## 2023-02-14 RX ORDER — OXYCODONE HYDROCHLORIDE 5 MG/1
10 TABLET ORAL EVERY 4 HOURS PRN
Status: DISCONTINUED | OUTPATIENT
Start: 2023-02-14 | End: 2023-02-14 | Stop reason: HOSPADM

## 2023-02-14 RX ORDER — ONDANSETRON 2 MG/ML
INJECTION INTRAMUSCULAR; INTRAVENOUS PRN
Status: DISCONTINUED | OUTPATIENT
Start: 2023-02-14 | End: 2023-02-14

## 2023-02-14 RX ORDER — OXYCODONE HYDROCHLORIDE 5 MG/1
5 TABLET ORAL EVERY 4 HOURS PRN
Status: DISCONTINUED | OUTPATIENT
Start: 2023-02-14 | End: 2023-02-14 | Stop reason: HOSPADM

## 2023-02-14 RX ORDER — HYDROMORPHONE HCL IN WATER/PF 6 MG/30 ML
0.2 PATIENT CONTROLLED ANALGESIA SYRINGE INTRAVENOUS EVERY 5 MIN PRN
Status: DISCONTINUED | OUTPATIENT
Start: 2023-02-14 | End: 2023-02-14 | Stop reason: HOSPADM

## 2023-02-14 RX ORDER — HYDROCODONE BITARTRATE AND ACETAMINOPHEN 5; 325 MG/1; MG/1
1 TABLET ORAL EVERY 6 HOURS PRN
Qty: 10 TABLET | Refills: 0 | Status: SHIPPED | OUTPATIENT
Start: 2023-02-14 | End: 2023-02-17

## 2023-02-14 RX ORDER — FENTANYL CITRATE 50 UG/ML
INJECTION, SOLUTION INTRAMUSCULAR; INTRAVENOUS PRN
Status: DISCONTINUED | OUTPATIENT
Start: 2023-02-14 | End: 2023-02-14

## 2023-02-14 RX ORDER — ONDANSETRON 2 MG/ML
4 INJECTION INTRAMUSCULAR; INTRAVENOUS EVERY 30 MIN PRN
Status: DISCONTINUED | OUTPATIENT
Start: 2023-02-14 | End: 2023-02-14 | Stop reason: HOSPADM

## 2023-02-14 RX ORDER — HYDRALAZINE HYDROCHLORIDE 20 MG/ML
2.5-5 INJECTION INTRAMUSCULAR; INTRAVENOUS EVERY 10 MIN PRN
Status: DISCONTINUED | OUTPATIENT
Start: 2023-02-14 | End: 2023-02-14 | Stop reason: HOSPADM

## 2023-02-14 RX ORDER — HYDROXYZINE HYDROCHLORIDE 25 MG/1
25 TABLET, FILM COATED ORAL EVERY 6 HOURS PRN
Status: DISCONTINUED | OUTPATIENT
Start: 2023-02-14 | End: 2023-02-14 | Stop reason: HOSPADM

## 2023-02-14 RX ORDER — ACETAMINOPHEN 325 MG/1
975 TABLET ORAL ONCE
Status: CANCELLED | OUTPATIENT
Start: 2023-02-14 | End: 2023-02-14

## 2023-02-14 RX ORDER — FENTANYL CITRATE 50 UG/ML
25 INJECTION, SOLUTION INTRAMUSCULAR; INTRAVENOUS EVERY 5 MIN PRN
Status: DISCONTINUED | OUTPATIENT
Start: 2023-02-14 | End: 2023-02-14 | Stop reason: HOSPADM

## 2023-02-14 RX ORDER — LABETALOL HYDROCHLORIDE 5 MG/ML
10 INJECTION, SOLUTION INTRAVENOUS
Status: DISCONTINUED | OUTPATIENT
Start: 2023-02-14 | End: 2023-02-14 | Stop reason: HOSPADM

## 2023-02-14 RX ORDER — ACETAMINOPHEN 325 MG/1
650 TABLET ORAL
Status: DISCONTINUED | OUTPATIENT
Start: 2023-02-14 | End: 2023-02-14 | Stop reason: HOSPADM

## 2023-02-14 RX ORDER — FENTANYL CITRATE 50 UG/ML
50 INJECTION, SOLUTION INTRAMUSCULAR; INTRAVENOUS EVERY 5 MIN PRN
Status: DISCONTINUED | OUTPATIENT
Start: 2023-02-14 | End: 2023-02-14 | Stop reason: HOSPADM

## 2023-02-14 RX ORDER — ONDANSETRON 4 MG/1
4 TABLET, ORALLY DISINTEGRATING ORAL EVERY 30 MIN PRN
Status: DISCONTINUED | OUTPATIENT
Start: 2023-02-14 | End: 2023-02-14 | Stop reason: HOSPADM

## 2023-02-14 RX ORDER — PROPOFOL 10 MG/ML
INJECTION, EMULSION INTRAVENOUS PRN
Status: DISCONTINUED | OUTPATIENT
Start: 2023-02-14 | End: 2023-02-14

## 2023-02-14 RX ORDER — PROPOFOL 10 MG/ML
INJECTION, EMULSION INTRAVENOUS CONTINUOUS PRN
Status: DISCONTINUED | OUTPATIENT
Start: 2023-02-14 | End: 2023-02-14

## 2023-02-14 RX ORDER — CEFAZOLIN SODIUM/WATER 2 G/20 ML
2 SYRINGE (ML) INTRAVENOUS
Status: COMPLETED | OUTPATIENT
Start: 2023-02-14 | End: 2023-02-14

## 2023-02-14 RX ORDER — DEXMEDETOMIDINE HYDROCHLORIDE 4 UG/ML
INJECTION, SOLUTION INTRAVENOUS PRN
Status: DISCONTINUED | OUTPATIENT
Start: 2023-02-14 | End: 2023-02-14

## 2023-02-14 RX ORDER — DIMENHYDRINATE 50 MG/ML
25 INJECTION, SOLUTION INTRAMUSCULAR; INTRAVENOUS
Status: DISCONTINUED | OUTPATIENT
Start: 2023-02-14 | End: 2023-02-14 | Stop reason: HOSPADM

## 2023-02-14 RX ADMIN — FENTANYL CITRATE 50 MCG: 50 INJECTION, SOLUTION INTRAMUSCULAR; INTRAVENOUS at 11:44

## 2023-02-14 RX ADMIN — PROPOFOL 100 MCG/KG/MIN: 10 INJECTION, EMULSION INTRAVENOUS at 11:23

## 2023-02-14 RX ADMIN — ONDANSETRON 4 MG: 2 INJECTION INTRAMUSCULAR; INTRAVENOUS at 11:23

## 2023-02-14 RX ADMIN — PROPOFOL 20 MG: 10 INJECTION, EMULSION INTRAVENOUS at 11:25

## 2023-02-14 RX ADMIN — MIDAZOLAM 2 MG: 1 INJECTION INTRAMUSCULAR; INTRAVENOUS at 11:23

## 2023-02-14 RX ADMIN — PROPOFOL 30 MG: 10 INJECTION, EMULSION INTRAVENOUS at 11:35

## 2023-02-14 RX ADMIN — DEXMEDETOMIDINE HYDROCHLORIDE 8 MCG: 200 INJECTION INTRAVENOUS at 11:41

## 2023-02-14 RX ADMIN — PROPOFOL 20 MG: 10 INJECTION, EMULSION INTRAVENOUS at 11:40

## 2023-02-14 RX ADMIN — PROPOFOL 30 MG: 10 INJECTION, EMULSION INTRAVENOUS at 11:45

## 2023-02-14 RX ADMIN — Medication 2 G: at 11:17

## 2023-02-14 RX ADMIN — SODIUM CHLORIDE, POTASSIUM CHLORIDE, SODIUM LACTATE AND CALCIUM CHLORIDE: 600; 310; 30; 20 INJECTION, SOLUTION INTRAVENOUS at 09:06

## 2023-02-14 ASSESSMENT — ACTIVITIES OF DAILY LIVING (ADL)
ADLS_ACUITY_SCORE: 35

## 2023-02-14 ASSESSMENT — LIFESTYLE VARIABLES: TOBACCO_USE: 0

## 2023-02-14 NOTE — INTERVAL H&P NOTE
"I have reviewed the surgical (or preoperative) H&P that is linked to this encounter, and examined the patient. There are no significant changes    Clinical Conditions Present on Arrival:  Clinically Significant Risk Factors Present on Admission                    # Overweight: Estimated body mass index is 25.68 kg/m  as calculated from the following:    Height as of this encounter: 1.88 m (6' 2\").    Weight as of this encounter: 90.7 kg (200 lb).       "

## 2023-02-14 NOTE — ANESTHESIA PREPROCEDURE EVALUATION
Anesthesia Pre-Procedure Evaluation    Patient: Yimi Gibbs   MRN: [id number] : 1965        Procedure : Procedure(s):  Fifth Metatarsal Head Resection Left Foot, Excision of Ulcer with Adjacent Tissue Rearrangement for Closure Left Foot          Past Medical History:   Diagnosis Date     Abductor spasmodic dysphonia      Hyperlipidaemia LDL goal < 100      Hypertension       Past Surgical History:   Procedure Laterality Date     BUNIONECTOMY  2010     SEPTOPLASTY  2007     THYROPLASTY  2001      Allergies   Allergen Reactions     Lisinopril Angioedema     Losartan Angioedema      Social History     Tobacco Use     Smoking status: Never     Smokeless tobacco: Never   Substance Use Topics     Alcohol use: Yes     Comment: socially      Wt Readings from Last 1 Encounters:   23 90.7 kg (200 lb)        Anesthesia Evaluation   Pt has had prior anesthetic. Type: General.    No history of anesthetic complications       ROS/MED HX  ENT/Pulmonary:     (+) vocal cord abnormalities (Abductor spasmodic dysphonia) -   (-) tobacco use   Neurologic:       Cardiovascular:     (+) Dyslipidemia hypertension-----    METS/Exercise Tolerance:     Hematologic:       Musculoskeletal:       GI/Hepatic:       Renal/Genitourinary:       Endo:       Psychiatric/Substance Use:       Infectious Disease:       Malignancy:       Other:               OUTSIDE LABS:  CBC:   Lab Results   Component Value Date    WBC 5.9 2023    WBC 2.7 (A) 2017    HGB 15.1 2023    HGB 15.6 2017    HCT 44.2 2023    HCT 47.4 2017     2023     2017     BMP:   Lab Results   Component Value Date     2023     (L) 10/12/2020    POTASSIUM 5.0 2023    POTASSIUM 5.6 (H) 10/12/2020    CHLORIDE 99 2023    CHLORIDE 95 (L) 10/12/2020    CO2 26 2013    BUN 15 2023    BUN 14 2023    CR 1.07 2023    CR 0.93 10/12/2020    GLC 91 2023    GLC  109 (H) 10/12/2020     COAGS: No results found for: PTT, INR, FIBR  POC: No results found for: BGM, HCG, HCGS  HEPATIC:   Lab Results   Component Value Date    ALBUMIN 4.7 10/12/2020    PROTTOTAL 7.5 10/12/2020     (H) 10/12/2020     (H) 10/12/2020    ALKPHOS 103 10/12/2020    BILITOTAL 0.9 10/12/2020    BILIDIRECT 0.20 10/12/2020     OTHER:   Lab Results   Component Value Date    A1C 5.5 10/12/2020    THANG 9.9 02/09/2023    TSH 2.21 02/07/2013       Anesthesia Plan    ASA Status:  2   NPO Status:  NPO Appropriate    Anesthesia Type: MAC.     - Reason for MAC: straight local not clinically adequate              Consents    Anesthesia Plan(s) and associated risks, benefits, and realistic alternatives discussed. Questions answered and patient/representative(s) expressed understanding.    - Discussed:     - Discussed with:  Patient         Postoperative Care    Pain management: IV analgesics, Oral pain medications, Multi-modal analgesia.   PONV prophylaxis: Ondansetron (or other 5HT-3)     Comments:                King Gross MD

## 2023-02-14 NOTE — ANESTHESIA CARE TRANSFER NOTE
Patient: Yimi Gibbs    Procedure: Procedure(s):  Fifth Metatarsal Head Resection Left Foot, debridment of Ulcer       Diagnosis: Tailor's bunion of left foot [M21.622]  Non-healing ulcer (H) [L98.499]  Diagnosis Additional Information: No value filed.    Anesthesia Type:   MAC     Note:    Oropharynx: oropharynx clear of all foreign objects and spontaneously breathing  Level of Consciousness: awake  Oxygen Supplementation: face mask  Level of Supplemental Oxygen (L/min / FiO2): 6  Independent Airway: airway patency satisfactory and stable  Dentition: dentition unchanged  Vital Signs Stable: post-procedure vital signs reviewed and stable  Report to RN Given: handoff report given  Patient transferred to: PACU    Handoff Report: Identifed the Patient, Identified the Reponsible Provider, Reviewed the pertinent medical history, Discussed the surgical course, Reviewed Intra-OP anesthesia mangement and issues during anesthesia, Set expectations for post-procedure period and Allowed opportunity for questions and acknowledgement of understanding      Vitals:  Vitals Value Taken Time   /76    Temp     Pulse 70 02/14/23 1228   Resp 12 02/14/23 1228   SpO2 94 % 02/14/23 1228   Vitals shown include unvalidated device data.    Electronically Signed By: RU Morrison CRNA  February 14, 2023  12:28 PM

## 2023-02-14 NOTE — DISCHARGE INSTRUCTIONS
Same Day Surgery Discharge Instructions for  Sedation and General Anesthesia     It's not unusual to feel dizzy, light-headed or faint for up to 24 hours after surgery or while taking pain medication.  If you have these symptoms: sit for a few minutes before standing and have someone assist you when you get up to walk or use the bathroom.    You should rest and relax for the next 24 hours. We recommend you make arrangements to have an adult stay with you for at least 24 hours after your discharge.  Avoid hazardous and strenuous activity.    DO NOT DRIVE any vehicle or operate mechanical equipment for 24 hours following the end of your surgery.  Even though you may feel normal, your reactions may be affected by the medication you have received.    Do not drink alcoholic beverages for 24 hours following surgery.     Slowly progress to your regular diet as you feel able. It's not unusual to feel nauseated and/or vomit after receiving anesthesia.  If you develop these symptoms, drink clear liquids (apple juice, ginger ale, broth, 7-up, etc. ) until you feel better.  If your nausea and vomiting persists for 24 hours, please notify your surgeon.      All narcotic pain medications, along with inactivity and anesthesia, can cause constipation. Drinking plenty of liquids and increasing fiber intake will help.    For any questions of a medical nature, call your surgeon.    Do not make important decisions for 24 hours.    If you had general anesthesia, you may have a sore throat for a couple of days related to the breathing tube used during surgery.  You may use Cepacol lozenges to help with this discomfort.  If it worsens or if you develop a fever, contact your surgeon.     If you feel your pain is not well managed with the pain medications prescribed by your surgeon, please contact your surgeon's office to let them know so they can address your concerns.      Reasons to contact your surgeon:    Signs of possible infection:  Check your incision daily for redness, swelling, warmth, red streaks or foul drainage.   Elevated temperature.  Pain not controlled with pain medication and/or rest.   Uncontrolled nausea or vomiting.  Any questions or concerns.

## 2023-02-14 NOTE — OR NURSING
Instructions reviewed with patient and wife. Norco here with patient for discharge. Waiting for orthotics to get here. Patient resting comfortably. CMS intact LLE

## 2023-02-14 NOTE — OP NOTE
Surgeon:  Kerrie Childs DPM    Procedure:   1. Fifth metatarsal head resection left foot  2. Debridement of full thickness ulceration left foot    Pre-op diagnosis:?   1. Tailors bunion left foot  2. Nonhealing ulcer sub 5th metatarsal head left foot.    Post-op diagnosis:?same     Anesthesia:  MAC with local    Hemostasis:  Ankle TQ at 250mmHg    Estimated blood loss: 2cc    Specimen:  none    Indications: This is a 57 years old male presents as a surgical consult from my colleague for a nonhealing ulcer left foot sub 5th met head that has been recurrent and present on and off for the past 6 months. He related a history of Tailors bunionectomy about 10 years ago for the same problem but that didn't take care of the problem. X rays showed prominent 5th metatarsal head.  I discussed that we could excise the metatarsal head to relieve pressure at that area. I discussed possible excision of the ulcer depending on how it looked after I debride it in the OR.  Pt was seen pre-operatively, procedure and post-op course were discussed as well as alternatives, risks and complications. No guarantees were given. All questions were answered to the patient s satisfactions. Pt agrees to comply and opts to proceed with the operation.      Report of operation:      Patient was brought into the operating room, properly identified and placed on the operating room table in a supine position. Following administration of MAC anesthesia, local anesthetic 10cc of 1:1 1% lidocaine and 0.5% marcaine plain was injected subcutaneously forming a modified Carpio block around the 5th metatarsal. The foot was then scrubbed, prepped and draped in the usual sterile and aseptic manner. The left foot was elevated to enxanguinate, and the previously applied well padded supramalleolar tourniquet was inflated to 250mmHg.     Attention was directed to the left lateral dorsal foot where a 4cm longitudinal incision was made on top of the 5th metatarsophalangeal  joint. The incision was deepened in the same plane with care taken to identify and retract all vital neurovascular structures. All superficial bleeders were cauterized as necessary. A linear capsulotomy was made on the top of the joint with care taken to retract the extensor tendon to the 5th toe. At this point, the metatarsal head was free from surrounding soft tissue attachment. Next, a Sagittal saw was used to excise the metatarsal head and it was angulated dorsal distal to plantar proximal. The metatarsal head was removed completely. A rasp was used to smooth down the remaining 5th metatarsal. This area was flushed with copious amount of sterile saline.    Next, attention was directed to the ulceration area locating sub 5th metatarsal head where at this point, the ulcer was debrided using #15 blade down to the subcutaneous tissue area. All fibrotic and callusing tissues were removed. There was significant surrounding hyperkeratotic tissues and this was pared down. Due to the amount of the callus built up, I decided not to excise the ulcer because I wouldn't have good skin flap for closure of the ulcer. The ulcer post-debridement measured 0.5 x 0.4 x 0.2cm and is 100% granular wound base. No signs of infection. At this point, it was noted that the ulcer was successfully offloaded with the removal of the metatarsal head.    The incision site dorsally was closed with 3-0 vicryl for the capsule layer, 3-0 monocryl for the subcutaneous tissue, and 3-0 nylon for the skin.      The wound sites were then dressed with adaptic, 4 x 4, kerlix, and ACE wrap. The tourniquet was then deflated. Adequate perfusion was noted to all distal digits.      The patient tolerated the anesthesia and procedure well, without complications, left the operating room with vital signs stable and vascular status intact to all digits of the left foot. Following a period of post-operative monitoring, will be discharged home after being given both  oral and written postoperative instruction.     I was present for the entire case and performed all aspects of the procedure.

## 2023-02-14 NOTE — ANESTHESIA POSTPROCEDURE EVALUATION
Patient: Yimi Gibbs    Procedure: Procedure(s):  Fifth Metatarsal Head Resection Left Foot, debridment of Ulcer       Anesthesia Type:  MAC    Note:     Postop Pain Control: Uneventful            Sign Out: Well controlled pain   PONV: No   Neuro/Psych: Uneventful            Sign Out: Acceptable/Baseline neuro status   Airway/Respiratory: Uneventful            Sign Out: Acceptable/Baseline resp. status   CV/Hemodynamics: Uneventful            Sign Out: Acceptable CV status   Other NRE: NONE   DID A NON-ROUTINE EVENT OCCUR?            Last vitals:  Vitals Value Taken Time   /96 02/14/23 1245   Temp     Pulse 51 02/14/23 1254   Resp 17 02/14/23 1254   SpO2 98 % 02/14/23 1254   Vitals shown include unvalidated device data.    Electronically Signed By: King Gross MD  February 14, 2023  1:28 PM

## 2023-02-22 DIAGNOSIS — I10 ESSENTIAL HYPERTENSION: ICD-10-CM

## 2023-02-22 RX ORDER — AMLODIPINE BESYLATE 5 MG/1
TABLET ORAL
Qty: 90 TABLET | Refills: 3 | Status: SHIPPED | OUTPATIENT
Start: 2023-02-22 | End: 2024-02-29

## 2023-06-05 ENCOUNTER — OFFICE VISIT (OUTPATIENT)
Dept: FAMILY MEDICINE | Facility: CLINIC | Age: 58
End: 2023-06-05

## 2023-06-05 VITALS
DIASTOLIC BLOOD PRESSURE: 107 MMHG | SYSTOLIC BLOOD PRESSURE: 146 MMHG | OXYGEN SATURATION: 98 % | BODY MASS INDEX: 27.04 KG/M2 | HEIGHT: 73 IN | WEIGHT: 204 LBS | HEART RATE: 91 BPM

## 2023-06-05 DIAGNOSIS — Z00.00 ROUTINE GENERAL MEDICAL EXAMINATION AT A HEALTH CARE FACILITY: Primary | ICD-10-CM

## 2023-06-05 DIAGNOSIS — J45.998 CHRONIC ALLERGIC BRONCHITIS: ICD-10-CM

## 2023-06-05 DIAGNOSIS — E78.5 HYPERLIPIDEMIA WITH TARGET LDL LESS THAN 100: ICD-10-CM

## 2023-06-05 DIAGNOSIS — I10 ESSENTIAL HYPERTENSION: ICD-10-CM

## 2023-06-05 DIAGNOSIS — R21 FACIAL RASH: ICD-10-CM

## 2023-06-05 DIAGNOSIS — Z23 ENCOUNTER FOR IMMUNIZATION: ICD-10-CM

## 2023-06-05 PROCEDURE — 90677 PCV20 VACCINE IM: CPT | Performed by: FAMILY MEDICINE

## 2023-06-05 PROCEDURE — 99396 PREV VISIT EST AGE 40-64: CPT | Performed by: FAMILY MEDICINE

## 2023-06-05 PROCEDURE — 90471 IMMUNIZATION ADMIN: CPT | Mod: 59 | Performed by: FAMILY MEDICINE

## 2023-06-05 PROCEDURE — 99214 OFFICE O/P EST MOD 30 MIN: CPT | Mod: 25 | Performed by: FAMILY MEDICINE

## 2023-06-05 RX ORDER — ROSUVASTATIN CALCIUM 10 MG/1
10 TABLET, COATED ORAL DAILY
Qty: 90 TABLET | Refills: 3 | Status: SHIPPED | OUTPATIENT
Start: 2023-06-05 | End: 2024-05-28

## 2023-06-05 RX ORDER — CHLORTHALIDONE 25 MG/1
25 TABLET ORAL DAILY
Qty: 90 TABLET | Refills: 0 | Status: SHIPPED | OUTPATIENT
Start: 2023-06-05 | End: 2023-09-05

## 2023-06-05 ASSESSMENT — ASTHMA QUESTIONNAIRES: ACT_TOTALSCORE: 24

## 2023-06-05 NOTE — PROGRESS NOTES
3  SUBJECTIVE:   CC: Yimi Gibbs is an 57 year old male who presents for preventive health visit.     Patient has been advised of split billing requirements and indicates understanding: Yes     HTN: on amlodipine 5 mg.  Angioedema with ACEi/ARB.  Home readings also elevated.  No chest pain or SOB.    Asthma: well controlled with qvar.    Facial rash: ongoing about a month.  No new irritants known.    HLD: Last LDL>190.    Healthy Habits:    Do you get at least three servings of calcium containing foods daily (dairy, green leafy vegetables, etc.)? yes    Amount of exercise or daily activities, outside of work: 0 day(s) per week    Problems taking medications regularly No    Medication side effects: No    Have you had an eye exam in the past two years? yes    Do you see a dentist twice per year? yes    Do you have sleep apnea, excessive snoring or daytime drowsiness?no    Hearing Screening:  Right Ear  4000Hz: pass  2000Hz: pass  1000Hz: pass  500Hz: pass    Left Ear  4000Hz: pass  2000Hz: pass  1000Hz: pass  500Hz: pass        -------------------------------------    Today's PHQ-2 Score:       6/5/2023    12:57 PM 2/9/2023     2:12 PM   PHQ-2 ( 1999 [employer])   Q1: Little interest or pleasure in doing things 0 0   Q2: Feeling down, depressed or hopeless 0 0   PHQ-2 Score 0 0     Abuse: Current or Past(Physical, Sexual or Emotional)- No  Do you feel safe in your environment? Yes    Have you ever done Advance Care Planning? (For example, a Health Directive, POLST, or a discussion with a medical provider or your loved ones about your wishes): No, advance care planning information given to patient to review.  Patient plans to discuss their wishes with loved ones or provider.      Social History     Tobacco Use     Smoking status: Never     Smokeless tobacco: Never   Vaping Use     Vaping status: Never Used   Substance Use Topics     Alcohol use: Yes     Comment: socially     If you drink alcohol do you typically  "have >3 drinks per day or >7 drinks per week? No                      Last PSA: No results found for: PSA    Reviewed orders with patient. Reviewed health maintenance and updated orders accordingly - Yes  Lab work is in process    Reviewed and updated as needed this visit by clinical staff   Tobacco  Allergies  Meds  Problems  Med Hx  Surg Hx  Fam Hx          Reviewed and updated as needed this visit by Provider   Tobacco  Allergies  Meds  Problems  Med Hx  Surg Hx  Fam Hx             ROS:  CONSTITUTIONAL: NEGATIVE for fever, chills, change in weight  EYES: NEGATIVE for vision changes or irritation  ENT: NEGATIVE for ear, mouth and throat problems  RESP: NEGATIVE for significant cough or SOB  CV: NEGATIVE for chest pain, palpitations or peripheral edema  GI: NEGATIVE for nausea, abdominal pain, heartburn, or change in bowel habits   male: negative for dysuria, hematuria, decreased urinary stream, erectile dysfunction, urethral discharge  MUSCULOSKELETAL: NEGATIVE for significant arthralgias or myalgia  NEURO: NEGATIVE for weakness, dizziness or paresthesias  PSYCHIATRIC: NEGATIVE for changes in mood or affect    OBJECTIVE:   BP (!) 146/107   Pulse 91   Ht 1.842 m (6' 0.5\")   Wt 92.5 kg (204 lb)   SpO2 98%   BMI 27.29 kg/m    EXAM:  GENERAL: healthy, alert and no distress  EYES: Eyes grossly normal to inspection, PERRL and conjunctivae and sclerae normal  HENT: ear canals and TM's normal, nose and mouth without ulcers or lesions  NECK: no adenopathy, no asymmetry, masses, or scars and thyroid normal to palpation  RESP: lungs clear to auscultation - no rales, rhonchi or wheezes  CV: regular rate and rhythm, normal S1 S2, no S3 or S4, no murmur, click or rub, no peripheral edema and peripheral pulses strong  ABDOMEN: soft, nontender, no hepatosplenomegaly, no masses and bowel sounds normal  RECTAL: normal sphincter tone, no rectal masses, prostate normal size, smooth, nontender without nodules or " "masses  MS: no gross musculoskeletal defects noted, no edema  SKIN: numerous red papules on face diffusely.  Fleshy pedunculated papule left axilla  NEURO: Normal strength and tone, mentation intact and speech normal  PSYCH: mentation appears normal, affect normal/bright    Diagnostic Test Results:  Labs reviewed in Epic  No results found for this or any previous visit (from the past 24 hour(s)).    ASSESSMENT/PLAN:   Routine general medical examination at a health care facility  - discussed preventative guidelines, healthy diet, exercise and weight management    Essential hypertension  Above goal.  Add CTD and recheck in 2 weeks with BMP  - chlorthalidone (HYGROTON) 25 MG tablet; Take 1 tablet (25 mg) by mouth daily    Hyperlipidemia with target LDL less than 100  LDL > 190, start statin.  Proper use and potential benefits, harms and side effects discussed in detail.  - rosuvastatin (CRESTOR) 10 MG tablet; Take 1 tablet (10 mg) by mouth daily    Chronic allergic bronchitis  stable/controlled. Cont current medication(s) and treatment  - Asthma Action Plan (AAP); Future    Encounter for immunization  - VACCINE ADMINISTRATION, INITIAL    Facial rash  Rosacea like appearance but broader distribution.  Referral for further eval.  - Adult Dermatology Referral; Future      Patient has been advised of split billing requirements and indicates understanding: Yes  COUNSELING:  Reviewed preventive health counseling, as reflected in patient instructions       Regular exercise       Healthy diet/nutrition       Colorectal cancer screening       Prostate cancer screening    Estimated body mass index is 27.29 kg/m  as calculated from the following:    Height as of this encounter: 1.842 m (6' 0.5\").    Weight as of this encounter: 92.5 kg (204 lb).    Weight management plan: Discussed healthy diet and exercise guidelines    He reports that he has never smoked. He has never used smokeless tobacco.      Counseling Resources:  ATP IV " Guidelines  Pooled Cohorts Equation Calculator  FRAX Risk Assessment  ICSI Preventive Guidelines  Dietary Guidelines for Americans, 2010  USDA's MyPlate  ASA Prophylaxis  Lung CA Screening    Doug Subramanian MD  [facility]

## 2023-06-05 NOTE — PATIENT INSTRUCTIONS
My Asthma Action Plan    Name: Yimi Gibbs   YOB: 1965  Date: 6/5/2023   My doctor: Doug Subramanian MD   My clinic: [facility]        My Control Medicine: Beclomethasone dipropionate (Qvar Redihaler) -  40 mcg BID  My Rescue Medicine: Albuterol (Proair/Ventolin/Proventil HFA) 2-4 puffs EVERY 4 HOURS as needed. Use a spacer if recommended by your provider.  My Oral Steroid Medicine: prednisone My Asthma Severity:   Mild Persistent  Know your asthma triggers:  allergies               GREEN ZONE   Good Control  I feel good  No cough or wheeze  Can work, sleep and play without asthma symptoms       Take your asthma control medicine every day.     If exercise triggers your asthma, take your rescue medication  15 minutes before exercise or sports, and  During exercise if you have asthma symptoms  Spacer to use with inhaler: If you have a spacer, make sure to use it with your inhaler             YELLOW ZONE Getting Worse  I have ANY of these:  I do not feel good  Cough or wheeze  Chest feels tight  Wake up at night   Keep taking your Green Zone medications  Start taking your rescue medicine:  every 20 minutes for up to 1 hour. Then every 4 hours for 24-48 hours.  If you stay in the Yellow Zone for more than 12-24 hours, contact your doctor.  If you do not return to the Green Zone in 12-24 hours or you get worse, start taking your oral steroid medicine if prescribed by your provider.           RED ZONE Medical Alert - Get Help  I have ANY of these:  I feel awful  Medicine is not helping  Breathing getting harder  Trouble walking or talking  Nose opens wide to breathe       Take your rescue medicine NOW  If your provider has prescribed an oral steroid medicine, start taking it NOW  Call your doctor NOW  If you are still in the Red Zone after 20 minutes and you have not reached your doctor:  Take your rescue medicine again and  Call 911 or go to the emergency room right away    See your  regular doctor within 2 weeks of an Emergency Room or Urgent Care visit for follow-up treatment.          Annual Reminders:  Meet with Asthma Educator,  Flu Shot in the Fall, consider Pneumonia Vaccination for patients with asthma (aged 19 and older).    Pharmacy: [id number] [address]    Electronically signed by Doug Subramanian MD   Date: 06/05/23                      Asthma Triggers  How To Control Things That Make Your Asthma Worse    Triggers are things that make your asthma worse.  Look at the list below to help you find your triggers and what you can do about them.  You can help prevent asthma flare-ups by staying away from your triggers.      Trigger                                                          What you can do   Cigarette Smoke  Tobacco smoke can make asthma worse. Do not allow smoking in your home, car or around you.  Be sure no one smokes at a child s day care or school.  If you smoke, ask your health care provider for ways to help you quit.  Ask family members to quit too.  Ask your health care provider for a referral to Quit Plan to help you quit smoking, or call 1-033-049-PLAN.     Colds, Flu, Bronchitis  These are common triggers of asthma. Wash your hands often.  Don t touch your eyes, nose or mouth.  Get a flu shot every year.     Dust Mites  These are tiny bugs that live in cloth or carpet. They are too small to see. Wash sheets and blankets in hot water every week.   Encase pillows and mattress in dust mite proof covers.  Avoid having carpet if you can. If you have carpet, vacuum weekly.   Use a dust mask and HEPA vacuum.   Pollen and Outdoor Mold  Some people are allergic to trees, grass, or weed pollen, or molds. Try to keep your windows closed.  Limit time out doors when pollen count is high.   Ask you health care provider about taking medicine during allergy season.     Animal Dander  Some people are allergic to skin flakes, urine or  saliva from pets with fur or feathers. Keep pets with fur or feathers out of your home.    If you can t keep the pet outdoors, then keep the pet out of your bedroom.  Keep the bedroom door closed.  Keep pets off cloth furniture and away from stuffed toys.     Mice, Rats, and Cockroaches   Some people are allergic to the waste from these pests.   Cover food and garbage.  Clean up spills and food crumbs.  Store grease in the refrigerator.   Keep food out of the bedroom.   Indoor Mold  This can be a trigger if your home has high moisture. Fix leaking faucets, pipes, or other sources of water.   Clean moldy surfaces.  Dehumidify basement if it is damp and smelly.   Smoke, Strong Odors, and Sprays  These can reduce air quality. Stay away from strong odors and sprays, such as perfume, powder, hair spray, paints, smoke incense, paint, cleaning products, candles and new carpet.   Exercise or Sports  Some people with asthma have this trigger. Be active!  Ask your doctor about taking medicine before sports or exercise to prevent symptoms.    Warm up for 5-10 minutes before and after sports or exercise.     Other Triggers of Asthma  Cold air:  Cover your nose and mouth with a scarf.  Sometimes laughing or crying can be a trigger.  Some medicines and food can trigger asthma.       Preventive Health Recommendations  Male Ages 50 - 64    Yearly exam:             See your health care provider every year in order to  o   Review health changes.   o   Discuss preventive care.    o   Review your medicines if your doctor has prescribed any.   Have a cholesterol test every 5 years, or more frequently if you are at risk for high cholesterol/heart disease.   Have a diabetes test (fasting glucose) every three years. If you are at risk for diabetes, you should have this test more often.   Have a colonoscopy at age 50, or have a yearly FIT test (stool test). These exams will check for colon cancer.    Talk with your health care provider  about whether or not a prostate cancer screening test (PSA) is right for you.  You should be tested each year for STDs (sexually transmitted diseases), if you re at risk.     Shots: Get a flu shot each year. Get a tetanus shot every 10 years.     Nutrition:  Eat at least 5 servings of fruits and vegetables daily.   Eat whole-grain bread, whole-wheat pasta and brown rice instead of white grains and rice.   Get adequate Calcium and Vitamin D.     Lifestyle  Exercise for at least 150 minutes a week (30 minutes a day, 5 days a week). This will help you control your weight and prevent disease.   Limit alcohol to one drink per day.   No smoking.   Wear sunscreen to prevent skin cancer.   See your dentist every six months for an exam and cleaning.   See your eye doctor every 1 to 2 years.

## 2023-07-14 ENCOUNTER — OFFICE VISIT (OUTPATIENT)
Dept: FAMILY MEDICINE | Facility: CLINIC | Age: 58
End: 2023-07-14

## 2023-07-14 VITALS
SYSTOLIC BLOOD PRESSURE: 138 MMHG | BODY MASS INDEX: 26.43 KG/M2 | OXYGEN SATURATION: 96 % | DIASTOLIC BLOOD PRESSURE: 82 MMHG | HEART RATE: 91 BPM | WEIGHT: 197.6 LBS

## 2023-07-14 DIAGNOSIS — I10 ESSENTIAL HYPERTENSION: Primary | ICD-10-CM

## 2023-07-14 PROCEDURE — 36415 COLL VENOUS BLD VENIPUNCTURE: CPT | Performed by: FAMILY MEDICINE

## 2023-07-14 PROCEDURE — 99213 OFFICE O/P EST LOW 20 MIN: CPT | Performed by: FAMILY MEDICINE

## 2023-07-14 NOTE — PROGRESS NOTES
Michelle Geller is a 58 year old patient who presents to clinic for follow up of HTN.  on amlodipine 5 mg.  Angioedema with ACEi/ARB. Added Chlorthalidone last visit. No side effects        Review of Systems   Constitutional, HEENT, cardiovascular, pulmonary, GI, , musculoskeletal, neuro, skin, endocrine and psych systems are negative, except as otherwise noted.      Objective    /82   Pulse 91   Wt 89.6 kg (197 lb 9.6 oz)   SpO2 96%   BMI 26.43 kg/m      General: Well appearing, NAD  Psych: normal mood and affect        No results found for this or any previous visit (from the past 24 hour(s)).    Essential hypertension  Improved, cont CTD and amlodipine.  Cont to monitor at home and follow up if elevated and would increase amlodipine.  - Basic Metabolic Panel (8) ([facility])

## 2023-07-15 LAB
BUN SERPL-MCNC: 19 MG/DL (ref 6–24)
BUN/CREATININE RATIO: 16 (ref 9–20)
CALCIUM SERPL-MCNC: 9.9 MG/DL (ref 8.7–10.2)
CHLORIDE SERPLBLD-SCNC: 91 MMOL/L (ref 96–106)
CREAT SERPL-MCNC: 1.2 MG/DL (ref 0.76–1.27)
EGFR: 71 ML/MIN/1.73
GLUCOSE SERPL-MCNC: 134 MG/DL (ref 70–99)
POTASSIUM SERPL-SCNC: 3.6 MMOL/L (ref 3.5–5.2)
SODIUM SERPL-SCNC: 133 MMOL/L (ref 134–144)
TOTAL CO2: 25 MMOL/L (ref 20–29)

## 2023-08-17 NOTE — PROGRESS NOTES
Subjective     Yimi is a 55 year old patient who presents to clinic for evaluation.  Reports rapid patchy hair loss over the past few weeks.  Denies stressful events, severe illness, or systemic symptoms.  He feels well otherwise.  No know history of thyroid disease or other autoimmune disorders.  Denies pain or swelling.        Review of Systems   Constitutional, HEENT, cardiovascular, pulmonary, GI, , musculoskeletal, neuro, skin, endocrine and psych systems are negative, except as otherwise noted.      Objective    BP (!) 162/96   Pulse 68   Wt 93 kg (205 lb)   SpO2 98%   BMI 27.05 kg/m      General: Well appearing, NAD  Skin: there is diffuse thinning of the hair and receding hairline.  Within this background, there are several well circumscribed oval areas of patchy hair loss.  The skin is normal and non-scarring and numerous velus hairs are visualized.    Psych: normal mood and affect      Assessment & Plan     Alopecia areata  Clinically consistent with alopecia areata.  Discussed treatment with intralesional corticosteroids vs referral to dermatology for more definitive diagnosis and treatment.  He elected the latter.  Referral placed.  In the meantime, recommend 5% minoxidil.  The natural history of this was discussed.  - ADULT DERMATOLOGY REFERRAL; Future  - TSH reflex to T4 ([facility])  - VENOUS COLLECTION      35 minutes spent on the date of the encounter doing chart review, history and exam, documentation and further activities per the note  6}     Doug Subramanian MD  [facility]          
5

## 2023-09-05 DIAGNOSIS — I10 ESSENTIAL HYPERTENSION: ICD-10-CM

## 2023-09-05 RX ORDER — CHLORTHALIDONE 25 MG/1
25 TABLET ORAL DAILY
Qty: 90 TABLET | Refills: 1 | Status: SHIPPED | OUTPATIENT
Start: 2023-09-05 | End: 2024-02-29

## 2023-09-05 NOTE — CONFIDENTIAL NOTE
Med: CHLORTHALIDONE    LOV (related): 7/14/23    Last Lab: 7/14/23      Due for F/U around: N/A    Next Appt: NONE        BP Readings from Last 3 Encounters:   07/14/23 138/82   06/05/23 (!) 146/107   02/14/23 (!) 141/86       Last Comprehensive Metabolic Panel:  Lab Results   Component Value Date     (L) 07/14/2023    POTASSIUM 3.6 07/14/2023    CHLORIDE 91 (L) 07/14/2023    CO2 26 02/07/2013    ANIONGAP 14 02/07/2013     (H) 07/14/2023    BUN 19 07/14/2023    BUN 16 07/14/2023    CR 1.20 07/14/2023    GFRESTIMATED >90 02/07/2013    THANG 9.9 07/14/2023

## 2023-11-22 DIAGNOSIS — J45.998 CHRONIC ALLERGIC BRONCHITIS: ICD-10-CM

## 2023-11-24 RX ORDER — BECLOMETHASONE DIPROPIONATE HFA 40 UG/1
1 AEROSOL, METERED RESPIRATORY (INHALATION)
Qty: 31.8 G | Refills: 0 | Status: SHIPPED | OUTPATIENT
Start: 2023-11-24

## 2024-02-29 DIAGNOSIS — I10 ESSENTIAL HYPERTENSION: ICD-10-CM

## 2024-02-29 RX ORDER — CHLORTHALIDONE 25 MG/1
25 TABLET ORAL DAILY
Qty: 90 TABLET | Refills: 1 | Status: SHIPPED | OUTPATIENT
Start: 2024-02-29

## 2024-02-29 RX ORDER — AMLODIPINE BESYLATE 5 MG/1
5 TABLET ORAL DAILY
Qty: 90 TABLET | Refills: 3 | Status: SHIPPED | OUTPATIENT
Start: 2024-02-29

## 2024-02-29 NOTE — TELEPHONE ENCOUNTER
Med: Chlorthalidone       LOV (related): 7/14/23    Last Lab: 7/14/23      Due for F/U around: 6 months (1/14/24)     Next Appt: None           BP Readings from Last 3 Encounters:   07/14/23 138/82   06/05/23 (!) 146/107   02/14/23 (!) 141/86       Last Comprehensive Metabolic Panel:  Lab Results   Component Value Date     (L) 07/14/2023    POTASSIUM 3.6 07/14/2023    CHLORIDE 91 (L) 07/14/2023    CO2 26 02/07/2013    ANIONGAP 14 02/07/2013     (H) 07/14/2023    BUN 19 07/14/2023    BUN 16 07/14/2023    CR 1.20 07/14/2023    GFRESTIMATED >90 02/07/2013    THANG 9.9 07/14/2023

## 2024-05-03 NOTE — TELEPHONE ENCOUNTER
----- Message from RU Richard CNP sent at 10/13/2020 10:38 AM CDT -----  Please call patient with results.    Labs appear to possibly be hemolyzed and incorrect. I would like him to drink plenty of water and have rechecked later this week or early next week. Also needs to schedule RUQ abdominal ultrasound.    RU Lang CNP on 10/13/2020 at 10:38 AM    
Called both numbers again and left messages for patient to call back.   
musculoskeletal

## 2024-05-28 DIAGNOSIS — E78.5 HYPERLIPIDEMIA WITH TARGET LDL LESS THAN 100: ICD-10-CM

## 2024-05-28 RX ORDER — ROSUVASTATIN CALCIUM 10 MG/1
10 TABLET, COATED ORAL DAILY
Qty: 90 TABLET | Refills: 0 | Status: SHIPPED | OUTPATIENT
Start: 2024-05-28 | End: 2024-09-20

## 2024-05-28 NOTE — TELEPHONE ENCOUNTER
"Med: Rosuvastatin    LOV (related): 7/14/23    Last Lab: 2/09/23      Due for F/U around: N/A    Next Appt: 6/11/24        Cholesterol   Date Value Ref Range Status   02/09/2023 245 (A) 100 - 199 mg/dl Final   09/10/2020 214 (H) 100 - 199 mg/dL Final   05/15/2019 214 (H) 100 - 199 mg/dL Final     HDL Cholesterol   Date Value Ref Range Status   09/10/2020 41 >39 mg/dL Final   05/15/2019 53 >39 mg/dL Final     HDL   Date Value Ref Range Status   02/09/2023 35 (A) 40 mg/dl Final     LDL Cholesterol Calculated   Date Value Ref Range Status   09/10/2020 154 (H) 0 - 99 mg/dL Final   05/15/2019 144 (H) 0 - 99 mg/dL Final     LDL CALCULATED ([facility])   Date Value Ref Range Status   02/09/2023 193 (A) 0 - 130 mg/dl Final     Triglycerides   Date Value Ref Range Status   02/09/2023 83 0 - 149 mg/dl Final   09/10/2020 105 0 - 149 mg/dL Final   05/15/2019 86 0 - 149 mg/dL Final     No results found for: \"CHOLHDLRATIO\"    "

## 2024-06-10 SDOH — HEALTH STABILITY: PHYSICAL HEALTH: ON AVERAGE, HOW MANY MINUTES DO YOU ENGAGE IN EXERCISE AT THIS LEVEL?: 60 MIN

## 2024-06-10 SDOH — HEALTH STABILITY: PHYSICAL HEALTH: ON AVERAGE, HOW MANY DAYS PER WEEK DO YOU ENGAGE IN MODERATE TO STRENUOUS EXERCISE (LIKE A BRISK WALK)?: 4 DAYS

## 2024-06-10 ASSESSMENT — SOCIAL DETERMINANTS OF HEALTH (SDOH): HOW OFTEN DO YOU GET TOGETHER WITH FRIENDS OR RELATIVES?: ONCE A WEEK

## 2024-06-11 ENCOUNTER — OFFICE VISIT (OUTPATIENT)
Dept: FAMILY MEDICINE | Facility: CLINIC | Age: 59
End: 2024-06-11

## 2024-06-11 VITALS
HEIGHT: 74 IN | SYSTOLIC BLOOD PRESSURE: 126 MMHG | BODY MASS INDEX: 24.31 KG/M2 | WEIGHT: 189.4 LBS | HEART RATE: 103 BPM | OXYGEN SATURATION: 99 % | DIASTOLIC BLOOD PRESSURE: 86 MMHG

## 2024-06-11 DIAGNOSIS — I10 ESSENTIAL HYPERTENSION: ICD-10-CM

## 2024-06-11 DIAGNOSIS — E78.5 DYSLIPIDEMIA: ICD-10-CM

## 2024-06-11 DIAGNOSIS — Z00.00 ROUTINE GENERAL MEDICAL EXAMINATION AT A HEALTH CARE FACILITY: Primary | ICD-10-CM

## 2024-06-11 DIAGNOSIS — R74.01 TRANSAMINITIS: ICD-10-CM

## 2024-06-11 DIAGNOSIS — R79.89 ELEVATED FERRITIN: ICD-10-CM

## 2024-06-11 DIAGNOSIS — J38.3 DYSPHONIA, SPASMODIC: ICD-10-CM

## 2024-06-11 DIAGNOSIS — L63.9 ALOPECIA AREATA: ICD-10-CM

## 2024-06-11 DIAGNOSIS — J45.998 CHRONIC ALLERGIC BRONCHITIS: ICD-10-CM

## 2024-06-11 LAB
ALBUMIN SERPL BCG-MCNC: 4.7 G/DL (ref 3.5–5.2)
ALP SERPL-CCNC: 91 U/L (ref 40–150)
ALT SERPL W P-5'-P-CCNC: 125 U/L (ref 0–70)
ANION GAP SERPL CALCULATED.3IONS-SCNC: 12 MMOL/L (ref 7–15)
APO A-I SERPL-MCNC: <6 MG/DL
AST SERPL W P-5'-P-CCNC: 107 U/L (ref 0–45)
BILIRUB SERPL-MCNC: 1.9 MG/DL
BUN SERPL-MCNC: 12.5 MG/DL (ref 6–20)
CALCIUM SERPL-MCNC: 9.3 MG/DL (ref 8.6–10)
CHLORIDE SERPL-SCNC: 91 MMOL/L (ref 98–107)
CHOLESTEROL: 133 MG/DL (ref 100–199)
CREAT SERPL-MCNC: 0.76 MG/DL (ref 0.67–1.17)
DEPRECATED HCO3 PLAS-SCNC: 30 MMOL/L (ref 22–29)
EGFRCR SERPLBLD CKD-EPI 2021: >90 ML/MIN/1.73M2
FASTING STATUS PATIENT QL REPORTED: YES
FASTING?: YES
GLUCOSE SERPL-MCNC: 93 MG/DL (ref 70–99)
HDL (RMG): 38 MG/DL (ref 40–?)
LDL CALCULATED (RMG): 85 MG/DL (ref 0–130)
POTASSIUM SERPL-SCNC: 3.5 MMOL/L (ref 3.4–5.3)
PROT SERPL-MCNC: 8.3 G/DL (ref 6.4–8.3)
SODIUM SERPL-SCNC: 133 MMOL/L (ref 135–145)
TRIGLYCERIDES (RMG): 51 MG/DL (ref 0–149)

## 2024-06-11 PROCEDURE — 36415 COLL VENOUS BLD VENIPUNCTURE: CPT | Performed by: FAMILY MEDICINE

## 2024-06-11 PROCEDURE — 83695 ASSAY OF LIPOPROTEIN(A): CPT | Performed by: FAMILY MEDICINE

## 2024-06-11 PROCEDURE — 82728 ASSAY OF FERRITIN: CPT | Performed by: FAMILY MEDICINE

## 2024-06-11 PROCEDURE — 99396 PREV VISIT EST AGE 40-64: CPT | Performed by: FAMILY MEDICINE

## 2024-06-11 PROCEDURE — 99213 OFFICE O/P EST LOW 20 MIN: CPT | Mod: 25 | Performed by: FAMILY MEDICINE

## 2024-06-11 PROCEDURE — 87340 HEPATITIS B SURFACE AG IA: CPT | Performed by: FAMILY MEDICINE

## 2024-06-11 PROCEDURE — 86803 HEPATITIS C AB TEST: CPT | Performed by: FAMILY MEDICINE

## 2024-06-11 PROCEDURE — 80061 LIPID PANEL: CPT | Mod: QW | Performed by: FAMILY MEDICINE

## 2024-06-11 PROCEDURE — 83540 ASSAY OF IRON: CPT | Performed by: FAMILY MEDICINE

## 2024-06-11 PROCEDURE — 80053 COMPREHEN METABOLIC PANEL: CPT | Performed by: FAMILY MEDICINE

## 2024-06-11 PROCEDURE — 83550 IRON BINDING TEST: CPT | Performed by: FAMILY MEDICINE

## 2024-06-11 RX ORDER — CETIRIZINE HYDROCHLORIDE 10 MG/1
10 TABLET ORAL DAILY
COMMUNITY

## 2024-06-11 NOTE — PROGRESS NOTES
Preventive Care Visit  [facility]  Doug Subramanian MD, Family Medicine  Jun 11, 2024      Assessment & Plan     Routine general medical examination at a health care facility  - discussed preventative guidelines, healthy diet, exercise and weight management    Essential hypertension  stable/controlled. Cont current medication(s) and treatment  - Comprehensive metabolic panel; Future  - Comprehensive metabolic panel    Chronic allergic bronchitis  Improved with optimal allergy medication    Dyslipidemia  Recheck, cont statin  - Lipid Profile ([facility])  - VENOUS COLLECTION  - Lipoprotein (a); Future  - Lipoprotein (a)    Alopecia areata  Recurrence.  Will return for kenalog injections which were successful in the past.      Dysphonia, spasmodic  stable    Patient has been advised of split billing requirements and indicates understanding: Yes        Counseling  Appropriate preventive services were discussed with this patient, including applicable screening as appropriate for fall prevention, nutrition, physical activity, Tobacco-use cessation, weight loss and cognition.  Checklist reviewing preventive services available has been given to the patient.  Reviewed patient's diet, addressing concerns and/or questions.       See Patient Instructions    Return in about 53 weeks (around 6/17/2025) for Annual Wellness Visit.    Michelle Geller is a 58 year old, presenting for the following:  Physical (Fasting. No concerns  ), Hearing Screening (Pass bilat ), and Allergies (Terrible this year. Taking zyrtec. Saw allergist in 20s)         Health Care Directive  Patient does not have a Health Care Directive or Living Will: Discussed advance care planning with patient; information given to patient to review.    HPI    HTN: on amlodipine and chlorthalidone.  Doing well    Allergic bronchitis/asthma: doing well.  Has not needed qvar or albuterol lately.    HLD: on rosuvastatin for primary prevention    Alopecia areata:  "has recurred    Spasmodic dysphonia: chronic, stable            6/10/2024   General Health   How would you rate your overall physical health? Good   Feel stress (tense, anxious, or unable to sleep) Not at all     Hearing Screening:  Right Ear  4000Hz: pass  2000Hz: pass  1000Hz: pass  500Hz: pass    Left Ear  4000Hz: pass  2000Hz: pass  1000Hz: pass  500Hz: pass         6/10/2024   Nutrition   Three or more servings of calcium each day? Yes   Diet: Regular (no restrictions)   How many servings of fruit and vegetables per day? (!) 0-1   How many sweetened beverages each day? 0-1         6/10/2024   Exercise   Days per week of moderate/strenous exercise 4 days   Average minutes spent exercising at this level 60 min         6/10/2024   Social Factors   Frequency of gathering with friends or relatives Once a week   Worry food won't last until get money to buy more No   Food not last or not have enough money for food? No   Do you have housing?  Yes   Are you worried about losing your housing? No   Lack of transportation? No   Unable to get utilities (heat,electricity)? Yes   Want help with housing or utility concern? No   (!) FINANCIAL RESOURCE STRAIN CONCERN        6/10/2024   Fall Risk   Fallen 2 or more times in the past year? No   Trouble with walking or balance? No          6/10/2024   Dental   Dentist two times every year? Yes         6/10/2024   TB Screening   Were you born outside of the [place]? No           6/10/2024   Substance Use   Alcohol more than 3/day or more than 7/wk No   Do you use any other substances recreationally? No     Social History     Tobacco Use    Smoking status: Never    Smokeless tobacco: Never   Vaping Use    Vaping status: Never Used   Substance Use Topics    Alcohol use: Yes     Comment: socially    Drug use: No           6/10/2024   STI Screening   New sexual partner(s) since last STI/HIV test? No   Last PSA: No results found for: \"PSA\"  ASCVD Risk   The 10-year ASCVD risk score " "(Rogers AL, et al., 2019) is: 6.2%    Values used to calculate the score:      Age: 58 years      Sex: Male      Is Non- : No      Diabetic: No      Tobacco smoker: No      Systolic Blood Pressure: 126 mmHg      Is BP treated: Yes      HDL Cholesterol: 38 mg/dL      Total Cholesterol: 133 mg/dL           Reviewed and updated as needed this visit by Provider                    Lab work is in process      Review of Systems  Constitutional, HEENT, cardiovascular, pulmonary, GI, , musculoskeletal, neuro, skin, endocrine and psych systems are negative, except as otherwise noted.     Objective    Exam  /86   Pulse 103   Ht 1.867 m (6' 1.5\")   Wt 85.9 kg (189 lb 6.4 oz)   SpO2 99%   BMI 24.65 kg/m     Estimated body mass index is 24.65 kg/m  as calculated from the following:    Height as of this encounter: 1.867 m (6' 1.5\").    Weight as of this encounter: 85.9 kg (189 lb 6.4 oz).    Physical Exam  GENERAL: alert and no distress  EYES: Eyes grossly normal to inspection, PERRL and conjunctivae and sclerae normal  HENT: ear canals and TM's normal, nose and mouth without ulcers or lesions  NECK: no adenopathy, no asymmetry, masses, or scars  RESP: lungs clear to auscultation - no rales, rhonchi or wheezes  CV: regular rate and rhythm, normal S1 S2, no S3 or S4, no murmur, click or rub, no peripheral edema  ABDOMEN: soft, nontender, no hepatosplenomegaly, no masses and bowel sounds normal  RECTAL: normal sphincter tone, no rectal masses, prostate normal size, smooth, nontender without nodules or masses  MS: no gross musculoskeletal defects noted, no edema  SKIN: no suspicious lesions or rashes  NEURO: Normal strength and tone, mentation intact and speech normal  PSYCH: mentation appears normal, affect normal/bright        Signed Electronically by: Doug Subramanian MD    "

## 2024-06-11 NOTE — PATIENT INSTRUCTIONS
"Preventive Care Advice   This is general advice we often give to help people stay healthy. Your care team may have specific advice just for you. Please talk to your care team about your own preventive care needs.  Lifestyle  Exercise at least 150 minutes each week (30 minutes a day, 5 days a week).  Do muscle strengthening activities 2 days a week. These help control your weight and prevent disease.  No smoking.  Wear sunscreen to prevent skin cancer.  Have your home tested for radon every 2 to 5 years. Radon is a colorless, odorless gas that can harm your lungs. To learn more, go to [email or web address]. and search for \"Radon in Homes.\"  Keep guns unloaded and locked up in a safe place like a safe or gun vault, or, use a gun lock and hide the keys. Always lock away bullets separately. To learn more, visit [employer].gov and search for \"safe gun storage.\"  Nutrition  Eat 5 or more servings of fruits and vegetables each day.  Try wheat bread, brown rice and whole grain pasta (instead of white bread, rice, and pasta).  Get enough calcium and vitamin D. Check the label on foods and aim for 100% of the RDA (recommended daily allowance).  Regular exams  Have a dental exam and cleaning every 6 months.  See your health care team every year to talk about:  Any changes in your health.  Any medicines your care team has prescribed.  Preventive care, family planning, and ways to prevent chronic diseases.  Shots (vaccines)   HPV shots (up to age 26), if you've never had them before.  Hepatitis B shots (up to age 59), if you've never had them before.  COVID-19 shot: Get this shot when it's due.  Flu shot: Get a flu shot every year.  Tetanus shot: Get a tetanus shot every 10 years.  Pneumococcal, hepatitis A, and RSV shots: Ask your care team if you need these based on your risk.  Shingles shot (for age 50 and up).  General health tests  Diabetes screening:  Starting at age 35, Get screened for diabetes at least every 3 years.  If " you are younger than age 35, ask your care team if you should be screened for diabetes.  Cholesterol test: At age 39, start having a cholesterol test every 5 years, or more often if advised.  Bone density scan (DEXA): At age 50, ask your care team if you should have this scan for osteoporosis (brittle bones).  Hepatitis C: Get tested at least once in your life.  Abdominal aortic aneurysm screening: Talk to your doctor about having this screening if you:  Have ever smoked; and  Are biologically male; and  Are between the ages of 65 and 75.  STIs (sexually transmitted infections)  Before age 24: Ask your care team if you should be screened for STIs.  After age 24: Get screened for STIs if you're at risk. You are at risk for STIs (including HIV) if:  You are sexually active with more than one person.  You don't use condoms every time.  You or a partner was diagnosed with a sexually transmitted infection.  If you are at risk for HIV, ask about PrEP medicine to prevent HIV.  Get tested for HIV at least once in your life, whether you are at risk for HIV or not.  Cancer screening tests  Cervical cancer screening: If you have a cervix, begin getting regular cervical cancer screening tests at age 21. Most people who have regular screenings with normal results can stop after age 65. Talk about this with your provider.  Breast cancer scan (mammogram): If you've ever had breasts, begin having regular mammograms starting at age 40. This is a scan to check for breast cancer.  Colon cancer screening: It is important to start screening for colon cancer at age 45.  Have a colonoscopy test every 10 years (or more often if you're at risk) Or, ask your provider about stool tests like a FIT test every year or Cologuard test every 3 years.  To learn more about your testing options, visit: www.[employer]/456115.pdf.  For help making a decision, visit: bishop/tp54860.  Prostate cancer screening test: If you have a prostate and are age 55  to 69, ask your provider if you would benefit from a yearly prostate cancer screening test.  Lung cancer screening: If you are a current or former smoker age 50 to 80, ask your care team if ongoing lung cancer screenings are right for you.  For informational purposes only. Not to replace the advice of your health care provider. Copyright   2023 [employer]. All rights reserved. Clinically reviewed by the [facility]. [employer] 013139 - REV 04/24.

## 2024-06-12 LAB
IRON BINDING CAPACITY (ROCHE): 276 UG/DL (ref 240–430)
IRON SATN MFR SERPL: 61 % (ref 15–46)
IRON SERPL-MCNC: 168 UG/DL (ref 61–157)

## 2024-06-13 DIAGNOSIS — R79.89 ELEVATED FERRITIN: ICD-10-CM

## 2024-06-13 DIAGNOSIS — R74.01 TRANSAMINITIS: Primary | ICD-10-CM

## 2024-06-13 LAB
FERRITIN SERPL-MCNC: 1339 NG/ML (ref 31–409)
HBV SURFACE AG SERPL QL IA: NONREACTIVE
HCV AB SERPL QL IA: NONREACTIVE

## 2024-06-14 DIAGNOSIS — R79.89 ELEVATED FERRITIN: ICD-10-CM

## 2024-06-14 DIAGNOSIS — R74.01 TRANSAMINITIS: ICD-10-CM

## 2024-06-14 LAB
HBV CORE AB SERPL QL IA: NONREACTIVE
HBV CORE IGM SERPL QL IA: NONREACTIVE
HBV SURFACE AB SERPL IA-ACNC: <3.5 M[IU]/ML
HBV SURFACE AB SERPL IA-ACNC: NONREACTIVE M[IU]/ML
LAB DIRECTOR COMMENTS: NORMAL
LAB DIRECTOR DISCLAIMER: NORMAL
LAB DIRECTOR INTERPRETATION: NORMAL
LAB DIRECTOR METHODOLOGY: NORMAL
LAB DIRECTOR RESULTS: NORMAL
LOCATION OF TASK: NORMAL
SPECIMEN DESCRIPTION: NORMAL

## 2024-06-14 PROCEDURE — 82103 ALPHA-1-ANTITRYPSIN TOTAL: CPT

## 2024-06-14 PROCEDURE — 81256 HFE GENE: CPT

## 2024-06-14 PROCEDURE — G0452 MOLECULAR PATHOLOGY INTERPR: HCPCS | Mod: 90 | Performed by: PATHOLOGY

## 2024-06-14 PROCEDURE — 36415 COLL VENOUS BLD VENIPUNCTURE: CPT

## 2024-06-14 PROCEDURE — 86705 HEP B CORE ANTIBODY IGM: CPT

## 2024-06-14 PROCEDURE — 86704 HEP B CORE ANTIBODY TOTAL: CPT

## 2024-06-14 PROCEDURE — 82104 ALPHA-1-ANTITRYPSIN PHENO: CPT

## 2024-06-14 PROCEDURE — 86706 HEP B SURFACE ANTIBODY: CPT

## 2024-06-17 LAB
A1AT PHENOTYP SERPL-IMP: NORMAL
A1AT SERPL-MCNC: 161 MG/DL

## 2024-06-24 ENCOUNTER — OFFICE VISIT (OUTPATIENT)
Dept: FAMILY MEDICINE | Facility: CLINIC | Age: 59
End: 2024-06-24

## 2024-06-24 VITALS
SYSTOLIC BLOOD PRESSURE: 128 MMHG | DIASTOLIC BLOOD PRESSURE: 91 MMHG | OXYGEN SATURATION: 99 % | WEIGHT: 183.4 LBS | HEART RATE: 80 BPM | BODY MASS INDEX: 23.87 KG/M2

## 2024-06-24 DIAGNOSIS — R79.89 ELEVATED FERRITIN: ICD-10-CM

## 2024-06-24 DIAGNOSIS — L63.9 ALOPECIA AREATA: Primary | ICD-10-CM

## 2024-06-24 DIAGNOSIS — R74.01 TRANSAMINITIS: ICD-10-CM

## 2024-06-24 PROCEDURE — 99214 OFFICE O/P EST MOD 30 MIN: CPT | Mod: 25 | Performed by: FAMILY MEDICINE

## 2024-06-24 PROCEDURE — 11901 INJECT SKIN LESIONS >7: CPT | Performed by: FAMILY MEDICINE

## 2024-06-24 RX ORDER — TRIAMCINOLONE ACETONIDE 40 MG/ML
10 INJECTION, SUSPENSION INTRA-ARTICULAR; INTRAMUSCULAR ONCE
Status: COMPLETED | OUTPATIENT
Start: 2024-06-24 | End: 2024-06-24

## 2024-06-24 RX ADMIN — TRIAMCINOLONE ACETONIDE 10 MG: 40 INJECTION, SUSPENSION INTRA-ARTICULAR; INTRAMUSCULAR at 15:16

## 2024-06-24 NOTE — PROGRESS NOTES
Answers submitted by the patient for this visit:  General Questionnaire (Submitted on 6/18/2024)  Chief Complaint: Chronic problems general questions HPI Form  How many servings of fruits and vegetables do you eat daily?: 2-3  On average, how many sweetened beverages do you drink each day (Examples: soda, juice, sweet tea, etc.  Do NOT count diet or artificially sweetened beverages)?: 2  How many minutes a day do you exercise enough to make your heart beat faster?: 20 to 29  How many days a week do you exercise enough to make your heart beat faster?: 3 or less  How many days per week do you miss taking your medication?: 0  General Concern (Submitted on 6/18/2024)  Chief Complaint: Chronic problems general questions HPI Form  What is the reason for your visit today?: Cortizone Shot  When did your symptoms begin?: More than a month      Subjective     Yimi is a 58 year old patient who presents to clinic for scheduled intralesional corticosteroid injections of scalp for alopecia areata.    In addition, he has persistent transaminitis with elevated ferritin.  He has not schedule GI consultation or his ultrasound yet but plans to soon.  He is asymptomatic.        Review of Systems   Constitutional, HEENT, cardiovascular, pulmonary, GI, , musculoskeletal, neuro, skin, endocrine and psych systems are negative, except as otherwise noted.      Objective    BP (!) 128/91   Pulse 80   Wt 83.2 kg (183 lb 6.4 oz)   SpO2 99%   BMI 23.87 kg/m      General: Well appearing, NAD  Skin: there is diffuse thinning of the hair and receding hairline.  Within this background, there are several well circumscribed oval areas of patchy hair loss.  The skin is normal and non-scarring and numerous velus hairs are visualized.    Psych: normal mood and affect    PROCEDURE:     The potential risks of the procedure were discussed and understood, including the potential for skin atrophy, telangiectasia, and hypopigmentation.  Verbal consent  was obtained.  All questions were answered.  Triamcinolone was diluted in saline to 5 mg/mL concentration.  Using a 30 gauge 1/2 inch needle, 0.1 mL was injected into the affected areas of the dermis at 1 cm intervals with no more than 0.1 mL per site.  A total of 0.8 mL was injected this session.  The patient tolerated the procedure well.  Anticipatory guidance was given and understood.      Alopecia areata  Procedure as above.  Follow up in one month.  Discussed dermatology referral and he will consider    Transaminitis  GI referral and US pending    Elevated ferritin  See above    Follow up in 1 month, sooner if concerns.

## 2024-07-16 ENCOUNTER — ANCILLARY PROCEDURE (OUTPATIENT)
Dept: ULTRASOUND IMAGING | Facility: CLINIC | Age: 59
End: 2024-07-16
Attending: FAMILY MEDICINE
Payer: COMMERCIAL

## 2024-07-16 DIAGNOSIS — K76.0 HEPATIC FIBROSIS DUE TO NONALCOHOLIC FATTY LIVER DISEASE: Primary | ICD-10-CM

## 2024-07-16 DIAGNOSIS — K74.00 HEPATIC FIBROSIS DUE TO NONALCOHOLIC FATTY LIVER DISEASE: Primary | ICD-10-CM

## 2024-07-16 DIAGNOSIS — R74.01 TRANSAMINITIS: ICD-10-CM

## 2024-07-16 PROCEDURE — 76705 ECHO EXAM OF ABDOMEN: CPT

## 2024-07-22 ENCOUNTER — OFFICE VISIT (OUTPATIENT)
Dept: FAMILY MEDICINE | Facility: CLINIC | Age: 59
End: 2024-07-22

## 2024-07-22 VITALS — SYSTOLIC BLOOD PRESSURE: 126 MMHG | OXYGEN SATURATION: 99 % | HEART RATE: 84 BPM | DIASTOLIC BLOOD PRESSURE: 82 MMHG

## 2024-07-22 DIAGNOSIS — K74.00 HEPATIC FIBROSIS: ICD-10-CM

## 2024-07-22 DIAGNOSIS — R74.01 TRANSAMINITIS: ICD-10-CM

## 2024-07-22 DIAGNOSIS — L63.9 ALOPECIA AREATA: Primary | ICD-10-CM

## 2024-07-22 PROCEDURE — 11901 INJECT SKIN LESIONS >7: CPT | Performed by: FAMILY MEDICINE

## 2024-07-22 PROCEDURE — 99214 OFFICE O/P EST MOD 30 MIN: CPT | Mod: 25 | Performed by: FAMILY MEDICINE

## 2024-07-22 RX ORDER — TRIAMCINOLONE ACETONIDE 40 MG/ML
5 INJECTION, SUSPENSION INTRA-ARTICULAR; INTRAMUSCULAR ONCE
Status: COMPLETED | OUTPATIENT
Start: 2024-07-22 | End: 2024-07-22

## 2024-07-22 RX ADMIN — TRIAMCINOLONE ACETONIDE 5 MG: 40 INJECTION, SUSPENSION INTRA-ARTICULAR; INTRAMUSCULAR at 17:42

## 2024-07-22 NOTE — PROGRESS NOTES
Subjective     Yimi is a 59 year old patient who presents to clinic for follow up of alopecia areata.  No definite improvement after kenalog injections on 6/24.  No new areas.    Transaminitis: US showed coarse echotexture and fibrotic change.  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  Reports he has 3-4 beers a couple times per week on average.  I ordered elastography but he declined for now.  Has GI Friday.  Feels he has had a lot of medical tests and is reluctant to do more right now.  Denies jaundice.  No abdominal distension.          Review of Systems   Constitutional, HEENT, cardiovascular, pulmonary, GI, , musculoskeletal, neuro, skin, endocrine and psych systems are negative, except as otherwise noted.      Objective    /82   Pulse 84   SpO2 99%     General: Well appearing, NAD  Scalp: patchy areas of hair loss without scarring  Psych: normal mood and affect      PROCEDURE:     The potential risks of the procedure were discussed and understood, including the potential for skin atrophy, telangiectasia, and hypopigmentation.  Verbal consent was obtained.  All questions were answered.  Triamcinolone was diluted in saline to 5 mg/mL concentration.  Using a 30 gauge 1/2 inch needle, 0.1 mL was injected into the affected areas of the dermis at 1 cm intervals with no more than 0.1 mL per site.  A total of 1 mL was injected this session.  The patient tolerated the procedure well.  Anticipatory guidance was given and understood.    Alopecia areata  Procedure as above  Recheck in one month, sooner if concerns    Transaminitis  Discussed my concern at length.  Worried about fibrotic findings on US and persistent transaminitis.  Heterozygous for C282Y.  Etiology remains uncertain.  Counseled to abstain from alcohol.  Await GI input in 4 days.  Very close follow up    Hepatic fibrosis  See above              Answers submitted by the patient for this visit:  General  Questionnaire (Submitted on 7/22/2024)  Chief Complaint: Chronic problems general questions HPI Form  What is the reason for your visit today? : cortizone shot  How many servings of fruits and vegetables do you eat daily?: 4 or more  On average, how many sweetened beverages do you drink each day (Examples: soda, juice, sweet tea, etc.  Do NOT count diet or artificially sweetened beverages)?: 0  How many minutes a day do you exercise enough to make your heart beat faster?: 30 to 60  How many days a week do you exercise enough to make your heart beat faster?: 5  How many days per week do you miss taking your medication?: 0

## 2024-07-26 ENCOUNTER — TRANSFERRED RECORDS (OUTPATIENT)
Dept: FAMILY MEDICINE | Facility: CLINIC | Age: 59
End: 2024-07-26

## 2024-08-20 ENCOUNTER — OFFICE VISIT (OUTPATIENT)
Dept: FAMILY MEDICINE | Facility: CLINIC | Age: 59
End: 2024-08-20

## 2024-08-20 VITALS
BODY MASS INDEX: 24.05 KG/M2 | SYSTOLIC BLOOD PRESSURE: 132 MMHG | OXYGEN SATURATION: 98 % | WEIGHT: 184.8 LBS | DIASTOLIC BLOOD PRESSURE: 85 MMHG | HEART RATE: 77 BPM

## 2024-08-20 DIAGNOSIS — I10 ESSENTIAL HYPERTENSION: ICD-10-CM

## 2024-08-20 DIAGNOSIS — K74.00 HEPATIC FIBROSIS: ICD-10-CM

## 2024-08-20 DIAGNOSIS — L63.9 ALOPECIA AREATA: Primary | ICD-10-CM

## 2024-08-20 DIAGNOSIS — K76.0 HEPATIC STEATOSIS: ICD-10-CM

## 2024-08-20 PROCEDURE — 11901 INJECT SKIN LESIONS >7: CPT | Performed by: FAMILY MEDICINE

## 2024-08-20 PROCEDURE — 3075F SYST BP GE 130 - 139MM HG: CPT | Performed by: FAMILY MEDICINE

## 2024-08-20 PROCEDURE — 99213 OFFICE O/P EST LOW 20 MIN: CPT | Mod: 25 | Performed by: FAMILY MEDICINE

## 2024-08-20 RX ORDER — TRIAMCINOLONE ACETONIDE 40 MG/ML
5 INJECTION, SUSPENSION INTRA-ARTICULAR; INTRAMUSCULAR ONCE
Status: COMPLETED | OUTPATIENT
Start: 2024-08-20 | End: 2024-08-20

## 2024-08-20 RX ADMIN — TRIAMCINOLONE ACETONIDE 5 MG: 40 INJECTION, SUSPENSION INTRA-ARTICULAR; INTRAMUSCULAR at 16:25

## 2024-08-20 NOTE — PROGRESS NOTES
Answers submitted by the patient for this visit:  General Questionnaire (Submitted on 8/19/2024)  Chief Complaint: Chronic problems general questions HPI Form  What is the reason for your visit today? : CT  How many servings of fruits and vegetables do you eat daily?: 2-3  On average, how many sweetened beverages do you drink each day (Examples: soda, juice, sweet tea, etc.  Do NOT count diet or artificially sweetened beverages)?: 2  How many minutes a day do you exercise enough to make your heart beat faster?: 9 or less  How many days a week do you exercise enough to make your heart beat faster?: 4  How many days per week do you miss taking your medication?: 0        Subjective     Yimi is a 59 year old patient who presents to clinic for follow up.    Alopecia areata: second episode.  Resolved after intralesional injections previously.  Now has received two round for most recent episode with minimal results.  Large patch of absent hair left temporal side of head      Hepatic steatosis with apparent fibrosis on US.  Seen by Eugene MORILLO.  Reviewed note.  Working on losing weight and reducing alcohol with follow up planned.      Review of Systems   Constitutional, HEENT, cardiovascular, pulmonary, GI, , musculoskeletal, neuro, skin, endocrine and psych systems are negative, except as otherwise noted.      Objective    /85   Pulse 77   Wt 83.8 kg (184 lb 12.8 oz)   SpO2 98%   BMI 24.05 kg/m      General: Well appearing, NAD  Skin: area of absent hair left temporal aspect of scalp  Psych: normal mood and affect    PROCEDURE:     The potential risks of the procedure were discussed and understood, including the potential for skin atrophy, telangiectasia, and hypopigmentation.  Verbal consent was obtained.  All questions were answered.  Triamcinolone was diluted in saline to 5 mg/mL concentration.  Using a 30 gauge 1/2 inch needle, 0.1 mL was injected into the affected areas of the dermis at 1 cm intervals  with no more than 0.1 mL per site.  A total of 1.5  mL was injected this session.  The patient tolerated the procedure well.  Anticipatory guidance was given and understood.    Alopecia areata  See above  If improving will repeat in one month  If not improving will refer to derm    Hepatic fibrosis  Cont GI follow up and cessation alcohol with weight loss    Hepatic steatosis  See above    Essential hypertension  stable/controlled. Cont current medication(s) and treatment  - MOST RECENT SYSTOLIC BLOOD PRESS -139MM HG

## 2024-09-18 DIAGNOSIS — E78.5 HYPERLIPIDEMIA WITH TARGET LDL LESS THAN 100: ICD-10-CM

## 2024-09-19 NOTE — TELEPHONE ENCOUNTER
"Med: Rosuvastatin      LOV (related): 6/11/24-cpx    Last Lab: 6/11/24      Due for F/U around: 1 year for CPX      Next Appt: No current future appointments scheduled at [facility]         Cholesterol   Date Value Ref Range Status   06/11/2024 133 100 - 199 mg/dL Final   02/09/2023 245 (A) 100 - 199 mg/dl Final   09/10/2020 214 (H) 100 - 199 mg/dL Final   05/15/2019 214 (H) 100 - 199 mg/dL Final     HDL Cholesterol   Date Value Ref Range Status   09/10/2020 41 >39 mg/dL Final   05/15/2019 53 >39 mg/dL Final     HDL   Date Value Ref Range Status   06/11/2024 38 (A) 40 mg/dL Final   02/09/2023 35 (A) 40 mg/dl Final     LDL Cholesterol Calculated   Date Value Ref Range Status   09/10/2020 154 (H) 0 - 99 mg/dL Final   05/15/2019 144 (H) 0 - 99 mg/dL Final     LDL CALCULATED (RM)   Date Value Ref Range Status   06/11/2024 85 0 - 130 mg/dL Final   02/09/2023 193 (A) 0 - 130 mg/dl Final     Triglycerides   Date Value Ref Range Status   06/11/2024 51 0 - 149 mg/dL Final   02/09/2023 83 0 - 149 mg/dl Final   09/10/2020 105 0 - 149 mg/dL Final   05/15/2019 86 0 - 149 mg/dL Final     No results found for: \"CHOLHDLRATIO\"     "

## 2024-09-20 RX ORDER — ROSUVASTATIN CALCIUM 10 MG/1
10 TABLET, COATED ORAL DAILY
Qty: 90 TABLET | Refills: 0 | Status: SHIPPED | OUTPATIENT
Start: 2024-09-20

## 2024-10-24 DIAGNOSIS — I10 ESSENTIAL HYPERTENSION: ICD-10-CM

## 2024-10-24 NOTE — TELEPHONE ENCOUNTER
Med: Chlorthalidone     LOV (related): 8/20/24    Last Lab: 6/11/24      Due for F/U around: Not specified CPX due 6/11/25    Next Appt: Not Scheduled        BP Readings from Last 3 Encounters:   08/20/24 132/85   07/22/24 126/82   06/24/24 (!) 128/91       Last Comprehensive Metabolic Panel:  Lab Results   Component Value Date     (L) 06/11/2024    POTASSIUM 3.5 06/11/2024    CHLORIDE 91 (L) 06/11/2024    CO2 30 (H) 06/11/2024    ANIONGAP 12 06/11/2024    GLC 93 06/11/2024    BUN 12.5 06/11/2024    CR 0.76 06/11/2024    GFRESTIMATED >90 06/11/2024    THANG 9.3 06/11/2024

## 2024-10-25 RX ORDER — CHLORTHALIDONE 25 MG/1
25 TABLET ORAL DAILY
Qty: 90 TABLET | Refills: 0 | Status: SHIPPED | OUTPATIENT
Start: 2024-10-25

## 2024-12-15 NOTE — ED NOTES
Bed: ED02  Expected date:   Expected time:   Means of arrival:   Comments:  Stabe 1   Subjective   Patient ID: Caleb Rojas is a 37 year old male.    Chief Complaint   Patient presents with    Dysuria     Patient repots discomfort with urination starting 2 days ago, patient has genital concerns.     Dysuria  This is a new problem. Episode onset: 2 days ago. The problem occurs constantly. The problem has been resolved. Pertinent negatives include no abdominal pain, chest pain, chills, coughing, fatigue, fever, headaches, myalgias, nausea, rash, sore throat, swollen glands, urinary symptoms, vertigo or vomiting. Nothing aggravates the symptoms. He has tried nothing for the symptoms. The treatment provided no relief.         No past medical history on file.    MEDICATIONS:  Current Outpatient Medications   Medication Sig    doxycycline hyclate (VIBRA-TABS) 100 MG tablet Take 1 tablet by mouth in the morning and 1 tablet in the evening. Do all this for 7 days.    nystatin (MYCOSTATIN) 026019 UNIT/GM cream Apply bid to affected area     No current facility-administered medications for this visit.       ALLERGIES:  ALLERGIES:  No Known Allergies    PAST SURGICAL HISTORY:  No past surgical history on file.    FAMILY HISTORY:  Family History   Problem Relation Age of Onset    Patient is unaware of any medical problems Mother     Hypertension Father        SOCIAL HISTORY:  Social History     Tobacco Use    Smoking status: Never    Smokeless tobacco: Never   Substance Use Topics    Alcohol use: Yes     Comment: socially         Review of Systems   Constitutional:  Negative for chills, fatigue and fever.   HENT:  Negative for sore throat.    Respiratory:  Negative for cough and shortness of breath.    Cardiovascular:  Negative for chest pain and palpitations.   Gastrointestinal:  Negative for abdominal pain, constipation, diarrhea, nausea and vomiting.   Genitourinary:  Positive for dysuria. Negative for decreased urine volume, difficulty urinating, flank pain, frequency, penile discharge, penile pain, penile  swelling, scrotal swelling, testicular pain and urgency.   Musculoskeletal:  Negative for myalgias.   Skin:  Negative for rash.   Neurological:  Negative for vertigo and headaches.       Objective   Physical Exam  Constitutional:       Appearance: Normal appearance.   Neurological:      Mental Status: He is alert.       Visit Vitals  /78   Pulse 75   Temp 98.5 °F (36.9 °C) (Temporal)   Resp 16   SpO2 97%       ASSESSMENT:    Caleb Rojas was seen today for dysuria.    Diagnoses and all orders for this visit:    Possible exposure to STD    Other orders  -     doxycycline hyclate (VIBRA-TABS) 100 MG tablet; Take 1 tablet by mouth in the morning and 1 tablet in the evening. Do all this for 7 days.         - Pt presenting w/ concerns for possible STD exposure.  - no red flag symptoms such as testicular pain/swelling, abdominal pain, fevers/chills, nausea/vomiting  - initiated urine testing for gonorrhea/chlamydia, results pending and will call and update pt when results have returned  - prophylactic coverage with IM Rocephin 500mg and PO doxycycline 100mg BID x 7 days  - recommend pt f/u with PCP for full STD panel immediately. Pt refusing blood work at this time.   - educated pt to abstain from sexual activity until results of full STD panel have returned and pt has been treated accordingly.  - The following pertinent patient's history were reviewed and updated as appropriate: allergies, current medications, past medical history, past surgical history, personal and social history, problem list, clinical staff's note and vital signs.     - All questions and concerns addressed.    - If symptoms acutely worsen or if new/concerning symptoms develop then patient has been instructed to  go straight to the emergency room.

## 2025-01-01 ENCOUNTER — HOSPITAL ENCOUNTER (OUTPATIENT)
Facility: CLINIC | Age: 60
End: 2025-01-01
Payer: COMMERCIAL

## 2025-01-01 ENCOUNTER — HOSPITAL ENCOUNTER (OUTPATIENT)
Facility: CLINIC | Age: 60
End: 2025-01-01
Admitting: RADIOLOGY
Payer: COMMERCIAL

## 2025-01-01 ENCOUNTER — APPOINTMENT (OUTPATIENT)
Dept: INTERVENTIONAL RADIOLOGY/VASCULAR | Facility: CLINIC | Age: 60
End: 2025-01-01
Attending: INTERNAL MEDICINE
Payer: COMMERCIAL

## 2025-01-01 PROCEDURE — 99152 MOD SED SAME PHYS/QHP 5/>YRS: CPT

## 2025-01-09 ENCOUNTER — HOSPITAL ENCOUNTER (EMERGENCY)
Facility: CLINIC | Age: 60
Discharge: HOME | End: 2025-01-09
Attending: EMERGENCY MEDICINE
Payer: COMMERCIAL

## 2025-01-09 VITALS
RESPIRATION RATE: 8 BRPM | SYSTOLIC BLOOD PRESSURE: 138 MMHG | WEIGHT: 207.89 LBS | HEIGHT: 74 IN | HEART RATE: 73 BPM | OXYGEN SATURATION: 98 % | BODY MASS INDEX: 26.68 KG/M2 | DIASTOLIC BLOOD PRESSURE: 90 MMHG | TEMPERATURE: 98.2 F

## 2025-01-09 DIAGNOSIS — R55 SYNCOPE, UNSPECIFIED SYNCOPE TYPE: ICD-10-CM

## 2025-01-09 DIAGNOSIS — E87.1 HYPONATREMIA: ICD-10-CM

## 2025-01-09 DIAGNOSIS — S00.81XA ABRASION OF CHIN, INITIAL ENCOUNTER: ICD-10-CM

## 2025-01-09 LAB
ALBUMIN SERPL BCG-MCNC: 4.5 G/DL (ref 3.5–5.2)
ALP SERPL-CCNC: 98 U/L (ref 40–150)
ALT SERPL W P-5'-P-CCNC: 118 U/L (ref 0–70)
ANION GAP SERPL CALCULATED.3IONS-SCNC: 18 MMOL/L (ref 7–15)
AST SERPL W P-5'-P-CCNC: 99 U/L (ref 0–45)
ATRIAL RATE - MUSE: 100 BPM
BASOPHILS # BLD AUTO: 0.1 10E3/UL (ref 0–0.2)
BASOPHILS NFR BLD AUTO: 1 %
BILIRUB SERPL-MCNC: 1 MG/DL
BUN SERPL-MCNC: 12 MG/DL (ref 6–20)
CALCIUM SERPL-MCNC: 9.5 MG/DL (ref 8.8–10.4)
CHLORIDE SERPL-SCNC: 87 MMOL/L (ref 98–107)
CREAT SERPL-MCNC: 0.84 MG/DL (ref 0.67–1.17)
DIASTOLIC BLOOD PRESSURE - MUSE: NORMAL MMHG
EGFRCR SERPLBLD CKD-EPI 2021: >90 ML/MIN/1.73M2
EOSINOPHIL # BLD AUTO: 0.5 10E3/UL (ref 0–0.7)
EOSINOPHIL NFR BLD AUTO: 9 %
ERYTHROCYTE [DISTWIDTH] IN BLOOD BY AUTOMATED COUNT: 11.5 % (ref 10–15)
GLUCOSE SERPL-MCNC: 155 MG/DL (ref 70–99)
HCO3 SERPL-SCNC: 24 MMOL/L (ref 22–29)
HCT VFR BLD AUTO: 43.1 % (ref 40–53)
HGB BLD-MCNC: 15.8 G/DL (ref 13.3–17.7)
HOLD SPECIMEN: NORMAL
IMM GRANULOCYTES # BLD: 0 10E3/UL
IMM GRANULOCYTES NFR BLD: 1 %
INTERPRETATION ECG - MUSE: NORMAL
LYMPHOCYTES # BLD AUTO: 1.7 10E3/UL (ref 0.8–5.3)
LYMPHOCYTES NFR BLD AUTO: 32 %
MAGNESIUM SERPL-MCNC: 2.1 MG/DL (ref 1.7–2.3)
MCH RBC QN AUTO: 33.5 PG (ref 26.5–33)
MCHC RBC AUTO-ENTMCNC: 36.7 G/DL (ref 31.5–36.5)
MCV RBC AUTO: 91 FL (ref 78–100)
MONOCYTES # BLD AUTO: 0.5 10E3/UL (ref 0–1.3)
MONOCYTES NFR BLD AUTO: 10 %
NEUTROPHILS # BLD AUTO: 2.5 10E3/UL (ref 1.6–8.3)
NEUTROPHILS NFR BLD AUTO: 47 %
NRBC # BLD AUTO: 0 10E3/UL
NRBC BLD AUTO-RTO: 0 /100
P AXIS - MUSE: 53 DEGREES
PLATELET # BLD AUTO: 155 10E3/UL (ref 150–450)
POTASSIUM SERPL-SCNC: 3.8 MMOL/L (ref 3.4–5.3)
PR INTERVAL - MUSE: 158 MS
PROT SERPL-MCNC: 8 G/DL (ref 6.4–8.3)
QRS DURATION - MUSE: 88 MS
QT - MUSE: 346 MS
QTC - MUSE: 446 MS
R AXIS - MUSE: 232 DEGREES
RBC # BLD AUTO: 4.72 10E6/UL (ref 4.4–5.9)
SODIUM SERPL-SCNC: 129 MMOL/L (ref 135–145)
SYSTOLIC BLOOD PRESSURE - MUSE: NORMAL MMHG
T AXIS - MUSE: 20 DEGREES
TROPONIN T SERPL HS-MCNC: <6 NG/L
VENTRICULAR RATE- MUSE: 100 BPM
WBC # BLD AUTO: 5.2 10E3/UL (ref 4–11)

## 2025-01-09 PROCEDURE — 80053 COMPREHEN METABOLIC PANEL: CPT | Performed by: EMERGENCY MEDICINE

## 2025-01-09 PROCEDURE — 258N000003 HC RX IP 258 OP 636: Performed by: EMERGENCY MEDICINE

## 2025-01-09 PROCEDURE — 96360 HYDRATION IV INFUSION INIT: CPT

## 2025-01-09 PROCEDURE — 93005 ELECTROCARDIOGRAM TRACING: CPT

## 2025-01-09 PROCEDURE — 83735 ASSAY OF MAGNESIUM: CPT | Performed by: EMERGENCY MEDICINE

## 2025-01-09 PROCEDURE — 99284 EMERGENCY DEPT VISIT MOD MDM: CPT

## 2025-01-09 PROCEDURE — 85018 HEMOGLOBIN: CPT | Performed by: EMERGENCY MEDICINE

## 2025-01-09 PROCEDURE — 84484 ASSAY OF TROPONIN QUANT: CPT | Performed by: EMERGENCY MEDICINE

## 2025-01-09 PROCEDURE — 85004 AUTOMATED DIFF WBC COUNT: CPT | Performed by: EMERGENCY MEDICINE

## 2025-01-09 PROCEDURE — 36415 COLL VENOUS BLD VENIPUNCTURE: CPT | Performed by: EMERGENCY MEDICINE

## 2025-01-09 PROCEDURE — 82310 ASSAY OF CALCIUM: CPT | Performed by: EMERGENCY MEDICINE

## 2025-01-09 RX ADMIN — SODIUM CHLORIDE 1000 ML: 9 INJECTION, SOLUTION INTRAVENOUS at 06:49

## 2025-01-09 ASSESSMENT — COLUMBIA-SUICIDE SEVERITY RATING SCALE - C-SSRS
1. IN THE PAST MONTH, HAVE YOU WISHED YOU WERE DEAD OR WISHED YOU COULD GO TO SLEEP AND NOT WAKE UP?: NO
2. HAVE YOU ACTUALLY HAD ANY THOUGHTS OF KILLING YOURSELF IN THE PAST MONTH?: NO
6. HAVE YOU EVER DONE ANYTHING, STARTED TO DO ANYTHING, OR PREPARED TO DO ANYTHING TO END YOUR LIFE?: NO

## 2025-01-09 ASSESSMENT — ACTIVITIES OF DAILY LIVING (ADL)
ADLS_ACUITY_SCORE: 41
ADLS_ACUITY_SCORE: 41

## 2025-01-09 NOTE — ED NOTES
[facility]  ED Nurse Handoff Report    ED Chief complaint: Loss of Consciousness      ED Diagnosis:   Final diagnoses:   None       Code Status: Inpatient team to discuss code status with patient    Allergies:   Allergies   Allergen Reactions    Lisinopril Angioedema    Losartan Angioedema       Patient Story: Patient coming in with EMS.  While at home patient was standing at the edge of the bed and was unresponsive, then patient fell face forward. Wife rolled him over and pt was still unresponsive. Wife called 911. Patient became responsive but was confused after the episode.  Pt has similar thing happen in June, which they thought was orthostatic hypotension.  No hx of seizures.  Bg 133. Pt has abrasion on his chin from fall.  Denies pain anywhere.          Focused Assessment:   Abnormal Labs Resulted from Time of ED Arrival to Time of ED Departure   COMPREHENSIVE METABOLIC PANEL - Abnormal       Result Value    Sodium 129 (*)     Potassium 3.8      Carbon Dioxide (CO2) 24      Anion Gap 18 (*)     Urea Nitrogen 12.0      Creatinine 0.84      GFR Estimate >90      Calcium 9.5      Chloride 87 (*)     Glucose 155 (*)     Alkaline Phosphatase 98      AST 99 (*)      (*)     Protein Total 8.0      Albumin 4.5      Bilirubin Total 1.0     CBC WITH PLATELETS AND DIFFERENTIAL - Abnormal    WBC Count 5.2      RBC Count 4.72      Hemoglobin 15.8      Hematocrit 43.1      MCV 91      MCH 33.5 (*)     MCHC 36.7 (*)     RDW 11.5      Platelet Count 155      % Neutrophils 47      % Lymphocytes 32      % Monocytes 10      % Eosinophils 9      % Basophils 1      % Immature Granulocytes 1      NRBCs per 100 WBC 0      Absolute Neutrophils 2.5      Absolute Lymphocytes 1.7      Absolute Monocytes 0.5      Absolute Eosinophils 0.5      Absolute Basophils 0.1      Absolute Immature Granulocytes 0.0      Absolute NRBCs 0.0         No orders to display       Treatments and/or interventions provided:  "  Medications   lido-EPINEPHrine-tetracaine (LET) topical gel GEL (has no administration in time range)   sodium chloride 0.9% BOLUS 1,000 mL (1,000 mLs Intravenous $New Bag 1/9/25 0649)       Patient's response to treatments and/or interventions: Improving    To be done/followed up on inpatient unit:  Follow Plan of Care    Does this patient have any cognitive concerns?: A&Ox4    Activity level - Baseline/Home:  Independent  Activity Level - Current:   Stand with Assist    Patient's Preferred language: English   Needed?: No    Isolation: None  Infection: Not Applicable  Patient tested for COVID 19 prior to admission: NO  Bariatric?: No    Vital Signs:   Vitals:    01/09/25 0525 01/09/25 0600 01/09/25 0650   BP: (!) 137/96 119/83 116/78   Pulse: 106 85 74   Resp: 16 12 12   Temp: 98.2  F (36.8  C)     TempSrc: Oral     SpO2: 94% 95% 98%   Weight: 94.3 kg (207 lb 14.3 oz)     Height: 1.88 m (6' 2\")         Cardiac Rhythm:     Was the PSS-3 completed:   Yes  What interventions are required if any?               Family Comments: Family at Bedside  OBS brochure/video discussed/provided to patient/family: N/A    For the majority of the shift this patient's behavior was Green.   Behavioral interventions performed were None.    ED NURSE PHONE NUMBER: [phone]         "

## 2025-01-09 NOTE — ED PROVIDER NOTES
Emergency Department Note      History of Present Illness     Chief Complaint   Loss of Consciousness      HPI   Yimi Gibbs is a 59 year old male with a history of hypertension and hyperlipidemia who presents after he had a fall in his bedroom this morning.  Per his report he got up to go the bathroom and next he knew he was on the floor.  His wife states that she heard him get up and then looked up and some standing at the edge of the bed with his head cocked to the side.  She thought he was joking with her and then he passed out landing on the ground.  He has a small abrasion to his chin.  He denies any pain at this time.  He had a similar episode a while back after being on a boat in the hot sun all day and having couple of drinks.  He denies any recent illnesses or chest pain.  Has no complaints at this time.  He does not remember the event.  No other concerns at this moment.    Independent Historian   Wife as detailed above.    Review of External Notes   None    Past Medical History     Medical History and Problem List   Past Medical History:   Diagnosis Date    Abductor spasmodic dysphonia     Hyperlipidaemia LDL goal < 100     Hypertension        Medications   albuterol (PROAIR HFA/PROVENTIL HFA/VENTOLIN HFA) 108 (90 Base) MCG/ACT inhaler  amLODIPine (NORVASC) 5 MG tablet  cetirizine (ZYRTEC) 10 MG tablet  chlorthalidone (HYGROTON) 25 MG tablet  QVAR REDIHALER 40 MCG/ACT inhaler  rosuvastatin (CRESTOR) 10 MG tablet        Surgical History   Past Surgical History:   Procedure Laterality Date    BUNIONECTOMY  2010    EXCISE MASS FOOT N/A 2/14/2023    Procedure: Fifth Metatarsal Head Resection Left Foot, debridment of Ulcer;  Surgeon: Kerrie Childs DPM;  Location: [facility] OR    SEPTOPLASTY  2007    THYROPLASTY  2001       Physical Exam     Patient Vitals for the past 24 hrs:   BP Temp Temp src Pulse Resp SpO2 Height Weight   01/09/25 0700 (!) 138/90 -- -- 73 (!) 8 98 % -- --   01/09/25 0650 116/78 --  "-- 74 12 98 % -- --   01/09/25 0600 119/83 -- -- 85 12 95 % -- --   01/09/25 0525 (!) 137/96 98.2  F (36.8  C) Oral 106 16 94 % 1.88 m (6' 2\") 94.3 kg (207 lb 14.3 oz)     Physical Exam  Constitutional: Vital signs reviewed as above  General: Alert, pleasant  HEENT: Moist mucous membranes, small abrasion to the left chin with no active bleeding or laceration.  Eyes: Pupils are equal, round, and reactive to light.   Neck: Normal range of motion  Cardiovascular: normal rate, Regular rhythm and normal heart sounds.  Mo MRG  Pulmonary/Chest: Effort normal and breath sounds normal. No respiratory distress. Patient has no wheezes. Patient has no rales.   Gastrointestinal: Soft. Positive bowel sounds. No MRG.  Musculoskeletal/Extremities: Full ROM.  Endo: No pitting edema  Neurological: A/O x 3. CN-II-XII intact bilaterally. No pronator drift. Normal strength and sensation throughout all 4 extremities. GCS 15  Skin: Skin is warm and dry.   Psychiatric: Pleasant      Diagnostics     Lab Results   Labs Ordered and Resulted from Time of ED Arrival to Time of ED Departure   COMPREHENSIVE METABOLIC PANEL - Abnormal       Result Value    Sodium 129 (*)     Potassium 3.8      Carbon Dioxide (CO2) 24      Anion Gap 18 (*)     Urea Nitrogen 12.0      Creatinine 0.84      GFR Estimate >90      Calcium 9.5      Chloride 87 (*)     Glucose 155 (*)     Alkaline Phosphatase 98      AST 99 (*)      (*)     Protein Total 8.0      Albumin 4.5      Bilirubin Total 1.0     CBC WITH PLATELETS AND DIFFERENTIAL - Abnormal    WBC Count 5.2      RBC Count 4.72      Hemoglobin 15.8      Hematocrit 43.1      MCV 91      MCH 33.5 (*)     MCHC 36.7 (*)     RDW 11.5      Platelet Count 155      % Neutrophils 47      % Lymphocytes 32      % Monocytes 10      % Eosinophils 9      % Basophils 1      % Immature Granulocytes 1      NRBCs per 100 WBC 0      Absolute Neutrophils 2.5      Absolute Lymphocytes 1.7      Absolute Monocytes 0.5      " Absolute Eosinophils 0.5      Absolute Basophils 0.1      Absolute Immature Granulocytes 0.0      Absolute NRBCs 0.0     MAGNESIUM - Normal    Magnesium 2.1     TROPONIN T, HIGH SENSITIVITY - Normal    Troponin T, High Sensitivity <6         Imaging   No orders to display       EKG   ECG results from 01/09/25   EKG 12-lead, tracing only     Value    Systolic Blood Pressure     Diastolic Blood Pressure     Ventricular Rate 100    Atrial Rate 100    LA Interval 158    QRS Duration 88        QTc 446    P Axis 53    R AXIS 232    T Axis 20    Interpretation ECG      Sinus rhythm  Right superior axis deviation  Abnormal ECG  When compared with ECG of 07-Feb-2013 09:11,  MANUAL COMPARISON REQUIRED PREVIOUS ECG IS INCOMPATIBLE           Independent Interpretation   EKG from today shows sinus rhythm with a rate of 100.  No ischemic changes.    ED Course      Medications Administered   Medications   lido-EPINEPHrine-tetracaine (LET) topical gel GEL (has no administration in time range)   sodium chloride 0.9% BOLUS 1,000 mL (0 mLs Intravenous Stopped 1/9/25 0747)       Procedures   Procedures     Discussion of Management   None    ED Course        Additional Documentation  None    Medical Decision Making / Diagnosis     CMS Diagnoses: None    MIPS       None    MDM   Yimi Gibbs is a 59 year old male who presents after having a presumed syncopal episode earlier this morning.  Neurologically here he is intact.  He does have an abrasion that does not require repair and there is no active bleeding.  He has no concerns at this time.  His EKG was nondiagnostic.  He has a negative troponin at less than 6 and he is having no chest discomfort.  He does have a slightly low sodium and chloride at 129 and 87 suggestive of some mild dehydration.  LFTs are also slightly elevated but stable.  This is likely secondary to his hepatic steatosis and fibrosis.  The rest of his labs are unremarkable.  He was given a liter of  fluids and was feeling better.  Orthostatics did not show significant change in blood pressure but his pulse went from 80 sitting to 100 standing.  There was not a 20 point drop in his systolic blood pressure however.  He was feeling better after the fluids.  He is amatory in the department.  I do feel he safe to discharge home.  Follow-up with his doctor as needed.    Disposition   The patient was discharged.     Diagnosis     ICD-10-CM    1. Syncope, unspecified syncope type  R55       2. Abrasion of chin, initial encounter  S00.81XA       3. Hyponatremia  E87.1            Discharge Medications   Discharge Medication List as of 1/9/2025  7:25 AM            MD Ciera Bell Brent Aaron, MD  01/09/25 0736

## 2025-01-09 NOTE — ED TRIAGE NOTES
Patient coming in with EMS.  While at home patient was standing at the edge of the bed and was unresponsive, then patient fell face forward. Wife rolled him over and pt was still unresponsive. Wife called 911. Patient became responsive but was confused after the episode.  Pt has similar thing happen in June, which they thought was orthostatic hypotension.  No hx of seizures.  Bg 133. Pt has abrasion on his chin from fall.  Denies pain anywhere.

## 2025-01-09 NOTE — ED NOTES
Bed: [facility]  Expected date: 1/9/25  Expected time: 5:16 AM  Means of arrival: Ambulance  Comments:  HEMS 442 59M seizure; no history

## 2025-02-23 ENCOUNTER — APPOINTMENT (OUTPATIENT)
Dept: GENERAL RADIOLOGY | Facility: CLINIC | Age: 60
End: 2025-02-23
Attending: EMERGENCY MEDICINE
Payer: COMMERCIAL

## 2025-02-23 ENCOUNTER — APPOINTMENT (OUTPATIENT)
Dept: MRI IMAGING | Facility: CLINIC | Age: 60
End: 2025-02-23
Attending: INTERNAL MEDICINE
Payer: COMMERCIAL

## 2025-02-23 ENCOUNTER — APPOINTMENT (OUTPATIENT)
Dept: CT IMAGING | Facility: CLINIC | Age: 60
End: 2025-02-23
Attending: EMERGENCY MEDICINE
Payer: COMMERCIAL

## 2025-02-23 ENCOUNTER — HOSPITAL ENCOUNTER (INPATIENT)
Facility: CLINIC | Age: 60
LOS: 1 days | Discharge: HOME | End: 2025-02-24
Attending: EMERGENCY MEDICINE | Admitting: INTERNAL MEDICINE
Payer: COMMERCIAL

## 2025-02-23 DIAGNOSIS — Z78.9 ALCOHOL USE: Primary | ICD-10-CM

## 2025-02-23 DIAGNOSIS — E87.1 HYPONATREMIA: ICD-10-CM

## 2025-02-23 DIAGNOSIS — R56.9 SEIZURE (H): ICD-10-CM

## 2025-02-23 LAB
ALBUMIN SERPL BCG-MCNC: 4.3 G/DL (ref 3.5–5.2)
ALP SERPL-CCNC: 107 U/L (ref 40–150)
ALT SERPL W P-5'-P-CCNC: 110 U/L (ref 0–70)
ANION GAP SERPL CALCULATED.3IONS-SCNC: 21 MMOL/L (ref 7–15)
AST SERPL W P-5'-P-CCNC: 87 U/L (ref 0–45)
ATRIAL RATE - MUSE: 100 BPM
BASE EXCESS BLDV CALC-SCNC: 0 MMOL/L (ref -3–3)
BASE EXCESS BLDV CALC-SCNC: 6 MMOL/L (ref -3–3)
BASOPHILS # BLD AUTO: 0 10E3/UL (ref 0–0.2)
BASOPHILS NFR BLD AUTO: 1 %
BILIRUB SERPL-MCNC: 0.8 MG/DL
BUN SERPL-MCNC: 11.6 MG/DL (ref 6–20)
CALCIUM SERPL-MCNC: 9 MG/DL (ref 8.8–10.4)
CHLORIDE SERPL-SCNC: 84 MMOL/L (ref 98–107)
CREAT SERPL-MCNC: 0.8 MG/DL (ref 0.67–1.17)
D DIMER PPP FEU-MCNC: <0.27 UG/ML FEU (ref 0–0.5)
DIASTOLIC BLOOD PRESSURE - MUSE: NORMAL MMHG
EGFRCR SERPLBLD CKD-EPI 2021: >90 ML/MIN/1.73M2
EOSINOPHIL # BLD AUTO: 0.4 10E3/UL (ref 0–0.7)
EOSINOPHIL NFR BLD AUTO: 8 %
ERYTHROCYTE [DISTWIDTH] IN BLOOD BY AUTOMATED COUNT: 11.3 % (ref 10–15)
ETHANOL SERPL-MCNC: 0.04 G/DL
GLUCOSE SERPL-MCNC: 101 MG/DL (ref 70–99)
HCO3 BLDV-SCNC: 25 MMOL/L (ref 21–28)
HCO3 BLDV-SCNC: 30 MMOL/L (ref 21–28)
HCO3 SERPL-SCNC: 21 MMOL/L (ref 22–29)
HCT VFR BLD AUTO: 40 % (ref 40–53)
HGB BLD-MCNC: 14.5 G/DL (ref 13.3–17.7)
IMM GRANULOCYTES # BLD: 0 10E3/UL
IMM GRANULOCYTES NFR BLD: 0 %
INTERPRETATION ECG - MUSE: NORMAL
LACTATE BLD-SCNC: 2.5 MMOL/L
LACTATE BLD-SCNC: 6.1 MMOL/L
LACTATE SERPL-SCNC: 14.9 MMOL/L (ref 0.7–2)
LACTATE SERPL-SCNC: 3 MMOL/L (ref 0.7–2)
LYMPHOCYTES # BLD AUTO: 1.9 10E3/UL (ref 0.8–5.3)
LYMPHOCYTES NFR BLD AUTO: 36 %
MAGNESIUM SERPL-MCNC: 1.9 MG/DL (ref 1.7–2.3)
MCH RBC QN AUTO: 32.6 PG (ref 26.5–33)
MCHC RBC AUTO-ENTMCNC: 36.3 G/DL (ref 31.5–36.5)
MCV RBC AUTO: 90 FL (ref 78–100)
MONOCYTES # BLD AUTO: 0.6 10E3/UL (ref 0–1.3)
MONOCYTES NFR BLD AUTO: 10 %
NEUTROPHILS # BLD AUTO: 2.4 10E3/UL (ref 1.6–8.3)
NEUTROPHILS NFR BLD AUTO: 44 %
NRBC # BLD AUTO: 0 10E3/UL
NRBC BLD AUTO-RTO: 0 /100
P AXIS - MUSE: 42 DEGREES
PCO2 BLDV: 39 MM HG (ref 40–50)
PCO2 BLDV: 41 MM HG (ref 40–50)
PH BLDV: 7.4 [PH] (ref 7.32–7.43)
PH BLDV: 7.47 [PH] (ref 7.32–7.43)
PHOSPHATE SERPL-MCNC: 1.7 MG/DL (ref 2.5–4.5)
PHOSPHATE SERPL-MCNC: 3.9 MG/DL (ref 2.5–4.5)
PLATELET # BLD AUTO: 176 10E3/UL (ref 150–450)
PO2 BLDV: 41 MM HG (ref 25–47)
PO2 BLDV: 44 MM HG (ref 25–47)
POTASSIUM SERPL-SCNC: 2.9 MMOL/L (ref 3.4–5.3)
POTASSIUM SERPL-SCNC: 3.1 MMOL/L (ref 3.4–5.3)
POTASSIUM SERPL-SCNC: 3.5 MMOL/L (ref 3.4–5.3)
PR INTERVAL - MUSE: 160 MS
PROT SERPL-MCNC: 7.6 G/DL (ref 6.4–8.3)
QRS DURATION - MUSE: 96 MS
QT - MUSE: 360 MS
QTC - MUSE: 464 MS
R AXIS - MUSE: -69 DEGREES
RBC # BLD AUTO: 4.45 10E6/UL (ref 4.4–5.9)
SAO2 % BLDV: 76 % (ref 70–75)
SAO2 % BLDV: 83 % (ref 70–75)
SODIUM SERPL-SCNC: 126 MMOL/L (ref 135–145)
SYSTOLIC BLOOD PRESSURE - MUSE: NORMAL MMHG
T AXIS - MUSE: 14 DEGREES
TROPONIN T SERPL HS-MCNC: <6 NG/L
TSH SERPL DL<=0.005 MIU/L-ACNC: 1.05 UIU/ML (ref 0.3–4.2)
VENTRICULAR RATE- MUSE: 100 BPM
VIT B12 SERPL-MCNC: 559 PG/ML (ref 232–1245)
WBC # BLD AUTO: 5.3 10E3/UL (ref 4–11)

## 2025-02-23 PROCEDURE — 258N000003 HC RX IP 258 OP 636: Performed by: EMERGENCY MEDICINE

## 2025-02-23 PROCEDURE — 255N000002 HC RX 255 OP 636: Performed by: INTERNAL MEDICINE

## 2025-02-23 PROCEDURE — 36415 COLL VENOUS BLD VENIPUNCTURE: CPT | Performed by: INTERNAL MEDICINE

## 2025-02-23 PROCEDURE — 71046 X-RAY EXAM CHEST 2 VIEWS: CPT

## 2025-02-23 PROCEDURE — 83605 ASSAY OF LACTIC ACID: CPT

## 2025-02-23 PROCEDURE — 82803 BLOOD GASES ANY COMBINATION: CPT

## 2025-02-23 PROCEDURE — 99207 PR NO BILLABLE SERVICE THIS VISIT: CPT | Performed by: INTERNAL MEDICINE

## 2025-02-23 PROCEDURE — 250N000011 HC RX IP 250 OP 636: Performed by: EMERGENCY MEDICINE

## 2025-02-23 PROCEDURE — 36415 COLL VENOUS BLD VENIPUNCTURE: CPT | Performed by: EMERGENCY MEDICINE

## 2025-02-23 PROCEDURE — 82607 VITAMIN B-12: CPT | Performed by: PSYCHIATRY & NEUROLOGY

## 2025-02-23 PROCEDURE — 85379 FIBRIN DEGRADATION QUANT: CPT | Performed by: EMERGENCY MEDICINE

## 2025-02-23 PROCEDURE — 84443 ASSAY THYROID STIM HORMONE: CPT | Performed by: PSYCHIATRY & NEUROLOGY

## 2025-02-23 PROCEDURE — 85025 COMPLETE CBC W/AUTO DIFF WBC: CPT | Performed by: EMERGENCY MEDICINE

## 2025-02-23 PROCEDURE — 250N000013 HC RX MED GY IP 250 OP 250 PS 637: Performed by: EMERGENCY MEDICINE

## 2025-02-23 PROCEDURE — 96360 HYDRATION IV INFUSION INIT: CPT | Mod: 59

## 2025-02-23 PROCEDURE — 250N000013 HC RX MED GY IP 250 OP 250 PS 637: Performed by: INTERNAL MEDICINE

## 2025-02-23 PROCEDURE — 258N000003 HC RX IP 258 OP 636: Performed by: INTERNAL MEDICINE

## 2025-02-23 PROCEDURE — HZ2ZZZZ DETOXIFICATION SERVICES FOR SUBSTANCE ABUSE TREATMENT: ICD-10-PCS | Performed by: HOSPITALIST

## 2025-02-23 PROCEDURE — 99285 EMERGENCY DEPT VISIT HI MDM: CPT | Mod: 25

## 2025-02-23 PROCEDURE — 82077 ASSAY SPEC XCP UR&BREATH IA: CPT | Performed by: EMERGENCY MEDICINE

## 2025-02-23 PROCEDURE — 70450 CT HEAD/BRAIN W/O DYE: CPT

## 2025-02-23 PROCEDURE — 70553 MRI BRAIN STEM W/O & W/DYE: CPT

## 2025-02-23 PROCEDURE — 84484 ASSAY OF TROPONIN QUANT: CPT | Performed by: EMERGENCY MEDICINE

## 2025-02-23 PROCEDURE — 36415 COLL VENOUS BLD VENIPUNCTURE: CPT

## 2025-02-23 PROCEDURE — 80053 COMPREHEN METABOLIC PANEL: CPT | Performed by: EMERGENCY MEDICINE

## 2025-02-23 PROCEDURE — 250N000013 HC RX MED GY IP 250 OP 250 PS 637: Performed by: PSYCHIATRY & NEUROLOGY

## 2025-02-23 PROCEDURE — 96361 HYDRATE IV INFUSION ADD-ON: CPT | Mod: 59

## 2025-02-23 PROCEDURE — 99223 1ST HOSP IP/OBS HIGH 75: CPT | Performed by: INTERNAL MEDICINE

## 2025-02-23 PROCEDURE — 83735 ASSAY OF MAGNESIUM: CPT | Performed by: INTERNAL MEDICINE

## 2025-02-23 PROCEDURE — 84132 ASSAY OF SERUM POTASSIUM: CPT | Performed by: INTERNAL MEDICINE

## 2025-02-23 PROCEDURE — 120N000001 HC R&B MED SURG/OB

## 2025-02-23 PROCEDURE — A9585 GADOBUTROL INJECTION: HCPCS | Performed by: INTERNAL MEDICINE

## 2025-02-23 PROCEDURE — 84100 ASSAY OF PHOSPHORUS: CPT | Performed by: INTERNAL MEDICINE

## 2025-02-23 PROCEDURE — 93005 ELECTROCARDIOGRAM TRACING: CPT

## 2025-02-23 RX ORDER — POTASSIUM CHLORIDE 1500 MG/1
40 TABLET, EXTENDED RELEASE ORAL ONCE
Status: COMPLETED | OUTPATIENT
Start: 2025-02-23 | End: 2025-02-23

## 2025-02-23 RX ORDER — DIAZEPAM 10 MG/2ML
5-10 INJECTION, SOLUTION INTRAMUSCULAR; INTRAVENOUS EVERY 30 MIN PRN
Status: DISCONTINUED | OUTPATIENT
Start: 2025-02-23 | End: 2025-02-24 | Stop reason: HOSPADM

## 2025-02-23 RX ORDER — MULTIPLE VITAMINS W/ MINERALS TAB 9MG-400MCG
1 TAB ORAL ONCE
Status: COMPLETED | OUTPATIENT
Start: 2025-02-23 | End: 2025-02-23

## 2025-02-23 RX ORDER — BISACODYL 10 MG
10 SUPPOSITORY, RECTAL RECTAL DAILY PRN
Status: DISCONTINUED | OUTPATIENT
Start: 2025-02-23 | End: 2025-02-24 | Stop reason: HOSPADM

## 2025-02-23 RX ORDER — LEVETIRACETAM 500 MG/1
500 TABLET ORAL 2 TIMES DAILY
Status: DISCONTINUED | OUTPATIENT
Start: 2025-02-23 | End: 2025-02-24 | Stop reason: HOSPADM

## 2025-02-23 RX ORDER — DIAZEPAM 5 MG/1
10 TABLET ORAL EVERY 30 MIN PRN
Status: DISCONTINUED | OUTPATIENT
Start: 2025-02-23 | End: 2025-02-24 | Stop reason: HOSPADM

## 2025-02-23 RX ORDER — HALOPERIDOL 5 MG/ML
2.5-5 INJECTION INTRAMUSCULAR EVERY 6 HOURS PRN
Status: DISCONTINUED | OUTPATIENT
Start: 2025-02-23 | End: 2025-02-24 | Stop reason: HOSPADM

## 2025-02-23 RX ORDER — CALCIUM CARBONATE 500 MG/1
1000 TABLET, CHEWABLE ORAL 4 TIMES DAILY PRN
Status: DISCONTINUED | OUTPATIENT
Start: 2025-02-23 | End: 2025-02-24 | Stop reason: HOSPADM

## 2025-02-23 RX ORDER — NALOXONE HYDROCHLORIDE 0.4 MG/ML
0.4 INJECTION, SOLUTION INTRAMUSCULAR; INTRAVENOUS; SUBCUTANEOUS
Status: DISCONTINUED | OUTPATIENT
Start: 2025-02-23 | End: 2025-02-24 | Stop reason: HOSPADM

## 2025-02-23 RX ORDER — AMLODIPINE BESYLATE 5 MG/1
5 TABLET ORAL DAILY
Status: DISCONTINUED | OUTPATIENT
Start: 2025-02-23 | End: 2025-02-24 | Stop reason: HOSPADM

## 2025-02-23 RX ORDER — OXYCODONE HYDROCHLORIDE 5 MG/1
5 TABLET ORAL EVERY 4 HOURS PRN
Status: DISCONTINUED | OUTPATIENT
Start: 2025-02-23 | End: 2025-02-24 | Stop reason: HOSPADM

## 2025-02-23 RX ORDER — LIDOCAINE 40 MG/G
CREAM TOPICAL
Status: DISCONTINUED | OUTPATIENT
Start: 2025-02-23 | End: 2025-02-24 | Stop reason: HOSPADM

## 2025-02-23 RX ORDER — ONDANSETRON 2 MG/ML
4 INJECTION INTRAMUSCULAR; INTRAVENOUS EVERY 6 HOURS PRN
Status: DISCONTINUED | OUTPATIENT
Start: 2025-02-23 | End: 2025-02-24 | Stop reason: HOSPADM

## 2025-02-23 RX ORDER — FOLIC ACID 1 MG/1
1 TABLET ORAL DAILY
Status: DISCONTINUED | OUTPATIENT
Start: 2025-02-24 | End: 2025-02-24 | Stop reason: HOSPADM

## 2025-02-23 RX ORDER — LORAZEPAM 2 MG/ML
INJECTION INTRAMUSCULAR
Status: COMPLETED
Start: 2025-02-23 | End: 2025-02-23

## 2025-02-23 RX ORDER — GADOBUTROL 604.72 MG/ML
9 INJECTION INTRAVENOUS ONCE
Status: COMPLETED | OUTPATIENT
Start: 2025-02-23 | End: 2025-02-23

## 2025-02-23 RX ORDER — SALIVA STIMULANT COMB. NO.3
2 SPRAY, NON-AEROSOL (ML) MUCOUS MEMBRANE
Status: DISCONTINUED | OUTPATIENT
Start: 2025-02-23 | End: 2025-02-24 | Stop reason: HOSPADM

## 2025-02-23 RX ORDER — PROCHLORPERAZINE MALEATE 10 MG
10 TABLET ORAL EVERY 6 HOURS PRN
Status: DISCONTINUED | OUTPATIENT
Start: 2025-02-23 | End: 2025-02-24 | Stop reason: HOSPADM

## 2025-02-23 RX ORDER — OLANZAPINE 5 MG/1
5-10 TABLET, ORALLY DISINTEGRATING ORAL EVERY 6 HOURS PRN
Status: DISCONTINUED | OUTPATIENT
Start: 2025-02-23 | End: 2025-02-24 | Stop reason: HOSPADM

## 2025-02-23 RX ORDER — ACETAMINOPHEN 650 MG/1
650 SUPPOSITORY RECTAL EVERY 4 HOURS PRN
Status: DISCONTINUED | OUTPATIENT
Start: 2025-02-23 | End: 2025-02-24 | Stop reason: HOSPADM

## 2025-02-23 RX ORDER — AMOXICILLIN 250 MG
2 CAPSULE ORAL 2 TIMES DAILY PRN
Status: DISCONTINUED | OUTPATIENT
Start: 2025-02-23 | End: 2025-02-24 | Stop reason: HOSPADM

## 2025-02-23 RX ORDER — LORAZEPAM 2 MG/ML
2 INJECTION INTRAMUSCULAR EVERY 4 HOURS PRN
Status: DISCONTINUED | OUTPATIENT
Start: 2025-02-23 | End: 2025-02-24 | Stop reason: HOSPADM

## 2025-02-23 RX ORDER — FLUMAZENIL 0.1 MG/ML
0.2 INJECTION, SOLUTION INTRAVENOUS
Status: DISCONTINUED | OUTPATIENT
Start: 2025-02-23 | End: 2025-02-23

## 2025-02-23 RX ORDER — ACETAMINOPHEN 325 MG/1
650 TABLET ORAL EVERY 4 HOURS PRN
Status: DISCONTINUED | OUTPATIENT
Start: 2025-02-23 | End: 2025-02-24 | Stop reason: HOSPADM

## 2025-02-23 RX ORDER — NALOXONE HYDROCHLORIDE 0.4 MG/ML
0.2 INJECTION, SOLUTION INTRAMUSCULAR; INTRAVENOUS; SUBCUTANEOUS
Status: DISCONTINUED | OUTPATIENT
Start: 2025-02-23 | End: 2025-02-24 | Stop reason: HOSPADM

## 2025-02-23 RX ORDER — MULTIPLE VITAMINS W/ MINERALS TAB 9MG-400MCG
1 TAB ORAL DAILY
Status: DISCONTINUED | OUTPATIENT
Start: 2025-02-24 | End: 2025-02-24 | Stop reason: HOSPADM

## 2025-02-23 RX ORDER — ONDANSETRON 4 MG/1
4 TABLET, ORALLY DISINTEGRATING ORAL EVERY 6 HOURS PRN
Status: DISCONTINUED | OUTPATIENT
Start: 2025-02-23 | End: 2025-02-24 | Stop reason: HOSPADM

## 2025-02-23 RX ORDER — POLYETHYLENE GLYCOL 3350 17 G/17G
17 POWDER, FOR SOLUTION ORAL 2 TIMES DAILY PRN
Status: DISCONTINUED | OUTPATIENT
Start: 2025-02-23 | End: 2025-02-24 | Stop reason: HOSPADM

## 2025-02-23 RX ORDER — THIAMINE HYDROCHLORIDE 100 MG/ML
100 INJECTION, SOLUTION INTRAMUSCULAR; INTRAVENOUS ONCE
Status: COMPLETED | OUTPATIENT
Start: 2025-02-23 | End: 2025-02-23

## 2025-02-23 RX ORDER — AMOXICILLIN 250 MG
1 CAPSULE ORAL 2 TIMES DAILY PRN
Status: DISCONTINUED | OUTPATIENT
Start: 2025-02-23 | End: 2025-02-24 | Stop reason: HOSPADM

## 2025-02-23 RX ORDER — LEVETIRACETAM 10 MG/ML
1000 INJECTION INTRAVASCULAR ONCE
Status: DISCONTINUED | OUTPATIENT
Start: 2025-02-23 | End: 2025-02-23

## 2025-02-23 RX ORDER — FOLIC ACID 5 MG/ML
1 INJECTION, SOLUTION INTRAMUSCULAR; INTRAVENOUS; SUBCUTANEOUS ONCE
Status: COMPLETED | OUTPATIENT
Start: 2025-02-23 | End: 2025-02-23

## 2025-02-23 RX ORDER — FLUMAZENIL 0.1 MG/ML
0.2 INJECTION, SOLUTION INTRAVENOUS
Status: DISCONTINUED | OUTPATIENT
Start: 2025-02-23 | End: 2025-02-24 | Stop reason: HOSPADM

## 2025-02-23 RX ADMIN — POTASSIUM & SODIUM PHOSPHATES POWDER PACK 280-160-250 MG 2 PACKET: 280-160-250 PACK at 16:15

## 2025-02-23 RX ADMIN — FOLIC ACID 1 MG: 5 INJECTION, SOLUTION INTRAMUSCULAR; INTRAVENOUS; SUBCUTANEOUS at 08:08

## 2025-02-23 RX ADMIN — GADOBUTROL 9 ML: 604.72 INJECTION INTRAVENOUS at 13:31

## 2025-02-23 RX ADMIN — POTASSIUM & SODIUM PHOSPHATES POWDER PACK 280-160-250 MG 2 PACKET: 280-160-250 PACK at 11:50

## 2025-02-23 RX ADMIN — SODIUM CHLORIDE, SODIUM LACTATE, POTASSIUM CHLORIDE, AND CALCIUM CHLORIDE 1000 ML: .6; .31; .03; .02 INJECTION, SOLUTION INTRAVENOUS at 10:57

## 2025-02-23 RX ADMIN — AMLODIPINE BESYLATE 5 MG: 5 TABLET ORAL at 11:51

## 2025-02-23 RX ADMIN — SODIUM CHLORIDE, SODIUM LACTATE, POTASSIUM CHLORIDE, AND CALCIUM CHLORIDE 1000 ML: .6; .31; .03; .02 INJECTION, SOLUTION INTRAVENOUS at 02:19

## 2025-02-23 RX ADMIN — THIAMINE HYDROCHLORIDE 100 MG: 100 INJECTION, SOLUTION INTRAMUSCULAR; INTRAVENOUS at 06:57

## 2025-02-23 RX ADMIN — POTASSIUM CHLORIDE 40 MEQ: 1500 TABLET, EXTENDED RELEASE ORAL at 11:51

## 2025-02-23 RX ADMIN — LEVETIRACETAM 500 MG: 500 TABLET, FILM COATED ORAL at 21:26

## 2025-02-23 RX ADMIN — POTASSIUM CHLORIDE 40 MEQ: 1500 TABLET, EXTENDED RELEASE ORAL at 03:04

## 2025-02-23 RX ADMIN — LORAZEPAM 2 MG: 2 INJECTION INTRAMUSCULAR; INTRAVENOUS at 06:04

## 2025-02-23 RX ADMIN — POTASSIUM & SODIUM PHOSPHATES POWDER PACK 280-160-250 MG 2 PACKET: 280-160-250 PACK at 18:40

## 2025-02-23 RX ADMIN — Medication 1 TABLET: at 06:58

## 2025-02-23 ASSESSMENT — ACTIVITIES OF DAILY LIVING (ADL)
ADLS_ACUITY_SCORE: 41
ADLS_ACUITY_SCORE: 49
ADLS_ACUITY_SCORE: 41
ADLS_ACUITY_SCORE: 49
ADLS_ACUITY_SCORE: 41
ADLS_ACUITY_SCORE: 49
ADLS_ACUITY_SCORE: 41
ADLS_ACUITY_SCORE: 49
ADLS_ACUITY_SCORE: 41
ADLS_ACUITY_SCORE: 49
ADLS_ACUITY_SCORE: 41

## 2025-02-23 NOTE — ED NOTES
[facility]  ED Nurse Handoff Report    ED Chief complaint: Syncope (Post ictal)      ED Diagnosis:   Final diagnoses:   None       Code Status: Inpatient team to discuss code status with patient    Allergies:   Allergies   Allergen Reactions    Lisinopril Angioedema    Losartan Angioedema       Patient Story:       BIBA due to ? Post ictal / syncopal episode. Per EMS, Wife found pt on floor, called 911. Initally seen by police stated he's breathing was agonal, per EMS pt looked post ictal, eye wide open / obtunded, some confusion. Hx of syncopal episode ? Dx with vasovagal. No hx of szrs. + alcohol breath. -130 per EMS. . No HI, No pain         Focused Assessment:   Abnormal Labs Resulted from Time of ED Arrival to Time of ED Departure   COMPREHENSIVE METABOLIC PANEL - Abnormal       Result Value    Sodium 126 (*)     Potassium 2.9 (*)     Carbon Dioxide (CO2) 21 (*)     Anion Gap 21 (*)     Urea Nitrogen 11.6      Creatinine 0.80      GFR Estimate >90      Calcium 9.0      Chloride 84 (*)     Glucose 101 (*)     Alkaline Phosphatase 107      AST 87 (*)      (*)     Protein Total 7.6      Albumin 4.3      Bilirubin Total 0.8     ETHYL ALCOHOL LEVEL - Abnormal    Alcohol ethyl 0.04 (*)    ISTAT GASES LACTATE VENOUS POCT - Abnormal    Lactic Acid POCT 6.1 (*)     Bicarbonate Venous POCT 25      O2 Sat, Venous POCT 76 (*)     pCO2 Venous POCT 39 (*)     pH Venous POCT 7.40      pO2 Venous POCT 41      Base Excess/Deficit (+/-) POCT 0.0     ISTAT GASES LACTATE VENOUS POCT - Abnormal    Lactic Acid POCT 2.5 (*)     Bicarbonate Venous POCT 30 (*)     O2 Sat, Venous POCT 83 (*)     pCO2 Venous POCT 41      pH Venous POCT 7.47 (*)     pO2 Venous POCT 44      Base Excess/Deficit (+/-) POCT 6.0 (*)        XR Chest 2 Views   Final Result   IMPRESSION: PA and lateral views of the chest were obtained. Cardiomediastinal silhouette is within normal limits. Mild basilar pulmonary opacities,  likely atelectasis. No significant pleural effusion or pneumothorax.      CT Head w/o Contrast   Final Result   IMPRESSION:   1.  No acute intracranial process.             Treatments and/or interventions provided:   Medications   lactated ringers BOLUS 1,000 mL (0 mLs Intravenous Stopped 2/23/25 0429)   potassium chloride ivette ER (KLOR-CON M20) CR tablet 40 mEq (40 mEq Oral $Given 2/23/25 0304)   LORazepam (ATIVAN) 2 MG/ML injection (2 mg  $Given 2/23/25 0604)       Patient's response to treatments and/or interventions: Improving, but pt had seizure episode    To be done/followed up on inpatient unit:  Follow Plan of Care    Does this patient have any cognitive concerns?:  Baseline alert, oriented x 4, sedated now post ativan    Activity level - Baseline/Home:  Independent  Activity Level - Current:   Independent / sedated    Patient's Preferred language: English   Needed?: No    Isolation: None  Infection: Not Applicable  Patient tested for COVID 19 prior to admission: NO  Bariatric?: No    Vital Signs:   Vitals:    02/23/25 0205 02/23/25 0300 02/23/25 0400 02/23/25 0500   BP:  134/86 125/77 (!) 117/96   Pulse: 101 86 81 89   Resp: 20 10 (!) 9 11   Temp: 99.1  F (37.3  C)      TempSrc: Oral      SpO2: 95% 98% 97% 97%       Cardiac Rhythm:     Was the PSS-3 completed:   Yes  What interventions are required if any?               Family Comments: Family at Bedside  OBS brochure/video discussed/provided to patient/family: N/A    For the majority of the shift this patient's behavior was Green.   Behavioral interventions performed were None.    ED NURSE PHONE NUMBER: *[phone]

## 2025-02-23 NOTE — PROGRESS NOTES
None billing note-patient admitted earlier today by Dr. Juarez for evaluation of seizures.  He had a seizure at home and another seizure in ER; it seems that in January he had an episode of loss of consciousness that might have been a seizure as well.  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 Guan  -Defer to neurology if he needs to be started on Keppra  -CIWA protocol with Valium as needed  -CD consult  -[facility] in a.m.  -Discussed with his wife AntoniaBella Martinez, Hospitalist

## 2025-02-23 NOTE — PHARMACY-ADMISSION MEDICATION HISTORY
Pharmacist Admission Medication History    Admission medication history is complete. The information provided in this note is only as accurate as the sources available at the time of the update.    Information Source(s): Patient, Family member, and CareEverywhere/SureScripts via in-person    Pertinent Information: N/A    Changes made to PTA medication list:  Added: None  Deleted: Qvar inhaler  Changed: Cetirizine (every day to every day prn)    Allergies reviewed with patient and updates made in EHR: yes    Medication History Completed By: Faizan Ivey RP 2/23/2025 7:25 AM    PTA Med List   Medication Sig Last Dose/Taking    albuterol (PROAIR HFA/PROVENTIL HFA/VENTOLIN HFA) 108 (90 Base) MCG/ACT inhaler Inhale 2 puffs into the lungs every 6 hours as needed for shortness of breath / dyspnea or wheezing Unknown    amLODIPine (NORVASC) 5 MG tablet Take 1 tablet (5 mg) by mouth daily. 2/22/2025 Morning    cetirizine (ZYRTEC) 10 MG tablet Take 10 mg by mouth daily as needed for allergies. Unknown    chlorthalidone (HYGROTON) 25 MG tablet TAKE 1 TABLET BY MOUTH DAILY 2/22/2025 Morning    rosuvastatin (CRESTOR) 10 MG tablet Take 1 tablet (10 mg) by mouth daily. 2/22/2025 Evening

## 2025-02-23 NOTE — PROGRESS NOTES
RECEIVING UNIT ED HANDOFF REVIEW    ED Nurse Handoff Report was reviewed by: Sukhwinder Johnson RN on February 23, 2025 at 8:36 AM

## 2025-02-23 NOTE — CONSULTS
"              Neurology Consultation    Yimi Gibbs MRN# [id number]   YOB: 1965 Age: 59 year old    Code Status:Full Code   Date of Admission: 2/23/2025  Date of Consult:02/23/2025                                                                                       Assessment and Plan:                                         #.  Seizure-agree history is highly suggestive of alcohol withdrawal given associated electrolyte abnormalities and elevated liver function test/history of fatty liver  -- Agree with MRI and EEG  Will review results as available and make recommendations about anticonvulsant therapy when available  Check vitamin B-12 and TSH  #.  Probable alcohol withdrawal potential  -- Management per protocol per hospitalist service  #Hoarseness/dysphonia: Reports diagnosed with \"vocal cord dysfunction\" at [facility] 25 years ago, chart list spasmodic dysphonia as diagnosis    [place] guidelines: 3-month driving restriction given the witnessed seizure today.  Guidelines also recommend report seizure occurrence      Later reviewed MRI brain- minimal atrophy, sinus inflammation, no cause for sz  EEG revealed left temp sharp waves.    Latest Reference Range & Units 02/23/25 09:24   TSH 0.30 - 4.20 uIU/mL 1.05   Vitamin B12 232 - 1,245 pg/mL 559     Recommend levetiracetam 500mg bid.   Follow with me in 3months.      Reviewed with patient by telephone.    If stable overnight, reasonable to discharge.        If questions, Dr. Mckeon covering for General Neurology service in week ahead.            ----------------------------------------------------------------------------------  ----------------------------------------------------------------------------------  Reason for consult: as above       Chief Complaint:   Seizure           History of Present Illness:   This patient is a 59 year old male who presents with seizure witnessed by wife at home.  An additional seizure was witnessed in " "the emergency room at 6 AM  There was event of syncope?  In January 2025 as well as a possible event in the summer 2024.  There are elevated liver function tests, hyponatremia, correlating history suggestive of alcohol overuse although patient denies.    Reports speech changes occurred 25 years ago was diagnosed with \"vocal cord problem\" at [facility]    MEDICAL DOCUMENTATION REVIEWED:  Very thorough note of Dr. Juarez, hospitalist service  ED provider notes       Past Medical History:     Past Medical History:   Diagnosis Date    Abductor spasmodic dysphonia     Hyperlipidaemia LDL goal < 100     Hypertension          Past Surgical History:     Past Surgical History:   Procedure Laterality Date    BUNIONECTOMY  2010    EXCISE MASS FOOT N/A 2/14/2023    Procedure: Fifth Metatarsal Head Resection Left Foot, debridment of Ulcer;  Surgeon: Kerrie Childs DPM;  Location: [facility] OR    SEPTOPLASTY  2007    THYROPLASTY  2001          Social History:     Social History     Socioeconomic History    Marital status:      Spouse name: Antonia    Number of children: 2   Occupational History    Occupation:      Employer: [employer]   Tobacco Use    Smoking status: Never    Smokeless tobacco: Never   Vaping Use    Vaping status: Never Used   Substance and Sexual Activity    Alcohol use: Yes     Comment: socially    Drug use: No   Social History Narrative    Lives with wife, 2 kids, dog.     Son (Bret) and daughter (Latasha)    Work: supervisor [employer]    Exercise: walking    Alcohol: social     Social Drivers of Health     Financial Resource Strain: Low Risk  (6/18/2024)    Financial Resource Strain     Within the past 12 months, have you or your family members you live with been unable to get utilities (heat, electricity) when it was really needed?: No   Recent Concern: Financial Resource Strain - High Risk (6/10/2024)    Financial Resource Strain     Within the past 12 months, have you or your family members you " live with been unable to get utilities (heat, electricity) when it was really needed?: Yes   Food Insecurity: Low Risk  (6/18/2024)    Food Insecurity     Within the past 12 months, did you worry that your food would run out before you got money to buy more?: No     Within the past 12 months, did the food you bought just not last and you didn t have money to get more?: No   Transportation Needs: Low Risk  (6/18/2024)    Transportation Needs     Within the past 12 months, has lack of transportation kept you from medical appointments, getting your medicines, non-medical meetings or appointments, work, or from getting things that you need?: No   Physical Activity: Sufficiently Active (6/10/2024)    Exercise Vital Sign     Days of Exercise per Week: 4 days     Minutes of Exercise per Session: 60 min   Stress: No Stress Concern Present (6/10/2024)    [place] [employer] - Occupational Stress Questionnaire     Feeling of Stress : Not at all   Social Connections: Unknown (6/10/2024)    Social Connection and Isolation Panel [NHANES]     Frequency of Social Gatherings with Friends and Family: Once a week   Housing Stability: Low Risk  (6/18/2024)    Housing Stability     Do you have housing? : Yes     Are you worried about losing your housing?: No     Alcohol use-bruises on beer-inconsistent information about alcohol use.      Family History:     Family History   Problem Relation Age of Onset    Prostate Cancer Mother      Reviewed and not felt to be contributory.  Noncontributory       Home Medications:     Prior to Admission Medications   Prescriptions Last Dose Informant Patient Reported? Taking?   albuterol (PROAIR HFA/PROVENTIL HFA/VENTOLIN HFA) 108 (90 Base) MCG/ACT inhaler Unknown  No Yes   Sig: Inhale 2 puffs into the lungs every 6 hours as needed for shortness of breath / dyspnea or wheezing   amLODIPine (NORVASC) 5 MG tablet 2/22/2025 Morning  No Yes   Sig: Take 1 tablet (5 mg) by mouth daily.    cetirizine (ZYRTEC) 10 MG tablet Unknown  Yes Yes   Sig: Take 10 mg by mouth daily as needed for allergies.   chlorthalidone (HYGROTON) 25 MG tablet 2/22/2025 Morning  No Yes   Sig: TAKE 1 TABLET BY MOUTH DAILY   rosuvastatin (CRESTOR) 10 MG tablet 2/22/2025 Evening  No Yes   Sig: Take 1 tablet (10 mg) by mouth daily.      Facility-Administered Medications: None          Allergy:     Allergies   Allergen Reactions    Lisinopril Angioedema    Losartan Angioedema          Inpatient Medications:   Scheduled Meds:  Current Facility-Administered Medications   Medication Dose Route Frequency Provider Last Rate Last Admin    amLODIPine (NORVASC) tablet 5 mg  5 mg Oral Daily Juarez, Luis Daniel Ellison MD        [START ON 2/24/2025] folic acid (FOLVITE) tablet 1 mg  1 mg Oral Daily Juarez, Luis Daniel Ellison MD        lactated ringers BOLUS 1,000 mL  1,000 mL Intravenous Once Juarez, Luis Daniel Ellison MD 1,000 mL/hr at 02/23/25 1057 1,000 mL at 02/23/25 1057    [START ON 2/24/2025] multivitamin w/minerals (THERA-VIT-M) tablet 1 tablet  1 tablet Oral Daily Juarez, Luis Daniel Ellison MD        potassium & sodium phosphates (NEUTRA-PHOS) Packet 2 packet  2 packet Oral or Feeding Tube Q4H Samira Martinez MD        potassium chloride ivette ER (KLOR-CON M20) CR tablet 40 mEq  40 mEq Oral Once Samira Martinez MD        sodium chloride (PF) 0.9% PF flush 3 mL  3 mL Intracatheter Q8H Juarez, Luis Daniel Ellison MD   3 mL at 02/23/25 1057    [START ON 2/24/2025] thiamine (B-1) tablet 100 mg  100 mg Oral Daily Juarez, Luis Daniel Ellison MD         PRN Meds:   Current Facility-Administered Medications   Medication Dose Route Frequency Provider Last Rate Last Admin    acetaminophen (TYLENOL) tablet 650 mg  650 mg Oral Q4H PRN Larry, Luis Daniel Ellison MD        Or    acetaminophen (TYLENOL) Suppository 650 mg  650 mg Rectal Q4H PRN Larry, Luis Daniel Ellison MD        artificial saliva (BIOTENE MT) solution 2 spray  2 spray Mouth/Throat Q1H PRN Larry, Luis Daniel Ellison MD        bisacodyl  (DULCOLAX) suppository 10 mg  10 mg Rectal Daily PRN Juarez, Luis Daniel Ellison MD        calcium carbonate (TUMS) chewable tablet 1,000 mg  1,000 mg Oral 4x Daily PRN Juarez, Luis Daniel Ellison MD        diazepam (VALIUM) tablet 10 mg  10 mg Oral Q30 Min PRN Juarez, Luis Daniel Ellison MD        Or    diazepam (VALIUM) injection 5-10 mg  5-10 mg Intravenous Q30 Min PRN Juarez, Luis Daniel Ellison MD        flumazenil (ROMAZICON) injection 0.2 mg  0.2 mg Intravenous q1 min prn Juarez, Luis Daniel Ellison MD        OLANZapine zydis (zyPREXA) ODT tab 5-10 mg  5-10 mg Oral Q6H PRN Juarez, Luis Daniel Ellison MD        Or    haloperidol lactate (HALDOL) injection 2.5-5 mg  2.5-5 mg Intravenous Q6H PRN Juarez, Luis Daniel Ellison MD        lidocaine (LMX4) cream   Topical Q1H PRN Juarez, Luis Daniel Ellison MD        lidocaine 1 % 0.1-1 mL  0.1-1 mL Other Q1H PRN Juarez, Luis Daniel Ellison MD        LORazepam (ATIVAN) injection 2 mg  2 mg Intravenous Q4H PRN Juarez, Luis Daniel Ellison MD        melatonin tablet 5 mg  5 mg Oral QPM PRN Juarez, Luis Daniel Ellison MD        naloxone (NARCAN) injection 0.2 mg  0.2 mg Intravenous Q2 Min PRN Samira Martinez MD        Or    naloxone (NARCAN) injection 0.4 mg  0.4 mg Intravenous Q2 Min PRN Samira Martinez MD        Or    naloxone (NARCAN) injection 0.2 mg  0.2 mg Intramuscular Q2 Min PRN Samira Martinez MD        Or    naloxone (NARCAN) injection 0.4 mg  0.4 mg Intramuscular Q2 Min PRN Samira Martinez MD        ondansetron (ZOFRAN ODT) ODT tab 4 mg  4 mg Oral Q6H PRN Juarez, Luis Daniel Ellison MD        Or    ondansetron (ZOFRAN) injection 4 mg  4 mg Intravenous Q6H PRN Juarez, Luis Daniel Ellison MD        oxyCODONE (ROXICODONE) tablet 5 mg  5 mg Oral Q4H PRN Juarez, Luis Daniel Ellison MD        oxyCODONE IR (ROXICODONE) half-tab 2.5 mg  2.5 mg Oral Q4H PRN Larry, Luis Daniel Ellison MD        polyethylene glycol (MIRALAX) Packet 17 g  17 g Oral BID PRN Larry, Luis Daniel Ellison MD        prochlorperazine (COMPAZINE) injection 10 mg  10 mg Intravenous Q6H PRN Luis Daniel Juarez MD      "   Or    prochlorperazine (COMPAZINE) tablet 10 mg  10 mg Oral Q6H PRN Luis Daniel Juarez MD        senna-docusate (SENOKOT-S/PERICOLACE) 8.6-50 MG per tablet 1 tablet  1 tablet Oral BID PRN Luis Daniel Juarez MD        Or    senna-docusate (SENOKOT-S/PERICOLACE) 8.6-50 MG per tablet 2 tablet  2 tablet Oral BID PRN Luis Daniel Juarez MD        sodium chloride (PF) 0.9% PF flush 3 mL  3 mL Intracatheter q1 min prn Luis Daniel Juarez MD                 Physical Exam:   Physical Exam   Vitals:  Height:6' 2\"  Weight:198 lbs 3.2 oz   Temp: 98.4  F (36.9  C) Temp src: Oral BP: (!) 151/98 Pulse: 99   Resp: 16 SpO2: 97 % O2 Device: None (Room air)    General Appearance: Anxious, normal body habitus, moderate tremor  Neuro Exam:  Mental Status Exam: Alert and oriented. Language intact, hoarse speech. Fund of knowledge normal.  Cranial Nerves: Vision/visual fields intact to finger counting. Pupils are equal and reactive to light. Extraocular movements intact. Facial strength and sensation is normal. No jaw or tongue deviation.  Motor: Motor tone and bulk are intact in extremities.  Strength intact x 4  Reflexes: Symmetrically intact. Plantar signs normal.  Sensory: Vibration and cold intact in all 4 extremities.  Coordination: Coordination intact.  Gait: Not tested  Neck: No nuchal rigidity  Extremities: No clubbing, no cyanosis, no edema          Data:   ROUTINE IP LABS   CBC RESULTS:     Recent Labs   Lab 02/23/25  0209   WBC 5.3   RBC 4.45   HGB 14.5   HCT 40.0        Basic Metabolic Panel:   Recent Labs   Lab Test 02/23/25  0924 02/23/25  0209 01/09/25  0530 06/11/24  0838   NA  --  126* 129* 133*   POTASSIUM 3.1* 2.9* 3.8 3.5   CHLORIDE  --  84* 87* 91*   CO2  --  21* 24 30*   BUN  --  11.6 12.0 12.5   CR  --  0.80 0.84 0.76   GLC  --  101* 155* 93   THANG  --  9.0 9.5 9.3     Liver panel:  Recent Labs   Lab Test 02/23/25  0209 01/09/25  0530 06/11/24  0838 10/12/20  1113 09/10/20  0854   PROTTOTAL 7.6 8.0 8.3 7.5 " 7.7   ALBUMIN 4.3 4.5 4.7 4.7 4.9   BILITOTAL 0.8 1.0 1.9* 0.9 1.1   ALKPHOS 107 98 91 103 81   AST 87* 99* 107* 104* 104*   * 118* 125* 138* 143*   BILIDIRECT  --   --   --  0.20  --      Lipid Profile:  Recent Labs   Lab Test 06/11/24  0000 02/09/23  1435 09/10/20  0854 05/15/19  1504 03/06/17  1345   CHOL 133 245* 214* 214* 227*   HDL 38* 35* 41 53 63   LDL 85 193* 154* 144* 145*   TRIG 51 83 105 86 96            IMAGING:   Independent interpretation of the following studies by myself as part of today's encounter.  No acute abnormality  CT head:   CT HEAD W/O CONTRAST  LOCATION: [facility]  DATE: 2/23/2025     INDICATION: possible seizure  COMPARISON: None.  TECHNIQUE: Routine CT Head without IV contrast. Multiplanar reformats. Dose reduction techniques were used.     FINDINGS:  INTRACRANIAL CONTENTS: No intracranial hemorrhage, extraaxial collection, or mass effect.  No CT evidence of acute infarct. Normal parenchymal attenuation. Normal ventricles and sulci.      VISUALIZED ORBITS/SINUSES/MASTOIDS: No intraorbital abnormality. Moderate mucosal thickening scattered about the paranasal sinuses. No middle ear or mastoid effusion.     BONES/SOFT TISSUES: No acute abnormality.                                                                      IMPRESSION:  1.  No acute intracranial process.     MR BRAIN W/O and W CONTRAST  LOCATION: [facility]  DATE: 2/23/2025     INDICATION: New diagnosis of seizures.  COMPARISON: CT head dated 2/23/2025  CONTRAST: 9mL Gadavist  TECHNIQUE: Routine multiplanar multisequence head MRI without and with intravenous contrast.     FINDINGS:  INTRACRANIAL CONTENTS: No acute or subacute infarct. No mass, acute hemorrhage, or extra-axial fluid collections. Normal brain parenchymal signal for patient age. Mild generalized cerebral atrophy. No hydrocephalus. Normal position of the cerebellar   tonsils. No pathologic contrast  enhancement.     SELLA: Not well assessed on this examination.     OSSEOUS STRUCTURES/SOFT TISSUES: Normal marrow signal. The major intracranial vascular flow voids are maintained.      ORBITS: No abnormality accounting for technique.      SINUSES/MASTOIDS: Moderate mucosal thickening scattered about the paranasal sinuses. No middle ear or mastoid effusion.                                                                       IMPRESSION:  1.  No acute intracranial process.  2.  Mild generalized cerebral atrophy.  3.  Moderate mucosal thickening scattered about the paranasal sinuses    Daria Guan M.D.  [facility], [facility].  Office [phone]

## 2025-02-23 NOTE — ED PROVIDER NOTES
Emergency Department Note      History of Present Illness     Chief Complaint  Syncope (Post ictal)    HPI  Yimi Gibbs is a 59 year old male with history of hypertension who presents to the ED via EMS for evaluation of syncope. He reports getting up to go to the bathroom when he suddenly passed out. He had a similar episode a month ago. No palpitations or dizziness before the episode. He felt normal earlier. Denies any pain, headache, chest pain, abdominal pain, fever, or diarrhea. Patient currently feels fine. According to EMS, patient looked post ictal, eyes wide open, and had some confusion. His heart rate per EMS was 125-130 and blood sugar was 102.     His wife reports patient got up to go to bathroom and came back into the bed. Patient began to have a shaking episode that lasted 30 seconds to a minute. He seemed confusion afterwards. She denies patient going onto the ground or floor.     Independent Historian  Wife and EMS as detailed above.      Past Medical History   Medical History and Problem List   Abductor spasmodic dysphonia  Hyperlipidemia   Hypertension  Hepatic fibrosis  Hepatic steatosis   Ulnar neuropathy of left upper extremity   Chronic allergic bronchitis     Medications   Albuterol inhaler  Norvasc  Hygroton  Crestor    Surgical History   Bunionectomy  Excise mass foot   Septoplasty  Thyroplasty   Physical Exam   Patient Vitals for the past 24 hrs:   BP Temp Temp src Pulse Resp SpO2   02/23/25 0630 138/84 -- -- 100 12 98 %   02/23/25 0500 (!) 117/96 -- -- 89 11 97 %   02/23/25 0400 125/77 -- -- 81 (!) 9 97 %   02/23/25 0300 134/86 -- -- 86 10 98 %   02/23/25 0205 -- 99.1  F (37.3  C) Oral 101 20 95 %   02/23/25 0157 (!) 138/92 -- -- 104 -- --     Physical Exam  General: Does not appear in acute distress  Head: No signs of trauma.   Mouth/Throat: Oropharynx is clear and moist.   Eyes: Conjunctivae are normal.   Neck: Normal range of motion. No nuchal rigidity.   CV: Normal rate and  regular rhythm.    Resp: Effort normal and breath sounds normal. No respiratory distress.   GI: Soft. There is no tenderness.  No rebound or guarding.  Normal bowel sounds.  No CVA tenderness.  MSK: Normal range of motion. no edema. No Calf tenderness.  Neuro: The patient is alert and oriented. Speech normal.  Skin: Skin is warm and dry. No rash noted.   Psych: normal mood and affect. behavior is normal.       Diagnostics   Lab Results   Labs Ordered and Resulted from Time of ED Arrival to Time of ED Departure   COMPREHENSIVE METABOLIC PANEL - Abnormal       Result Value    Sodium 126 (*)     Potassium 2.9 (*)     Carbon Dioxide (CO2) 21 (*)     Anion Gap 21 (*)     Urea Nitrogen 11.6      Creatinine 0.80      GFR Estimate >90      Calcium 9.0      Chloride 84 (*)     Glucose 101 (*)     Alkaline Phosphatase 107      AST 87 (*)      (*)     Protein Total 7.6      Albumin 4.3      Bilirubin Total 0.8     ETHYL ALCOHOL LEVEL - Abnormal    Alcohol ethyl 0.04 (*)    ISTAT GASES LACTATE VENOUS POCT - Abnormal    Lactic Acid POCT 6.1 (*)     Bicarbonate Venous POCT 25      O2 Sat, Venous POCT 76 (*)     pCO2 Venous POCT 39 (*)     pH Venous POCT 7.40      pO2 Venous POCT 41      Base Excess/Deficit (+/-) POCT 0.0     ISTAT GASES LACTATE VENOUS POCT - Abnormal    Lactic Acid POCT 2.5 (*)     Bicarbonate Venous POCT 30 (*)     O2 Sat, Venous POCT 83 (*)     pCO2 Venous POCT 41      pH Venous POCT 7.47 (*)     pO2 Venous POCT 44      Base Excess/Deficit (+/-) POCT 6.0 (*)    LACTIC ACID WHOLE BLOOD - Abnormal    Lactic Acid 14.9 (*)    TROPONIN T, HIGH SENSITIVITY - Normal    Troponin T, High Sensitivity <6     D DIMER QUANTITATIVE - Normal    D-Dimer Quantitative <0.27     CBC WITH PLATELETS AND DIFFERENTIAL    WBC Count 5.3      RBC Count 4.45      Hemoglobin 14.5      Hematocrit 40.0      MCV 90      MCH 32.6      MCHC 36.3      RDW 11.3      Platelet Count 176      % Neutrophils 44      % Lymphocytes 36      %  Monocytes 10      % Eosinophils 8      % Basophils 1      % Immature Granulocytes 0      NRBCs per 100 WBC 0      Absolute Neutrophils 2.4      Absolute Lymphocytes 1.9      Absolute Monocytes 0.6      Absolute Eosinophils 0.4      Absolute Basophils 0.0      Absolute Immature Granulocytes 0.0      Absolute NRBCs 0.0     LACTIC ACID WHOLE BLOOD     Imaging  XR Chest 2 Views   Final Result   IMPRESSION: PA and lateral views of the chest were obtained. Cardiomediastinal silhouette is within normal limits. Mild basilar pulmonary opacities, likely atelectasis. No significant pleural effusion or pneumothorax.      CT Head w/o Contrast   Final Result   IMPRESSION:   1.  No acute intracranial process.           Report per radiology    EKG   ECG results from 02/23/25   EKG 12-lead, tracing only     Value    Systolic Blood Pressure     Diastolic Blood Pressure     Ventricular Rate 100    Atrial Rate 100    CO Interval 160    QRS Duration 96        QTc 464    P Axis 42    R AXIS -69    T Axis 14    Interpretation ECG      Sinus rhythm  Possible Left atrial enlargement  Left axis deviation  Incomplete right bundle branch block  Inferior infarct , age undetermined  Abnormal ECG  When compared with ECG of 09-Jan-2025 05:20,  No significant change was found  Read at 0205       Independent Interpretation  On my independent interpretation of head CT there is no large ICH noted   On my independent interpretation of CXR there is no pneumothorax      ED Course    Medications Administered  Medications   folic acid injection 1 mg (has no administration in time range)   lactated ringers BOLUS 1,000 mL (0 mLs Intravenous Stopped 2/23/25 0429)   potassium chloride ivette ER (KLOR-CON M20) CR tablet 40 mEq (40 mEq Oral $Given 2/23/25 0304)   LORazepam (ATIVAN) 2 MG/ML injection (2 mg  $Given 2/23/25 0604)   multivitamin w/minerals (THERA-VIT-M) tablet 1 tablet (1 tablet Oral $Given 2/23/25 0658)   thiamine (B-1) injection 100 mg (100 mg  Intravenous $Given 2/23/25 0657)     Procedures  Procedures     Discussion of Management  Admitting Hospitalist, Dr. Juarez  Neurology, Dr. Martinez      ED Course  ED Course as of 02/23/25 0713   Sun Feb 23, 2025   0201 I obtained history and examined the patient as noted above.    0231 I rechecked the patient and explained findings. I spoke with his wife for more history as noted above.   0423 I rechecked the patient and explained findings.    0430 I rechecked the patient and explained findings. Patient is feeling fine but isn't interested of being admitted.   0526 I spoke with Dr. Arthur Martinez with General Neurology about the patient's history and plan of care.     0600 Patient had a witnessed seizure.   0614 I spoke with Dr. Juarez, hospitalist, who agreed to admit the patient.      Medical Decision Making / Diagnosis   CMS Diagnoses: IV Antibiotics given and/or elevated Lactate of 14.9 and no sepsis note found - Delete this reminder and enter the sepsis note or '.edcms' before signing chart.>>>Lactic acid elevated due to seizure. At this time there are no signs of sepsis or septic shock    MIPS     None    [facility]  Yimi Gibbs is a 59 year old male presents after what sounds to be a seizure.  The initial report from EMS was that the patient had gotten up to go to the bathroom and collapsed to the ground.  When the wife arrived, additional history was obtained and the patient had gotten up and went to the bathroom, but had returned to bed and lay down and then had what sounds to be 30-60 second seizure.  On initial evaluation the patient was alert and oriented and neurologically intact.  He had no complaints.  Blood work was obtained that was overall reassuring.  I had sent a lactic acid with additional history which was elevated and this would be consistent with a seizure.  There is no infectious symptoms.  CT of the head did not show any signs of acute process.  Initially the patient very much wanted to  go home and given this was a potential a first-time seizure, I felt that that was likely reasonable with follow-up with neurology as an outpatient.  Unfortunately the patient had a second seizure while in the ER.  With this he was given a dose of Ativan.  In speaking with the wife further during the second seizure, I believe the patient may have had a seizure a few weeks ago when he was seen for syncope based on the similar prodromal type symptoms.  I had discussed alcohol use with the patient and initially he stated he did not drink every day, but then stated that he would have 2-3 beers.  The hospitalist had spoken with the wife further and apparently he goes through a couple of cases of beer per week.  With this history, there is concern that the seizures may be related to alcohol withdrawal.  Patient had not been significantly tachycardic or hypertensive, but certainly alcohol could further predispose the patient to seizure disorder.  Patient was admitted to the hospital service for continued monitoring and further evaluation.    Disposition  The patient was admitted to the hospital.     ICD-10 Codes:    ICD-10-CM    1. Seizure (H)  R56.9       2. Hyponatremia  E87.1            Scribe Disclosure:  I, Ml Vasquez, am serving as a scribe at 2:13 AM on 2/23/2025 to document services personally performed by Albert Quinones MD based on my observations and the provider's statements to me.      Albert Quinones MD  02/23/25 1732

## 2025-02-23 NOTE — PLAN OF CARE
Reason for Admission: witnessed seizures    Cognitive/Mentation: A/Ox 4  Neuros/CMS: Intact, mild tremors and anxiety  VS: VSS on RA except low grade temps.  Tele: NSR.  /GI: Continent. Last BM PTA.   Pulmonary: LS clear.  Pain: denies.     Drains/Lines: PIV SL  Skin: flushed, intact otherwise  Activity: SBA with GB.  Diet: Regular with thin liquids. Takes pills whole with water.     Therapies recs: pending  Discharge: pending    Aggression Stoplight Tool: green    End of shift summary: EEG and MRI complete. CIWAs 3-4 for anxiety, mild tremor and restlessness Phos replaced, recheck at 2300. Potassium replaced recheck at in the AM. Mag recheck in the AM.

## 2025-02-23 NOTE — ED NOTES
Patient ambulated well without assistance to the bathroom. Patient denied any dizziness, lightheadedness, chest pain or shortness of breath while ambulating. Patient placed back on cardiac telemetry, BP, and SpO2 monitoring. Call light within reach.

## 2025-02-23 NOTE — ED NOTES
Bed: ED20  Expected date:   Expected time:   Means of arrival:   Comments:  452 58m AMS, possible post ictal

## 2025-02-23 NOTE — H&P
[facility]    History and Physical - Hospitalist Service       Date of Admission:  2/23/2025    Assessment & Plan      Yimi Gibbs is a 59 year old male with a history of hypertension, fatty liver disease admitted on 2/23/2025. He presents after a shaking episode followed by confusion at home concerning for seizure, witnessed seizure event here in the emergency department.    Seizures: Patient with new diagnosis of seizures.  Suspect alcohol or alcohol withdrawal related.  Episode 1 month ago where he was seen in the emergency department for possible syncope after standing to go to the bathroom, cocking his head in an abnormal position for a moment and not responding to wife before falling.  Suspect this may have been a seizure as well.  -Capnography monitoring  -Seizure precautions  -IV Ativan available for recurrent tonic-clonic event  -General Neurology consulted  -MRI brain with and without contrast ordered and pending  -Discussed with wife self reporting to Department of vehicle services and no driving minimum 3 months seizure-free.  Will need to be discussed with patient when he is more clear from his postictal state and Ativan administration.  -EEG ordered  -Have not initiated Keppra at this time following 2 mg Ativan dose.  Primary suspicion is for alcohol withdrawal seizures    High risk alcohol use: No known history of alcohol withdrawal, but suspect seizure events may be related to alcohol and/or alcohol withdrawal.  He reports 2-3 beers per day, but his wife notes that he has been going to the liquor store about twice per week and buying at least a 24 pack of beer each time he goes.  Fatty liver infiltration previously noted on GI evaluation with known underreporting of alcohol use.  Not currently tremulous or with fasciculations  -Discussed importance of alcohol cessation with wife at bedside  -CIWA protocol with as needed Valium available  -Holding on gabapentin  taper.  There may be some benefit to noting alcohol withdrawal clinically as we work to determine the cause of his seizures.  If clear alcohol withdrawal, would start on high-dose gabapentin taper.  -Thiamine, folate, multivitamin daily.  Will receive his thiamine and folic acid in the emergency department.    Hypochloremic hyponatremia:  Hypokalemia: Suspect secondary to both chlorthalidone use and heavy alcohol use.  -Potassium, magnesium replacement protocols in place  -Holding prior to admission chlorthalidone  -1 L lactated ringer bolus  -Recommend alcohol cessation    Anion gap metabolic acidosis: Secondary to lactic acidosis as below  Lactic acidosis: Secondary to seizure.  Initially elevated and rapidly improved, recheck at 6 AM was after a witnessed seizure event.    LFT elevations: Mildly elevated.  Not particularly helpful here in the setting of seizures.  Suspect related to fatty liver disease, which is known.  Alcohol, obesity contributing. History of LFT elevations for which he has followed with Eugene MORILLO; reported drinking 2 beer/day at that time (July 2024), but wife notes that he likely under reports to providers as he is embarrassed.  -Can trend CMP  -Alcohol cessation  -May be helpful to recheck LFTs after prolonged sobriety  -Note prior recommendation for FibroScan as an outpatient, he declined    Hyperlipidemia:  -Holding statin currently pending repeat LFT    Essential hypertension:  -Can resume prior to admission amlodipine 5 mg daily  -Prior to admission chlorthalidone on hold with hyponatremia, hypokalemia    History of spasmodic dysphonia: Noted in chart review.         Diet:  Regular adult diet as mental status clears  DVT Prophylaxis: Pneumatic Compression Devices  Gomez Catheter: Not present  Lines: None     Cardiac Monitoring: None  Code Status:  Full code.  Confirmed with wife.  Patient does not have capacity for this decision currently    Clinically Significant Risk Factors Present on  Admission        # Hypokalemia: Lowest K = 2.9 mmol/L in last 2 days, will replace as needed  # Hyponatremia: Lowest Na = 126 mmol/L in last 2 days, will monitor as appropriate  # Hypochloremia: Lowest Cl = 84 mmol/L in last 2 days, will monitor as appropriate     # Anion Gap Metabolic Acidosis: Highest Anion Gap = 21 mmol/L in last 2 days, will monitor and treat as appropriate      # Hypertension: Noted on problem list               # Asthma: noted on problem list        Disposition Plan     Medically Ready for Discharge: Anticipated in 2-4 Days.  some of patient's duration of hospitalization will depend if he goes into active alcohol withdrawal           Luis Daniel Juarez MD  Hospitalist Service  [facility]  Securely message with PingStamp (more info)  Text page via [facility] Paging/Directory     ______________________________________________________________________    Chief Complaint   Seizure    History is obtained from the patient's wife at bedside, chart review, discussion with Dr. Quinones in the emergency department, review of outside record of July follow-up with gastroenterology where patient was encouraged to quit drinking and reported 2 beers per day.  Minimal history available from patient who is still postictal after seizure around 5:30 AM    History of Present Illness   Yimi Gibbs is a 59 year old male who has a history of hypertension, asthma, high risk alcohol use without known history of alcohol withdrawal, fatty liver disease who presents to the emergency department after his wife witnessed a shaking episode lasting approximately 30 to 60 seconds followed by a spell of confusion concerning for seizure with postictal state.    This past summer, patient had a syncopal episode when he stood up too quickly after boating for the day and drinking cocktails with friends and his wife.  No postictal state at that time.    In January, patient had an episode where he got out  of bed at approximately 4 in the morning to go to the bathroom.  His wife noted him standing there, cocked his head to the side and did not respond which she thought was unusual and perhaps he was joking with her before falling to the ground.  Suspect this may have been a seizure as he had a similar head cocking prior to seizure here in the emergency department.    Tonight, around 1 AM, patient got up to go to the bathroom and when he returned to bed, he laid down, did not express any concerns to his wife who asked if he was okay (recalling event one month prior).  Shortly thereafter, he had tonic-clonic shaking movements lasting approximately 30 seconds and then was confused thereafter.  Brought to the emergency department and noted to have an elevated lactic acid which improved.  Description and postictal state concerning for seizure, thought to be first-time seizure.  Patient was hoping to discharge home, but had another seizure event in the emergency department.  Lactic acid elevated again, following this event.    Received 2 mg of IV Ativan and admission requested.  Head CT without acute intracranial pathology.    Patient is still somewhat postictal at this time.  He apparently reported 2-3 beers per day to other providers, but his wife notes he is likely underreporting.  She is not certain how much he drinks or if he drinks in the morning.  She has not seen him in alcohol withdrawal.  He works from home, and has done so since COVID.  She thinks he has increased his drinking habits, but does not know by how much.  She is able to tell me that he has been going to the liquor store twice per week recently to buy a case of Clausob montano light beer each time.  He did drink last night, and blood alcohol was 0.04 here in the emergency department.    Patient reports no pain.  He tells me that he does not drink in the morning.  He is not entirely clear from his postictal state, however, so history may not be  reliable.      Past Medical History    Past Medical History:   Diagnosis Date    Abductor spasmodic dysphonia     Hyperlipidaemia LDL goal < 100     Hypertension        Past Surgical History   Past Surgical History:   Procedure Laterality Date    BUNIONECTOMY  2010    EXCISE MASS FOOT N/A 2/14/2023    Procedure: Fifth Metatarsal Head Resection Left Foot, debridment of Ulcer;  Surgeon: Kerrie Childs DPM;  Location: [facility] OR    SEPTOPLASTY  2007    THYROPLASTY  2001       Prior to Admission Medications   Prior to Admission Medications   Prescriptions Last Dose Informant Patient Reported? Taking?   QVAR REDIHALER 40 MCG/ACT inhaler   No No   Sig: Inhale 1 puff into the lungs 2 times daily   albuterol (PROAIR HFA/PROVENTIL HFA/VENTOLIN HFA) 108 (90 Base) MCG/ACT inhaler   No No   Sig: Inhale 2 puffs into the lungs every 6 hours as needed for shortness of breath / dyspnea or wheezing   amLODIPine (NORVASC) 5 MG tablet   No No   Sig: Take 1 tablet (5 mg) by mouth daily.   cetirizine (ZYRTEC) 10 MG tablet   Yes No   Sig: Take 10 mg by mouth daily   chlorthalidone (HYGROTON) 25 MG tablet   No No   Sig: TAKE 1 TABLET BY MOUTH DAILY   rosuvastatin (CRESTOR) 10 MG tablet   No No   Sig: Take 1 tablet (10 mg) by mouth daily.      Facility-Administered Medications: None           Physical Exam   Vital Signs: Temp: 99.1  F (37.3  C) Temp src: Oral BP: (!) 117/96 Pulse: 89   Resp: 11 SpO2: 97 % O2 Device: None (Room air)      General Appearance: Postictal 59-year-old male resting on [facility]  Eyes: Pupils dilated bilaterally following Ativan dosing.  Tracks, but distractible  HEENT: Extremely dry oral mucosa.  Atraumatic  Respiratory: Breath sounds clear bilaterally to auscultation.  Poor effort and only intermittently taking deep breaths when requested  Cardiovascular: Regular rate and rhythm without appreciable murmur  GI: Abdomen soft and nontender palpation.  Some central abdominal adiposity  Lymph/Hematologic: No lower  extremity edema  Genitourinary: Not examined  Skin: Somewhat flushed appearance.  No jaundice  Musculoskeletal: Muscular tone and bulk intact in all extremities and appropriate for age.  No pain with internal/external rotation of hips bilaterally.  No gross deformity of shoulders.  Neurologic: Patient still somewhat postictal.  Only intermittently follows simple commands such as to take a deep breath.  Clearing during interview slightly.  Frequent fidgeting.  No tremulousness or fasciculations noted65    Medical Decision Making       75 MINUTES SPENT BY ME on the date of service doing chart review, history, exam, documentation & further activities per the note.      Data     I have personally reviewed the following data over the past 24 hrs:    5.3  \   14.5   / 176     126 (L) 84 (L) 11.6 /  101 (H)   2.9 (L) 21 (L) 0.80 \     ALT: 110 (H) AST: 87 (H) AP: 107 TBILI: 0.8   ALB: 4.3 TOT PROTEIN: 7.6 LIPASE: N/A     Trop: <6 BNP: N/A     Procal: N/A CRP: N/A Lactic Acid: 14.9 (HH)       INR:  N/A PTT:  N/A   D-dimer:  <0.27 Fibrinogen:  N/A       Imaging results reviewed over the past 24 hrs:   Recent Results (from the past 24 hours)   CT Head w/o Contrast    Narrative    EXAM: CT HEAD W/O CONTRAST  LOCATION: [facility]  DATE: 2/23/2025    INDICATION: possible seizure  COMPARISON: None.  TECHNIQUE: Routine CT Head without IV contrast. Multiplanar reformats. Dose reduction techniques were used.    FINDINGS:  INTRACRANIAL CONTENTS: No intracranial hemorrhage, extraaxial collection, or mass effect.  No CT evidence of acute infarct. Normal parenchymal attenuation. Normal ventricles and sulci.     VISUALIZED ORBITS/SINUSES/MASTOIDS: No intraorbital abnormality. Moderate mucosal thickening scattered about the paranasal sinuses. No middle ear or mastoid effusion.    BONES/SOFT TISSUES: No acute abnormality.      Impression    IMPRESSION:  1.  No acute intracranial process.     XR Chest 2 Views     Narrative    EXAM: XR CHEST 2 VIEWS  LOCATION: [facility]  DATE: 2/23/2025    INDICATION: Syncope.  COMPARISON: Chest x-ray on 1/30/2018.      Impression    IMPRESSION: PA and lateral views of the chest were obtained. Cardiomediastinal silhouette is within normal limits. Mild basilar pulmonary opacities, likely atelectasis. No significant pleural effusion or pneumothorax.

## 2025-02-23 NOTE — ED TRIAGE NOTES
BIBA due to ? Post ictal / syncopal episode. Per EMS, Wife found pt on floor, called 911. Initally seen by police stated he's breathing was agonal, per EMS pt looked post ictal, eye wide open / obtunded, some confusion. Hx of syncopal episode ? Dx with vasovagal. No hx of szrs. + alcohol breath. -130 per EMS. . No HI, No pain

## 2025-02-24 VITALS
RESPIRATION RATE: 18 BRPM | OXYGEN SATURATION: 97 % | DIASTOLIC BLOOD PRESSURE: 73 MMHG | SYSTOLIC BLOOD PRESSURE: 103 MMHG | HEART RATE: 77 BPM | WEIGHT: 198.2 LBS | BODY MASS INDEX: 25.44 KG/M2 | TEMPERATURE: 97.4 F | HEIGHT: 74 IN

## 2025-02-24 LAB
ALBUMIN SERPL BCG-MCNC: 4.2 G/DL (ref 3.5–5.2)
ALP SERPL-CCNC: 89 U/L (ref 40–150)
ALT SERPL W P-5'-P-CCNC: 115 U/L (ref 0–70)
ANION GAP SERPL CALCULATED.3IONS-SCNC: 15 MMOL/L (ref 7–15)
AST SERPL W P-5'-P-CCNC: 123 U/L (ref 0–45)
BILIRUB SERPL-MCNC: 1.9 MG/DL
BUN SERPL-MCNC: 10 MG/DL (ref 6–20)
CALCIUM SERPL-MCNC: 9.1 MG/DL (ref 8.8–10.4)
CHLORIDE SERPL-SCNC: 91 MMOL/L (ref 98–107)
CREAT SERPL-MCNC: 0.7 MG/DL (ref 0.67–1.17)
EGFRCR SERPLBLD CKD-EPI 2021: >90 ML/MIN/1.73M2
ERYTHROCYTE [DISTWIDTH] IN BLOOD BY AUTOMATED COUNT: 11.7 % (ref 10–15)
GLUCOSE SERPL-MCNC: 138 MG/DL (ref 70–99)
HCO3 SERPL-SCNC: 24 MMOL/L (ref 22–29)
HCT VFR BLD AUTO: 41.3 % (ref 40–53)
HGB BLD-MCNC: 15.1 G/DL (ref 13.3–17.7)
MAGNESIUM SERPL-MCNC: 2.2 MG/DL (ref 1.7–2.3)
MCH RBC QN AUTO: 33.3 PG (ref 26.5–33)
MCHC RBC AUTO-ENTMCNC: 36.6 G/DL (ref 31.5–36.5)
MCV RBC AUTO: 91 FL (ref 78–100)
PLATELET # BLD AUTO: 159 10E3/UL (ref 150–450)
POTASSIUM SERPL-SCNC: 3.4 MMOL/L (ref 3.4–5.3)
PROT SERPL-MCNC: 7.4 G/DL (ref 6.4–8.3)
RBC # BLD AUTO: 4.53 10E6/UL (ref 4.4–5.9)
SODIUM SERPL-SCNC: 130 MMOL/L (ref 135–145)
WBC # BLD AUTO: 5 10E3/UL (ref 4–11)

## 2025-02-24 PROCEDURE — 82040 ASSAY OF SERUM ALBUMIN: CPT | Performed by: INTERNAL MEDICINE

## 2025-02-24 PROCEDURE — 85018 HEMOGLOBIN: CPT | Performed by: HOSPITALIST

## 2025-02-24 PROCEDURE — 99239 HOSP IP/OBS DSCHRG MGMT >30: CPT | Performed by: HOSPITALIST

## 2025-02-24 PROCEDURE — 82310 ASSAY OF CALCIUM: CPT | Performed by: INTERNAL MEDICINE

## 2025-02-24 PROCEDURE — 36415 COLL VENOUS BLD VENIPUNCTURE: CPT | Performed by: INTERNAL MEDICINE

## 2025-02-24 PROCEDURE — 84155 ASSAY OF PROTEIN SERUM: CPT | Performed by: INTERNAL MEDICINE

## 2025-02-24 PROCEDURE — 250N000013 HC RX MED GY IP 250 OP 250 PS 637: Performed by: PSYCHIATRY & NEUROLOGY

## 2025-02-24 PROCEDURE — 83735 ASSAY OF MAGNESIUM: CPT | Performed by: INTERNAL MEDICINE

## 2025-02-24 PROCEDURE — 250N000013 HC RX MED GY IP 250 OP 250 PS 637: Performed by: HOSPITALIST

## 2025-02-24 PROCEDURE — 99207 PR APP CREDIT; MD BILLING SHARED VISIT: CPT | Performed by: HOSPITALIST

## 2025-02-24 PROCEDURE — 999N000147 HC STATISTIC PT IP EVAL DEFER

## 2025-02-24 PROCEDURE — 250N000013 HC RX MED GY IP 250 OP 250 PS 637: Performed by: INTERNAL MEDICINE

## 2025-02-24 RX ORDER — LEVETIRACETAM 500 MG/1
500 TABLET ORAL 2 TIMES DAILY
Qty: 60 TABLET | Refills: 2 | Status: SHIPPED | OUTPATIENT
Start: 2025-02-24

## 2025-02-24 RX ORDER — LANOLIN ALCOHOL/MO/W.PET/CERES
100 CREAM (GRAM) TOPICAL DAILY
Qty: 4 TABLET | Refills: 0 | Status: SHIPPED | OUTPATIENT
Start: 2025-02-25

## 2025-02-24 RX ORDER — POTASSIUM CHLORIDE 1500 MG/1
40 TABLET, EXTENDED RELEASE ORAL ONCE
Status: COMPLETED | OUTPATIENT
Start: 2025-02-24 | End: 2025-02-24

## 2025-02-24 RX ORDER — FOLIC ACID 1 MG/1
1 TABLET ORAL DAILY
Qty: 15 TABLET | Refills: 0 | Status: SHIPPED | OUTPATIENT
Start: 2025-02-25

## 2025-02-24 RX ORDER — MULTIPLE VITAMINS W/ MINERALS TAB 9MG-400MCG
1 TAB ORAL DAILY
Qty: 30 TABLET | Refills: 0 | Status: SHIPPED | OUTPATIENT
Start: 2025-02-25

## 2025-02-24 RX ADMIN — POTASSIUM CHLORIDE 40 MEQ: 1500 TABLET, EXTENDED RELEASE ORAL at 10:14

## 2025-02-24 RX ADMIN — AMLODIPINE BESYLATE 5 MG: 5 TABLET ORAL at 08:00

## 2025-02-24 RX ADMIN — Medication 1 TABLET: at 08:00

## 2025-02-24 RX ADMIN — FOLIC ACID 1 MG: 1 TABLET ORAL at 08:00

## 2025-02-24 RX ADMIN — THIAMINE HCL TAB 100 MG 100 MG: 100 TAB at 08:00

## 2025-02-24 RX ADMIN — LEVETIRACETAM 500 MG: 500 TABLET, FILM COATED ORAL at 08:00

## 2025-02-24 ASSESSMENT — ACTIVITIES OF DAILY LIVING (ADL)
ADLS_ACUITY_SCORE: 49

## 2025-02-24 NOTE — CONSULTS
Attempted to see pt for CD Consult but was informed by unit staff that pt has discharged.    FALGUNI Higuera, [facility]  Substance Use Disorder Evaluation Counselor  Email: [email or web address]     .

## 2025-02-24 NOTE — PLAN OF CARE
Here for seizures. A&O x4.  Neuros intact. VSS on RA. CIWA 0. Up w/ SBA. Denied pain. Voiding in BR. Tolerating diet. Takes pills whole. Alarms on. Plan for discharge to home w spouse.

## 2025-02-24 NOTE — PROGRESS NOTES
[facility]    Medicine Progress Note - Hospitalist Service    Date of Admission:  2/23/2025    Assessment & Plan     Yimi Gibbs is a 59 year old male with a history of hypertension, fatty liver disease presented to the hospital after a shaking episode followed by confusion at home concerning for seizure, witnessed seizure event here in the emergency department and admitted on 2/23/2025.     Seizures likely due to alcohol withdrawal  EEG showed left temporal sharp waves  -On admission presented with witnessed seizure  -He had episode about 1 month ago when he was seen in ER for possible syncopal  -CT head no acute intracranial process  -X-ray chest no acute process and has likely basilar atelectasis  -MRI of the brain showed no acute intracranial process, mild generalized cerebral atrophy and moderate mucosal thickening scattered about the paranasal sinuses  -EKG showed sinus rhythm  -D-dimer was less than 0.27  -CBC on admit was normal  -VBG on admit showed pH of 7.47 with pCO2 41 and pO2 of 83  -Also on admit he had electrolyte abnormalities in form of hyponatremia with sodium of 126, potassium of 2.9, chloride of 84 with a bicarb of 21 and anion gap of 21  -B12 normal at 559  -EEG showed left temporal sharp waves  -Started on Keppra 500 twice daily by the neurology team  -As per neurology 3 months driving restriction given the witnessed seizure and patient and wife were made aware of the same  -Follow-up with neurology in 3 months  - The plan was discussed with patient and wife rudy in person and they are in agreement with plan and had no questions or concerns    High risk alcohol use  - No known history of alcohol withdrawal, but suspect seizure events may be related to alcohol and/or alcohol withdrawal.  He reports 2-3 beers per day, but his wife notes that he has been going to the liquor store about twice per week and buying at least a 24 pack of beer each time he goes.   Fatty liver infiltration previously noted on GI evaluation with known underreporting of alcohol use.  Not currently tremulous or with fasciculations  -Alcohol level on admit was 0.04  -Continue with thiamine, multivitamin and folic acid  -CIWA protocol  -Chemical dependency consulted  -     Acute on chronic hypochloremic hyponatremia-POA- Improved  Hypokalemia-resolved  Hypophosphatemia-resolved  -On admission patient had sodium of 126 with potassium of 2.9 with chloride of 84  -On review of his prior labs in 6/24 and 1/9/2025 his sodium does run low and on 1/9 was 129 with chloride of 87  -This is likely due to underlying heavy alcohol use and chlorthalidone  -TSH normal at 1.05  -PTA chlorthalidone has been held and patient was given 1 L during the lactate  - sodium has improved to 130 which seems to be near baseline  - repeat bmp as outpatient and pt to discuss with pcp about blood pressure management       Anion gap metabolic acidosis -Secondary to lactic acidosis as below-resolved  Lactic acidosis: Secondary to seizure.   -His lactate was significantly elevated at 14.9 and significantly improved to 3 and is likely due to underlying witnessed seizure event     Chronic LFT elevations  -His LFTs on admission showed total bilirubin of 0.8 with ALT of 110 and AST of 87  -Also his AST and ALT were elevated on 1/9  - Suspect related to fatty liver disease, which is known.  Alcohol, obesity contributing. History of LFT elevations for which he has followed with [facility]ILL[facility]; reported drinking 2 beer/day at that time (July 2024), but wife notes that he likely under reports to providers as he is embarrassed.  -Note prior recommendation for FibroScan as an outpatient, he declined  -I again stressed the importance of doing FibroScan as outpatient and discussed with patient that we can have him follows up outpatient with Dr. Knight from [facility] and number was given to the patient in case he changes his mind  -Discussed the  "importance of alcohol cessation  -Recommend checking LFTs as outpatient  -Hold statin       Hyperlipidemia:  -Holding statin  for now   - recheck labs as outpatient and then discuss with pcp before starting as outpatient      Essential hypertension:  -Prior to admission chlorthalidone on hold with hyponatremia  - discontinue chlorethalidone as outpatient   -Continue PTA amlodipine 5 mg daily     History of spasmodic dysphonia  - Noted in chart review.      Family communication  I had taken permission from the patient and he was comfortable talking in front of his wife and daughter-and discussed plan of care with them and patient and wife are comfortable with the plan of care and they will be following as outpatient with primary care physician and neurologist and patient is aware that he should self report himself to the [facility] and should not drive for 3 months.  Patient understood risk and benefits and both patient and wife were in agreement              Diet: Combination Diet Regular Diet Adult    DVT Prophylaxis: Pneumatic Compression Devices and ambulate  Gomez Catheter: Not present  Lines: None     Cardiac Monitoring: None  Code Status: Full Code      Clinically Significant Risk Factors        # Hypokalemia: Lowest K = 2.9 mmol/L in last 2 days, will replace as needed  # Hyponatremia: Lowest Na = 126 mmol/L in last 2 days, will monitor as appropriate  # Hypochloremia: Lowest Cl = 84 mmol/L in last 2 days, will monitor as appropriate     # Anion Gap Metabolic Acidosis: Highest Anion Gap = 21 mmol/L in last 2 days, will monitor and treat as appropriate      # Hypertension: Noted on problem list            # Overweight: Estimated body mass index is 25.45 kg/m  as calculated from the following:    Height as of this encounter: 1.88 m (6' 2\").    Weight as of this encounter: 89.9 kg (198 lb 3.2 oz)., PRESENT ON ADMISSION     # Asthma: noted on problem list        Social Drivers of Health    Financial Resource Strain: Low " Risk  (6/18/2024)    Financial Resource Strain     Within the past 12 months, have you or your family members you live with been unable to get utilities (heat, electricity) when it was really needed?: No   Recent Concern: Financial Resource Strain - High Risk (6/10/2024)    Financial Resource Strain     Within the past 12 months, have you or your family members you live with been unable to get utilities (heat, electricity) when it was really needed?: Yes   Social Connections: Unknown (6/10/2024)    Social Connection and Isolation Panel [NHANES]     Frequency of Social Gatherings with Friends and Family: Once a week          Disposition Plan     Medically Ready for Discharge: Anticipated Today, pending evaluation by the PT team and the CDT             Rosendo Holt MD  Hospitalist Service  [facility]  Securely message with [employer] (more info)  Text page via [employer] Paging/Directory     This note was completed in part using dictation via the Dragon voice recognition software. Some word and grammatical errors may occur and must be interpreted in the appropriate clinical context. If there are any questions pertaining to this issue, please contact me for further clarification.    ______________________________________________________________________    Interval History   Patient is new to me  -Seen this morning and his wife and daughter were present and patient gave permission to speak in front of them and mentioned that he is feeling little tired but was at baseline and denied any shortness of breath, chest pain, nausea or vomiting per my discussion with the patient and wife felt he was at baseline  -Discussed treatment plans and ongoing follow-up send patient and wife in agreement  -No evidence of any further seizure activity  -As per patient his last drink was on Saturday      Physical Exam   Vital Signs: Temp: 99.6  F (37.6  C) Temp src: Axillary BP: 119/83 Pulse: 64   Resp: 16 SpO2: 98 % O2  Device: None (Room air)    Weight: 198 lbs 3.2 oz        General: Patient appears comfortable and in no acute distress.  HEENT: Head is atraumatic, normocephalic.    Neck: Neck is supple   Respiratory: Lungs are clear to auscultation bilaterally with no wheeze or crackles   Cardiovascular: Regular rate , S1 and S2 normal with no murmer or rubs or gallops  Abdomen:   soft , non tender , non distended and bowel sound present   Skin: No skin rashes or lesions to inspection or palpation.  Neurologic: Higher functions are within normal limits. No obvious defects in speech, language and memory. No facial droop  Musculoskeletal: Normal Range of motion over upper and lower extremities bilaterally   Psychiatric: cooperative      Medical Decision Making       Time spent in care of patient is 45 minutes and I reviewed labs and discussed plan of care in detail with the patient and the nursing staff and also spoke with his wife and treatment plan was discussed and they are in agreement      Data     I have personally reviewed the following data over the past 24 hrs:    5.0  \   15.1   / 159     130 (L) 91 (L) 10.0 /  138 (H)   3.4 24 0.70 \     ALT: 115 (H) AST: 123 (H) AP: 89 TBILI: 1.9 (H)   ALB: 4.2 TOT PROTEIN: 7.4 LIPASE: N/A     TSH: N/A T4: N/A A1C: N/A       Imaging results reviewed over the past 24 hrs:   Recent Results (from the past 24 hours)   MR Brain w/o & w Contrast    Narrative    EXAM: MR BRAIN W/O and W CONTRAST  LOCATION: [facility]  DATE: 2/23/2025    INDICATION: New diagnosis of seizures.  COMPARISON: CT head dated 2/23/2025  CONTRAST: 9mL Gadavist  TECHNIQUE: Routine multiplanar multisequence head MRI without and with intravenous contrast.    FINDINGS:  INTRACRANIAL CONTENTS: No acute or subacute infarct. No mass, acute hemorrhage, or extra-axial fluid collections. Normal brain parenchymal signal for patient age. Mild generalized cerebral atrophy. No hydrocephalus. Normal position  of the cerebellar   tonsils. No pathologic contrast enhancement.    SELLA: Not well assessed on this examination.    OSSEOUS STRUCTURES/SOFT TISSUES: Normal marrow signal. The major intracranial vascular flow voids are maintained.     ORBITS: No abnormality accounting for technique.     SINUSES/MASTOIDS: Moderate mucosal thickening scattered about the paranasal sinuses. No middle ear or mastoid effusion.       Impression    IMPRESSION:  1.  No acute intracranial process.  2.  Mild generalized cerebral atrophy.  3.  Moderate mucosal thickening scattered about the paranasal sinuses.

## 2025-02-24 NOTE — DISCHARGE INSTRUCTIONS
Check bp daily and make a log and bring to next pcp appointment    To be connected to outpatient chemical dependency treatment and programming:  Call [facility] at [phone]

## 2025-02-24 NOTE — DISCHARGE SUMMARY
"[facility]  Hospitalist Discharge Summary      Date of Admission:  2/23/2025  Date of Discharge:  2/24/2025  Discharging Provider: Rosendo Holt MD  Discharge Service: Hospitalist Service    Discharge Diagnoses       Seizures likely due to alcohol withdrawal  EEG showed left temporal sharp waves  High risk alcohol use  Acute on chronic hypochloremic hyponatremia-POA- Improved  Hypokalemia-resolved  Hypophosphatemia-resolved  Anion gap metabolic acidosis -Secondary to lactic acidosis as below-resolved  Lactic acidosis: Secondary to seizure.   Chronic LFT elevations  Hyperlipidemia  Essential hypertension    Clinically Significant Risk Factors     # Overweight: Estimated body mass index is 25.45 kg/m  as calculated from the following:    Height as of this encounter: 1.88 m (6' 2\").    Weight as of this encounter: 89.9 kg (198 lb 3.2 oz).       Follow-ups Needed After Discharge   Follow-up Appointments       Follow Up      Follow up with  [facility] with dr GRETEL redd in 2-2.5 months and please call [phone] for appointment        Hospital Follow-up with Existing Primary Care Provider (PCP)      Please see details below     CMP  and  basic metabolic panel check in one week   - held statin due to high lft   - held chlorthalidone due to hyponatremia   - please address BP management and add other agent if high    Schedule Primary Care visit within: 7 Days           {Additional follow-up instructions/to-do's for PCP        Unresulted Labs Ordered in the Past 30 Days of this Admission       No orders found for last 31 day(s).        These results will be followed up by     Discharge Disposition   Discharged to home  Condition at discharge: Stable    Hospital Course     Yimi Gibbs is a 59 year old male with a history of hypertension, fatty liver disease presented to the hospital after a shaking episode followed by confusion at home concerning for seizure, " witnessed seizure event here in the emergency department and admitted on 2/23/2025.     Seizures likely due to alcohol withdrawal  EEG showed left temporal sharp waves  -On admission presented with witnessed seizure  -He had episode about 1 month ago when he was seen in ER for possible syncopal  -CT head no acute intracranial process  -X-ray chest no acute process and has likely basilar atelectasis  -MRI of the brain showed no acute intracranial process, mild generalized cerebral atrophy and moderate mucosal thickening scattered about the paranasal sinuses  -EKG showed sinus rhythm  -D-dimer was less than 0.27  -CBC on admit was normal  -VBG on admit showed pH of 7.47 with pCO2 41 and pO2 of 83  -Also on admit he had electrolyte abnormalities in form of hyponatremia with sodium of 126, potassium of 2.9, chloride of 84 with a bicarb of 21 and anion gap of 21  -B12 normal at 559  -EEG showed left temporal sharp waves  -Started on Keppra 500 twice daily by the neurology team  -As per neurology 3 months driving restriction given the witnessed seizure and patient and wife were made aware of the same  -Follow-up with neurology in 3 months  - The plan was discussed with patient and wife rudy in person and they are in agreement with plan and had no questions or concerns     High risk alcohol use  - No known history of alcohol withdrawal, but suspect seizure events may be related to alcohol and/or alcohol withdrawal.  He reports 2-3 beers per day, but his wife notes that he has been going to the liquor store about twice per week and buying at least a 24 pack of beer each time he goes.  Fatty liver infiltration previously noted on GI evaluation with known underreporting of alcohol use.  Not currently tremulous or with fasciculations  -Alcohol level on admit was 0.04  -Continue with thiamine, multivitamin and folic acid  -[facility] protocol  -Chemical dependency consulted but there was delay in them seeing him and pt and wife in  agreement in not seeing them before discharge and given number to call as outpatient   -     Acute on chronic hypochloremic hyponatremia-POA- Improved  Hypokalemia-resolved  Hypophosphatemia-resolved  -On admission patient had sodium of 126 with potassium of 2.9 with chloride of 84  -On review of his prior labs in 6/24 and 1/9/2025 his sodium does run low and on 1/9 was 129 with chloride of 87  -This is likely due to underlying heavy alcohol use and chlorthalidone  -TSH normal at 1.05  -PTA chlorthalidone has been held and patient was given 1 L during the lactate  - sodium has improved to 130 which seems to be near baseline  - repeat bmp as outpatient and pt to discuss with pcp about blood pressure management        Anion gap metabolic acidosis -Secondary to lactic acidosis as below-resolved  Lactic acidosis: Secondary to seizure.   -His lactate was significantly elevated at 14.9 and significantly improved to 3 and is likely due to underlying witnessed seizure event     Chronic LFT elevations  -His LFTs on admission showed total bilirubin of 0.8 with ALT of 110 and AST of 87  -Also his AST and ALT were elevated on 1/9  - Suspect related to fatty liver disease, which is known.  Alcohol, obesity contributing. History of LFT elevations for which he has followed with Eugene MORILLO; reported drinking 2 beer/day at that time (July 2024), but wife notes that he likely under reports to providers as he is embarrassed.  -Note prior recommendation for FibroScan as an outpatient, he declined  -I again stressed the importance of doing FibroScan as outpatient and discussed with patient that we can have him follows up outpatient with Dr. Knight from  and number was given to the patient in case he changes his mind  -Discussed the importance of alcohol cessation  -Recommend checking LFTs as outpatient  -Hold statin        Hyperlipidemia  -Holding statin  for now   - recheck labs as outpatient and then discuss with pcp before starting  as outpatient      Essential hypertension  -Prior to admission chlorthalidone on hold with hyponatremia  - discontinue chlorethalidone as outpatient   -Continue PTA amlodipine 5 mg daily     History of spasmodic dysphonia  - Noted in chart review.       Consultations This Hospital Stay   NEUROLOGY IP CONSULT  CHEMICAL DEPENDENCY IP CONSULT  PHYSICAL THERAPY ADULT IP CONSULT    Code Status   Full Code    Time Spent on this Encounter   I, Rosendo Holt MD, personally saw the patient today and spent greater than 30 minutes discharging this patient.       Rosendo Holt MD  [facility] NEUROSCIENCE [facility]  [address] [place] [address]  Phone: [phone]  ______________________________________________________________________    Physical Exam   Vital Signs: Temp: 97.7  F (36.5  C) Temp src: Oral BP: 122/74 Pulse: 65   Resp: 16 SpO2: 96 % O2 Device: None (Room air)    Weight: 198 lbs 3.2 oz    General: Patient appears comfortable and in no acute distress.   Respiratory: Lungs are clear to auscultation bilaterally with no wheeze or crackles   Cardiovascular: Regular rate , S1 and S2 normal with no murmer or rubs or gallops  Abdomen:   soft , non tender , non distended and bowel sound present   Skin: No skin rashes or lesions to inspection or palpation.  Neurologic: Higher functions are within normal limits. No obvious defects in speech, language and memory. No facial droop  Musculoskeletal: Normal Range of motion over upper and lower extremities bilaterally   Psychiatric: cooperative        Primary Care Physician   Doug Subramanian    Discharge Orders      Discharge Instructions    NO DRIVING FOR 3 MONTHS AFTER THE SEIZURE, AS PER [place] GUIDELINES.     Reason for your hospital stay    Seizure disorder  Alcohol use     Activity    Your activity upon discharge: activity as tolerated     Follow Up    Follow up with  [facility] with dr GRETEL redd in 2-2.5 months and please call  [phone] for appointment     Diet    Follow this diet upon discharge: Current Diet:Orders Placed This Encounter      Combination Diet Regular Diet Adult     Hospital Follow-up with Existing Primary Care Provider (PCP)    Please see details below     CMP  and  basic metabolic panel check in one week   - held statin due to high lft   - held chlorthalidone due to hyponatremia   - please address BP management and add other agent if high       Significant Results and Procedures   Most Recent 3 CBC's:  Recent Labs   Lab Test 02/24/25  0815 02/23/25  0209 01/09/25  0530   WBC 5.0 5.3 5.2   HGB 15.1 14.5 15.8   MCV 91 90 91    176 155     Most Recent 3 BMP's:  Recent Labs   Lab Test 02/24/25  0815 02/23/25  1648 02/23/25  0924 02/23/25  0209 01/09/25  0530   *  --   --  126* 129*   POTASSIUM 3.4 3.5 3.1* 2.9* 3.8   CHLORIDE 91*  --   --  84* 87*   CO2 24  --   --  21* 24   BUN 10.0  --   --  11.6 12.0   CR 0.70  --   --  0.80 0.84   ANIONGAP 15  --   --  21* 18*   THANG 9.1  --   --  9.0 9.5   *  --   --  101* 155*     Most Recent 2 LFT's:  Recent Labs   Lab Test 02/24/25 0815 02/23/25  0209   * 87*   * 110*   ALKPHOS 89 107   BILITOTAL 1.9* 0.8     Most Recent 3 INR's:No lab results found.  Most Recent INR's and Anticoagulation Dosing History:  Anticoagulation Dose History           No data to display              Most Recent 3 Creatinines:  Recent Labs   Lab Test 02/24/25  0815 02/23/25  0209 01/09/25  0530   CR 0.70 0.80 0.84     Most Recent 3 Hemoglobins:  Recent Labs   Lab Test 02/24/25  0815 02/23/25  0209 01/09/25  0530   HGB 15.1 14.5 15.8     Most Recent 3 Troponin's:No lab results found.  Most Recent 3 BNP's:No lab results found.  Most Recent D-dimer:  Recent Labs   Lab Test 02/23/25  0209   DD <0.27     Most Recent Cholesterol Panel:  Recent Labs   Lab Test 06/11/24  0000   CHOL 133   LDL 85   HDL 38*   TRIG 51   ,   Results for orders placed or performed during the hospital  encounter of 02/23/25   XR Chest 2 Views    Narrative    EXAM: XR CHEST 2 VIEWS  LOCATION: [facility]  DATE: 2/23/2025    INDICATION: Syncope.  COMPARISON: Chest x-ray on 1/30/2018.      Impression    IMPRESSION: PA and lateral views of the chest were obtained. Cardiomediastinal silhouette is within normal limits. Mild basilar pulmonary opacities, likely atelectasis. No significant pleural effusion or pneumothorax.   CT Head w/o Contrast    Narrative    EXAM: CT HEAD W/O CONTRAST  LOCATION: [facility]  DATE: 2/23/2025    INDICATION: possible seizure  COMPARISON: None.  TECHNIQUE: Routine CT Head without IV contrast. Multiplanar reformats. Dose reduction techniques were used.    FINDINGS:  INTRACRANIAL CONTENTS: No intracranial hemorrhage, extraaxial collection, or mass effect.  No CT evidence of acute infarct. Normal parenchymal attenuation. Normal ventricles and sulci.     VISUALIZED ORBITS/SINUSES/MASTOIDS: No intraorbital abnormality. Moderate mucosal thickening scattered about the paranasal sinuses. No middle ear or mastoid effusion.    BONES/SOFT TISSUES: No acute abnormality.      Impression    IMPRESSION:  1.  No acute intracranial process.     MR Brain w/o & w Contrast    Narrative    EXAM: MR BRAIN W/O and W CONTRAST  LOCATION: [facility]  DATE: 2/23/2025    INDICATION: New diagnosis of seizures.  COMPARISON: CT head dated 2/23/2025  CONTRAST: 9mL Gadavist  TECHNIQUE: Routine multiplanar multisequence head MRI without and with intravenous contrast.    FINDINGS:  INTRACRANIAL CONTENTS: No acute or subacute infarct. No mass, acute hemorrhage, or extra-axial fluid collections. Normal brain parenchymal signal for patient age. Mild generalized cerebral atrophy. No hydrocephalus. Normal position of the cerebellar   tonsils. No pathologic contrast enhancement.    SELLA: Not well assessed on this examination.    OSSEOUS STRUCTURES/SOFT  TISSUES: Normal marrow signal. The major intracranial vascular flow voids are maintained.     ORBITS: No abnormality accounting for technique.     SINUSES/MASTOIDS: Moderate mucosal thickening scattered about the paranasal sinuses. No middle ear or mastoid effusion.       Impression    IMPRESSION:  1.  No acute intracranial process.  2.  Mild generalized cerebral atrophy.  3.  Moderate mucosal thickening scattered about the paranasal sinuses.       Discharge Medications   Current Discharge Medication List        START taking these medications    Details   folic acid (FOLVITE) 1 MG tablet Take 1 tablet (1 mg) by mouth daily.  Qty: 15 tablet, Refills: 0    Associated Diagnoses: Alcohol use      levETIRAcetam (KEPPRA) 500 MG tablet Take 1 tablet (500 mg) by mouth 2 times daily.  Qty: 60 tablet, Refills: 2    Associated Diagnoses: Seizure (H)      multivitamin w/minerals (THERA-VIT-M) tablet Take 1 tablet by mouth daily.  Qty: 30 tablet, Refills: 0    Associated Diagnoses: Alcohol use      thiamine (B-1) 100 MG tablet Take 1 tablet (100 mg) by mouth daily.  Qty: 4 tablet, Refills: 0    Associated Diagnoses: Alcohol use           CONTINUE these medications which have NOT CHANGED    Details   albuterol (PROAIR HFA/PROVENTIL HFA/VENTOLIN HFA) 108 (90 Base) MCG/ACT inhaler Inhale 2 puffs into the lungs every 6 hours as needed for shortness of breath / dyspnea or wheezing  Qty: 8.5 g, Refills: 2    Comments: Pharmacy may dispense brand covered by insurance (Proair, or proventil or ventolin or generic albuterol inhaler)  Associated Diagnoses: Chronic allergic bronchitis      amLODIPine (NORVASC) 5 MG tablet Take 1 tablet (5 mg) by mouth daily.  Qty: 90 tablet, Refills: 1    Associated Diagnoses: Essential hypertension      cetirizine (ZYRTEC) 10 MG tablet Take 10 mg by mouth daily as needed for allergies.           STOP taking these medications       chlorthalidone (HYGROTON) 25 MG tablet Comments:   Reason for Stopping:          rosuvastatin (CRESTOR) 10 MG tablet Comments:   Reason for Stopping:             Allergies   Allergies   Allergen Reactions    Lisinopril Angioedema    Losartan Angioedema

## 2025-02-24 NOTE — PLAN OF CARE
Physical Therapy: Orders received. Chart reviewed and discussed with care team.? Spoke with pt and spouse in room. Pt lives with spouse in a single level rambler style home with 3 stairs to enter. Patient was able to ambulate 300' independently and safely ascend and descend 4 stairs without use of a railing. Patient is not demonstrating any acute functional mobility deficits, will defer a formal physical therapy evaluation.? Defer discharge recommendations to care team.? Will complete orders.

## 2025-02-24 NOTE — PROGRESS NOTES
Social work accessing patient's chart to provide instructions on the discharge summary. Patient needing phone number for [facility], as patient is leaving before CD will be able to see him. Phone number is [phone].     LES Mariano, [facility]   Social Work   [facility]

## 2025-02-24 NOTE — PLAN OF CARE
Goal Outcome Evaluation:                    Reason for Admission: Seizures    Cognitive/Mentation: A/Ox 4  Neuros/CMS: Intact ex pressured speech.  VS: VSS on RA.   Tele: NSR.  /GI: Continent. Last BM PTA.   Pulmonary: LS clear.  Pain: Denies.     Drains/Lines: R PIV, SL.   Skin: Flushed, otherwise intact.  Activity: SBA w/ GB.   Diet: Regular with thin liquids. Takes pills whole.     Therapies recs: Pending.  Discharge: Pending.    Aggression Stoplight Tool: Green

## 2025-06-16 ENCOUNTER — HOSPITAL ENCOUNTER (INPATIENT)
Facility: CLINIC | Age: 60
End: 2025-06-16
Attending: EMERGENCY MEDICINE | Admitting: INTERNAL MEDICINE
Payer: COMMERCIAL

## 2025-06-16 ENCOUNTER — APPOINTMENT (OUTPATIENT)
Dept: ULTRASOUND IMAGING | Facility: CLINIC | Age: 60
End: 2025-06-16
Attending: EMERGENCY MEDICINE
Payer: COMMERCIAL

## 2025-06-16 DIAGNOSIS — C22.0 HCC (HEPATOCELLULAR CARCINOMA) (H): Primary | ICD-10-CM

## 2025-06-16 DIAGNOSIS — K65.2 SBP (SPONTANEOUS BACTERIAL PERITONITIS) (H): ICD-10-CM

## 2025-06-16 DIAGNOSIS — K70.31 ALCOHOLIC CIRRHOSIS OF LIVER WITH ASCITES (H): ICD-10-CM

## 2025-06-16 DIAGNOSIS — K21.9 GASTROESOPHAGEAL REFLUX DISEASE WITHOUT ESOPHAGITIS: ICD-10-CM

## 2025-06-16 DIAGNOSIS — I81 PORTAL VEIN THROMBOSIS: ICD-10-CM

## 2025-06-16 DIAGNOSIS — R18.8 OTHER ASCITES: ICD-10-CM

## 2025-06-16 LAB
ALBUMIN SERPL BCG-MCNC: 3.8 G/DL (ref 3.5–5.2)
ALP SERPL-CCNC: 582 U/L (ref 40–150)
ALT SERPL W P-5'-P-CCNC: 126 U/L (ref 0–70)
ANION GAP SERPL CALCULATED.3IONS-SCNC: 12 MMOL/L (ref 7–15)
AST SERPL W P-5'-P-CCNC: 454 U/L (ref 0–45)
BASOPHILS # BLD AUTO: 0 10E3/UL (ref 0–0.2)
BASOPHILS NFR BLD AUTO: 1 %
BILIRUB SERPL-MCNC: 6.8 MG/DL
BUN SERPL-MCNC: 30.3 MG/DL (ref 6–20)
CALCIUM SERPL-MCNC: 9.1 MG/DL (ref 8.8–10.4)
CHLORIDE SERPL-SCNC: 100 MMOL/L (ref 98–107)
CREAT SERPL-MCNC: 1.04 MG/DL (ref 0.67–1.17)
EGFRCR SERPLBLD CKD-EPI 2021: 83 ML/MIN/1.73M2
EOSINOPHIL # BLD AUTO: 0.1 10E3/UL (ref 0–0.7)
EOSINOPHIL NFR BLD AUTO: 2 %
ERYTHROCYTE [DISTWIDTH] IN BLOOD BY AUTOMATED COUNT: 16.1 % (ref 10–15)
ETHANOL SERPL-MCNC: <0.01 G/DL
GLUCOSE SERPL-MCNC: 116 MG/DL (ref 70–99)
HCO3 SERPL-SCNC: 24 MMOL/L (ref 22–29)
HCT VFR BLD AUTO: 37.3 % (ref 40–53)
HGB BLD-MCNC: 12.1 G/DL (ref 13.3–17.7)
HOLD SPECIMEN: NORMAL
IMM GRANULOCYTES # BLD: 0.1 10E3/UL
IMM GRANULOCYTES NFR BLD: 1 %
INR PPP: 1.17 (ref 0.85–1.15)
LIPASE SERPL-CCNC: 84 U/L (ref 13–60)
LYMPHOCYTES # BLD AUTO: 1.1 10E3/UL (ref 0.8–5.3)
LYMPHOCYTES NFR BLD AUTO: 17 %
MCH RBC QN AUTO: 31.4 PG (ref 26.5–33)
MCHC RBC AUTO-ENTMCNC: 32.4 G/DL (ref 31.5–36.5)
MCV RBC AUTO: 97 FL (ref 78–100)
MONOCYTES # BLD AUTO: 0.5 10E3/UL (ref 0–1.3)
MONOCYTES NFR BLD AUTO: 8 %
NEUTROPHILS # BLD AUTO: 4.4 10E3/UL (ref 1.6–8.3)
NEUTROPHILS NFR BLD AUTO: 71 %
NRBC # BLD AUTO: 0 10E3/UL
NRBC BLD AUTO-RTO: 0 /100
PLATELET # BLD AUTO: 88 10E3/UL (ref 150–450)
POTASSIUM SERPL-SCNC: 5 MMOL/L (ref 3.4–5.3)
PROT SERPL-MCNC: 7.6 G/DL (ref 6.4–8.3)
PROTHROMBIN TIME: 14.6 SECONDS (ref 11.8–14.8)
RBC # BLD AUTO: 3.85 10E6/UL (ref 4.4–5.9)
SODIUM SERPL-SCNC: 136 MMOL/L (ref 135–145)
WBC # BLD AUTO: 6.2 10E3/UL (ref 4–11)

## 2025-06-16 PROCEDURE — 76705 ECHO EXAM OF ABDOMEN: CPT

## 2025-06-16 PROCEDURE — 84100 ASSAY OF PHOSPHORUS: CPT | Performed by: INTERNAL MEDICINE

## 2025-06-16 PROCEDURE — 82105 ALPHA-FETOPROTEIN SERUM: CPT | Performed by: PHYSICIAN ASSISTANT

## 2025-06-16 PROCEDURE — 85004 AUTOMATED DIFF WBC COUNT: CPT | Performed by: EMERGENCY MEDICINE

## 2025-06-16 PROCEDURE — 83735 ASSAY OF MAGNESIUM: CPT | Performed by: INTERNAL MEDICINE

## 2025-06-16 PROCEDURE — 84155 ASSAY OF PROTEIN SERUM: CPT | Performed by: EMERGENCY MEDICINE

## 2025-06-16 PROCEDURE — 85610 PROTHROMBIN TIME: CPT | Performed by: EMERGENCY MEDICINE

## 2025-06-16 PROCEDURE — 83690 ASSAY OF LIPASE: CPT | Performed by: EMERGENCY MEDICINE

## 2025-06-16 PROCEDURE — 36415 COLL VENOUS BLD VENIPUNCTURE: CPT | Performed by: EMERGENCY MEDICINE

## 2025-06-16 PROCEDURE — 99285 EMERGENCY DEPT VISIT HI MDM: CPT | Mod: 25

## 2025-06-16 PROCEDURE — 82077 ASSAY SPEC XCP UR&BREATH IA: CPT | Performed by: EMERGENCY MEDICINE

## 2025-06-16 RX ORDER — OXYCODONE HYDROCHLORIDE 5 MG/1
5 TABLET ORAL EVERY 6 HOURS PRN
Refills: 0 | Status: DISCONTINUED | OUTPATIENT
Start: 2025-06-16 | End: 2025-06-17

## 2025-06-16 ASSESSMENT — COLUMBIA-SUICIDE SEVERITY RATING SCALE - C-SSRS
2. HAVE YOU ACTUALLY HAD ANY THOUGHTS OF KILLING YOURSELF IN THE PAST MONTH?: NO
6. HAVE YOU EVER DONE ANYTHING, STARTED TO DO ANYTHING, OR PREPARED TO DO ANYTHING TO END YOUR LIFE?: NO
1. IN THE PAST MONTH, HAVE YOU WISHED YOU WERE DEAD OR WISHED YOU COULD GO TO SLEEP AND NOT WAKE UP?: NO

## 2025-06-16 ASSESSMENT — ACTIVITIES OF DAILY LIVING (ADL)
ADLS_ACUITY_SCORE: 49

## 2025-06-16 NOTE — LETTER
Yimi Gibbs was seen and treated in our emergency department on 6/16/2025.  He may return to work on [unfilled].  [unfilled]     If you have any questions or concerns, please don't hesitate to call.      [unfilled]

## 2025-06-16 NOTE — ED TRIAGE NOTES
Pt comes in for a week of upper right sided abdominal pain. States nausea and diarrhea. Hx of high liver enzymes but has not followed up on it. Pt appears jaundiced in triage. Pt has hx of seizures for etoh withdrawal. Abdomen is firm upon palpation.      Triage Assessment (Adult)       Row Name 06/16/25 1812          Triage Assessment    Airway WDL WDL        Respiratory WDL    Respiratory WDL WDL        Skin Circulation/Temperature WDL    Skin Circulation/Temperature WDL --  jaundice        Cardiac WDL    Cardiac WDL WDL        Peripheral/Neurovascular WDL    Peripheral Neurovascular WDL WDL        Cognitive/Neuro/Behavioral WDL    Cognitive/Neuro/Behavioral WDL WDL

## 2025-06-16 NOTE — LETTER
Yimi Gibbs    YOB: 1965      Hospital:  [facility]    [address] [place] [address]        Date of Admission:  6/16/25-present       To Whom it May be Concerned,    Yimi Gibbs was admitted to [facility] on June 16, 2025, and currently remains inpatient.  Once discharged from the hospital, he will require urgent medical appointments on a weekly basis for an undetermined period of time. As a result of his current medical condition, we have advised that he not make any travel plans and/or cancel any upcoming trips for an indefinite amount of time.  Please consider refunding Yimi Gibbs and  spouse Antonia Gibbs for pre-planned travel expenses.      Regards,  LINDSAY Diehl [facility]

## 2025-06-17 ENCOUNTER — APPOINTMENT (OUTPATIENT)
Dept: MRI IMAGING | Facility: CLINIC | Age: 60
End: 2025-06-17
Attending: PHYSICIAN ASSISTANT
Payer: COMMERCIAL

## 2025-06-17 ENCOUNTER — APPOINTMENT (OUTPATIENT)
Dept: CT IMAGING | Facility: CLINIC | Age: 60
End: 2025-06-17
Attending: EMERGENCY MEDICINE
Payer: COMMERCIAL

## 2025-06-17 PROBLEM — I81 PORTAL VEIN THROMBOSIS: Status: ACTIVE | Noted: 2025-06-17

## 2025-06-17 PROBLEM — K70.31 ALCOHOLIC CIRRHOSIS OF LIVER WITH ASCITES (H): Status: ACTIVE | Noted: 2025-06-17

## 2025-06-17 LAB
AFP SERPL-MCNC: 4114 NG/ML
ALBUMIN SERPL BCG-MCNC: 3.4 G/DL (ref 3.5–5.2)
ALP SERPL-CCNC: 530 U/L (ref 40–150)
ALT SERPL W P-5'-P-CCNC: 120 U/L (ref 0–70)
ANION GAP SERPL CALCULATED.3IONS-SCNC: 13 MMOL/L (ref 7–15)
AST SERPL W P-5'-P-CCNC: 436 U/L (ref 0–45)
BILIRUB SERPL-MCNC: 7.3 MG/DL
BUN SERPL-MCNC: 30.8 MG/DL (ref 6–20)
CALCIUM SERPL-MCNC: 8.6 MG/DL (ref 8.8–10.4)
CHLORIDE SERPL-SCNC: 100 MMOL/L (ref 98–107)
CREAT SERPL-MCNC: 0.85 MG/DL (ref 0.67–1.17)
EGFRCR SERPLBLD CKD-EPI 2021: >90 ML/MIN/1.73M2
GLUCOSE SERPL-MCNC: 127 MG/DL (ref 70–99)
HCO3 SERPL-SCNC: 22 MMOL/L (ref 22–29)
INR PPP: 1.24 (ref 0.85–1.15)
MAGNESIUM SERPL-MCNC: 2.2 MG/DL (ref 1.7–2.3)
PHOSPHATE SERPL-MCNC: 4 MG/DL (ref 2.5–4.5)
POTASSIUM SERPL-SCNC: 4.5 MMOL/L (ref 3.4–5.3)
PROT SERPL-MCNC: 6.9 G/DL (ref 6.4–8.3)
PROTHROMBIN TIME: 15.3 SECONDS (ref 11.8–14.8)
SODIUM SERPL-SCNC: 135 MMOL/L (ref 135–145)

## 2025-06-17 PROCEDURE — 36415 COLL VENOUS BLD VENIPUNCTURE: CPT | Performed by: PHYSICIAN ASSISTANT

## 2025-06-17 PROCEDURE — HZ2ZZZZ DETOXIFICATION SERVICES FOR SUBSTANCE ABUSE TREATMENT: ICD-10-PCS | Performed by: INTERNAL MEDICINE

## 2025-06-17 PROCEDURE — 99223 1ST HOSP IP/OBS HIGH 75: CPT | Performed by: INTERNAL MEDICINE

## 2025-06-17 PROCEDURE — 84155 ASSAY OF PROTEIN SERUM: CPT | Performed by: PHYSICIAN ASSISTANT

## 2025-06-17 PROCEDURE — 74177 CT ABD & PELVIS W/CONTRAST: CPT

## 2025-06-17 PROCEDURE — 250N000013 HC RX MED GY IP 250 OP 250 PS 637: Performed by: INTERNAL MEDICINE

## 2025-06-17 PROCEDURE — 250N000013 HC RX MED GY IP 250 OP 250 PS 637: Performed by: PHYSICIAN ASSISTANT

## 2025-06-17 PROCEDURE — 99222 1ST HOSP IP/OBS MODERATE 55: CPT | Performed by: INTERNAL MEDICINE

## 2025-06-17 PROCEDURE — A9585 GADOBUTROL INJECTION: HCPCS | Performed by: PHYSICIAN ASSISTANT

## 2025-06-17 PROCEDURE — 255N000002 HC RX 255 OP 636: Performed by: PHYSICIAN ASSISTANT

## 2025-06-17 PROCEDURE — 85610 PROTHROMBIN TIME: CPT | Performed by: PHYSICIAN ASSISTANT

## 2025-06-17 PROCEDURE — 250N000011 HC RX IP 250 OP 636: Performed by: EMERGENCY MEDICINE

## 2025-06-17 PROCEDURE — 250N000011 HC RX IP 250 OP 636: Mod: JZ | Performed by: INTERNAL MEDICINE

## 2025-06-17 PROCEDURE — 250N000009 HC RX 250: Performed by: EMERGENCY MEDICINE

## 2025-06-17 PROCEDURE — 74183 MRI ABD W/O CNTR FLWD CNTR: CPT

## 2025-06-17 PROCEDURE — 120N000001 HC R&B MED SURG/OB

## 2025-06-17 RX ORDER — HYDRALAZINE HYDROCHLORIDE 10 MG/1
10 TABLET, FILM COATED ORAL EVERY 4 HOURS PRN
Status: DISCONTINUED | OUTPATIENT
Start: 2025-06-17 | End: 2025-06-20 | Stop reason: HOSPADM

## 2025-06-17 RX ORDER — DIAZEPAM 5 MG/1
10 TABLET ORAL EVERY 30 MIN PRN
Status: DISCONTINUED | OUTPATIENT
Start: 2025-06-17 | End: 2025-06-19

## 2025-06-17 RX ORDER — MULTIPLE VITAMINS W/ MINERALS TAB 9MG-400MCG
1 TAB ORAL DAILY
Status: DISCONTINUED | OUTPATIENT
Start: 2025-06-17 | End: 2025-06-20 | Stop reason: HOSPADM

## 2025-06-17 RX ORDER — OLANZAPINE 5 MG/1
5-10 TABLET, ORALLY DISINTEGRATING ORAL EVERY 6 HOURS PRN
Status: DISCONTINUED | OUTPATIENT
Start: 2025-06-17 | End: 2025-06-18

## 2025-06-17 RX ORDER — PANTOPRAZOLE SODIUM 40 MG/1
40 TABLET, DELAYED RELEASE ORAL
Status: DISCONTINUED | OUTPATIENT
Start: 2025-06-17 | End: 2025-06-20 | Stop reason: HOSPADM

## 2025-06-17 RX ORDER — AMOXICILLIN 250 MG
1 CAPSULE ORAL 2 TIMES DAILY PRN
Status: DISCONTINUED | OUTPATIENT
Start: 2025-06-17 | End: 2025-06-20 | Stop reason: HOSPADM

## 2025-06-17 RX ORDER — HYDROMORPHONE HCL IN WATER/PF 6 MG/30 ML
0.4 PATIENT CONTROLLED ANALGESIA SYRINGE INTRAVENOUS
Status: DISCONTINUED | OUTPATIENT
Start: 2025-06-17 | End: 2025-06-19

## 2025-06-17 RX ORDER — DIAZEPAM 10 MG/2ML
5-10 INJECTION, SOLUTION INTRAMUSCULAR; INTRAVENOUS EVERY 30 MIN PRN
Status: DISCONTINUED | OUTPATIENT
Start: 2025-06-17 | End: 2025-06-19

## 2025-06-17 RX ORDER — HYDROMORPHONE HYDROCHLORIDE 1 MG/ML
0.5 INJECTION, SOLUTION INTRAMUSCULAR; INTRAVENOUS; SUBCUTANEOUS ONCE
Status: COMPLETED | OUTPATIENT
Start: 2025-06-17 | End: 2025-06-17

## 2025-06-17 RX ORDER — AMOXICILLIN 250 MG
2 CAPSULE ORAL 2 TIMES DAILY PRN
Status: DISCONTINUED | OUTPATIENT
Start: 2025-06-17 | End: 2025-06-20 | Stop reason: HOSPADM

## 2025-06-17 RX ORDER — FLUMAZENIL 0.1 MG/ML
0.2 INJECTION, SOLUTION INTRAVENOUS
Status: DISCONTINUED | OUTPATIENT
Start: 2025-06-17 | End: 2025-06-20 | Stop reason: HOSPADM

## 2025-06-17 RX ORDER — NALOXONE HYDROCHLORIDE 0.4 MG/ML
0.4 INJECTION, SOLUTION INTRAMUSCULAR; INTRAVENOUS; SUBCUTANEOUS
Status: DISCONTINUED | OUTPATIENT
Start: 2025-06-17 | End: 2025-06-20 | Stop reason: HOSPADM

## 2025-06-17 RX ORDER — SPIRONOLACTONE 25 MG/1
50 TABLET ORAL DAILY
Status: DISCONTINUED | OUTPATIENT
Start: 2025-06-17 | End: 2025-06-20 | Stop reason: HOSPADM

## 2025-06-17 RX ORDER — ONDANSETRON 4 MG/1
4 TABLET, ORALLY DISINTEGRATING ORAL EVERY 6 HOURS PRN
Status: DISCONTINUED | OUTPATIENT
Start: 2025-06-17 | End: 2025-06-20 | Stop reason: HOSPADM

## 2025-06-17 RX ORDER — OXYCODONE HYDROCHLORIDE 5 MG/1
5 TABLET ORAL EVERY 4 HOURS PRN
Status: DISCONTINUED | OUTPATIENT
Start: 2025-06-17 | End: 2025-06-20 | Stop reason: HOSPADM

## 2025-06-17 RX ORDER — CALCIUM CARBONATE 500 MG/1
1000 TABLET, CHEWABLE ORAL 4 TIMES DAILY PRN
Status: DISCONTINUED | OUTPATIENT
Start: 2025-06-17 | End: 2025-06-20 | Stop reason: HOSPADM

## 2025-06-17 RX ORDER — HYDRALAZINE HYDROCHLORIDE 20 MG/ML
10 INJECTION INTRAMUSCULAR; INTRAVENOUS EVERY 4 HOURS PRN
Status: DISCONTINUED | OUTPATIENT
Start: 2025-06-17 | End: 2025-06-20 | Stop reason: HOSPADM

## 2025-06-17 RX ORDER — ONDANSETRON 2 MG/ML
4 INJECTION INTRAMUSCULAR; INTRAVENOUS EVERY 6 HOURS PRN
Status: DISCONTINUED | OUTPATIENT
Start: 2025-06-17 | End: 2025-06-20 | Stop reason: HOSPADM

## 2025-06-17 RX ORDER — HALOPERIDOL 5 MG/ML
2.5-5 INJECTION INTRAMUSCULAR EVERY 6 HOURS PRN
Status: DISCONTINUED | OUTPATIENT
Start: 2025-06-17 | End: 2025-06-18

## 2025-06-17 RX ORDER — GADOBUTROL 604.72 MG/ML
9 INJECTION INTRAVENOUS ONCE
Status: COMPLETED | OUTPATIENT
Start: 2025-06-17 | End: 2025-06-17

## 2025-06-17 RX ORDER — GABAPENTIN 600 MG/1
1200 TABLET ORAL ONCE
Status: CANCELLED | OUTPATIENT
Start: 2025-06-17 | End: 2025-06-17

## 2025-06-17 RX ORDER — FOLIC ACID 1 MG/1
1 TABLET ORAL DAILY
Status: DISCONTINUED | OUTPATIENT
Start: 2025-06-17 | End: 2025-06-20 | Stop reason: HOSPADM

## 2025-06-17 RX ORDER — FUROSEMIDE 20 MG/1
20 TABLET ORAL DAILY
Status: DISCONTINUED | OUTPATIENT
Start: 2025-06-17 | End: 2025-06-20 | Stop reason: HOSPADM

## 2025-06-17 RX ORDER — HYDROMORPHONE HYDROCHLORIDE 1 MG/ML
0.5 INJECTION, SOLUTION INTRAMUSCULAR; INTRAVENOUS; SUBCUTANEOUS ONCE
Status: DISCONTINUED | OUTPATIENT
Start: 2025-06-17 | End: 2025-06-17

## 2025-06-17 RX ORDER — NALOXONE HYDROCHLORIDE 0.4 MG/ML
0.2 INJECTION, SOLUTION INTRAMUSCULAR; INTRAVENOUS; SUBCUTANEOUS
Status: DISCONTINUED | OUTPATIENT
Start: 2025-06-17 | End: 2025-06-20 | Stop reason: HOSPADM

## 2025-06-17 RX ORDER — IOPAMIDOL 755 MG/ML
98 INJECTION, SOLUTION INTRAVASCULAR ONCE
Status: COMPLETED | OUTPATIENT
Start: 2025-06-17 | End: 2025-06-17

## 2025-06-17 RX ORDER — AMLODIPINE BESYLATE 5 MG/1
5 TABLET ORAL DAILY
Status: DISCONTINUED | OUTPATIENT
Start: 2025-06-17 | End: 2025-06-20 | Stop reason: HOSPADM

## 2025-06-17 RX ORDER — LIDOCAINE 40 MG/G
CREAM TOPICAL
Status: DISCONTINUED | OUTPATIENT
Start: 2025-06-17 | End: 2025-06-20 | Stop reason: HOSPADM

## 2025-06-17 RX ORDER — HYDROMORPHONE HCL IN WATER/PF 6 MG/30 ML
0.2 PATIENT CONTROLLED ANALGESIA SYRINGE INTRAVENOUS
Status: DISCONTINUED | OUTPATIENT
Start: 2025-06-17 | End: 2025-06-20 | Stop reason: HOSPADM

## 2025-06-17 RX ADMIN — THIAMINE HCL TAB 100 MG 100 MG: 100 TAB at 09:18

## 2025-06-17 RX ADMIN — HYDROMORPHONE HYDROCHLORIDE 0.5 MG: 1 INJECTION, SOLUTION INTRAMUSCULAR; INTRAVENOUS; SUBCUTANEOUS at 01:00

## 2025-06-17 RX ADMIN — FOLIC ACID 1 MG: 1 TABLET ORAL at 09:18

## 2025-06-17 RX ADMIN — APIXABAN 5 MG: 5 TABLET, FILM COATED ORAL at 20:08

## 2025-06-17 RX ADMIN — AMLODIPINE BESYLATE 5 MG: 5 TABLET ORAL at 09:18

## 2025-06-17 RX ADMIN — FUROSEMIDE 20 MG: 20 TABLET ORAL at 11:04

## 2025-06-17 RX ADMIN — SODIUM CHLORIDE 64 ML: 9 INJECTION, SOLUTION INTRAVENOUS at 01:14

## 2025-06-17 RX ADMIN — PANTOPRAZOLE SODIUM 40 MG: 40 TABLET, DELAYED RELEASE ORAL at 18:52

## 2025-06-17 RX ADMIN — HYDROMORPHONE HYDROCHLORIDE 0.2 MG: 0.2 INJECTION, SOLUTION INTRAMUSCULAR; INTRAVENOUS; SUBCUTANEOUS at 18:56

## 2025-06-17 RX ADMIN — APIXABAN 5 MG: 5 TABLET, FILM COATED ORAL at 14:22

## 2025-06-17 RX ADMIN — SPIRONOLACTONE 50 MG: 25 TABLET ORAL at 11:04

## 2025-06-17 RX ADMIN — IOPAMIDOL 98 ML: 755 INJECTION, SOLUTION INTRAVENOUS at 01:14

## 2025-06-17 RX ADMIN — Medication 1 TABLET: at 09:18

## 2025-06-17 RX ADMIN — GADOBUTROL 9 ML: 604.72 INJECTION INTRAVENOUS at 16:31

## 2025-06-17 ASSESSMENT — ACTIVITIES OF DAILY LIVING (ADL)
WEAR_GLASSES_OR_BLIND: NO
ADLS_ACUITY_SCORE: 49
WALKING_OR_CLIMBING_STAIRS_DIFFICULTY: NO
DIFFICULTY_EATING/SWALLOWING: NO
ADLS_ACUITY_SCORE: 22
CONCENTRATING,_REMEMBERING_OR_MAKING_DECISIONS_DIFFICULTY: NO
ADLS_ACUITY_SCORE: 22
DEPENDENT_IADLS:: INDEPENDENT
ADLS_ACUITY_SCORE: 22
ADLS_ACUITY_SCORE: 22
CHANGE_IN_FUNCTIONAL_STATUS_SINCE_ONSET_OF_CURRENT_ILLNESS/INJURY: NO
FALL_HISTORY_WITHIN_LAST_SIX_MONTHS: NO
ADLS_ACUITY_SCORE: 22
HEARING_DIFFICULTY_OR_DEAF: NO
ADLS_ACUITY_SCORE: 22
ADLS_ACUITY_SCORE: 22
DOING_ERRANDS_INDEPENDENTLY_DIFFICULTY: NO
ADLS_ACUITY_SCORE: 22
DRESSING/BATHING_DIFFICULTY: NO
ADLS_ACUITY_SCORE: 22
TOILETING_ISSUES: NO
ADLS_ACUITY_SCORE: 22
ADLS_ACUITY_SCORE: 22
ADLS_ACUITY_SCORE: 23
DIFFICULTY_COMMUNICATING: NO

## 2025-06-17 NOTE — CONSULTS
[facility]    Hematology/Oncology Consult Note      Date of Admission:  6/16/2025  Date of Consult:  06/18/25  Reason for Consult: HCC      ASSESSMENT/PLAN:  Yimi Gibbs is a 59 year old male admitted with abdominal discomfort, ascites and jaundice      Had a detailed discussion with the patient that he appears to have hepatocellular carcinoma which does not appear to be resectable based on his imaging findings.  I discussed with the patient that I am not a surgeon still worthwhile having him meet with the hepatobiliary surgeon and we can help arrange that  in the OP setting asap      he does have options in terms of systemic chemotherapy and we will get him arranged in the outpatient setting with one of our oncologist that does hepatocellular carcinomaFor further discussion in the next week. Can consider single agent immunotherapy immunotherapy . Or TKIs      Nothing to add from oncology right now. Continue current care ok to discharge when medically appropriate. No need for a biopsy with the AFP levels as consistent with HCC with imaging.  They have a trip coming up to [place] and I discussed with them that I would probably not recommended at this point due to pain issues.  Will write a letter to get them their refund if needed they can let our JENNIFER know tomorrow      Pain management is being addressed by hospitalist        HCC:  --discussed options likely will need palliative systemic therapy and not a candidate for surgery but we will get the surgical team involved and get their input in the outpatient setting    --AFP elevated where there is no need for a biopsy it is diagnostic of HCC    --Outpatient hepatobiliary oncologist (orders in)  I will arrange for new patient scheduling they are consulted inpatient for rounding so will await consult here if they see him tomorrow .     Pain control  -- Pain management per hospitalist team.       Thrombosis portal  With slight drop in hgb  eliquis held discussed w Dr Vila, cytology is currently pending but the Eliquis is being on hold for that reason.  If hemoglobin stable later today she will start him on 2.5 mg twice daily.  High risk of bleeding due to thrombocytopenia and coagulopathy from liver dysfunction      All questions answered to their satisfaction    HISTORY OF PRESENT ILLNESS: Yimi is a 59-year-old male admitted on June 16 with abdominal pain and jaundice.  He also had abdominal ascites.  CT of the abdomen and pelvis was completedWhich showed diffusely abnormal enhancement pattern throughout the liver appearance highly concerning for  infiltrative hepatocellular carcinoma with extensive tumor thrombus in the intrahepatic portal veins extending to extrahepatic portal veins post splenic thrombus.  MRI of the liver showed infiltrative mass centered in the right hepatic lobe measuring up to 15.6 cm likely infiltrated with numerous satellite lesions throughout both lobes of the liver extensive portal thrombus as discussed above.  Mild splenomegaly.  Small volume of ascites.  PatientIs getting paracentesis today.  He was started on Eliquis.  AFP at 4114.      Liver function tests noted Child-Mendez stage B    He states he has had right upper quadrant pain for the last 3 to 4 weeks.  Perhaps has lost some weight but overall appetite is doing okay.   S/p post paracentesis feels better. Pain level positional but can be a 6/10.  On oxycodone prn  Paracentesis was bloody eliquis being held      REVIEW OF SYSTEMS:   14 point ROS was reviewed and is negative other than as noted above in HPI.       MEDICATIONS:  Current Facility-Administered Medications   Medication Dose Route Frequency Provider Last Rate Last Admin    amLODIPine (NORVASC) tablet 5 mg  5 mg Oral Daily Trey Bailey MD   5 mg at 06/18/25 0931    [Held by provider] apixaban ANTICOAGULANT (ELIQUIS) tablet 5 mg  5 mg Oral BID Cony Vila MD   5 mg at 06/18/25 0931     calcium carbonate (TUMS) chewable tablet 1,000 mg  1,000 mg Oral 4x Daily PRN Trey Bailey MD        diazepam (VALIUM) tablet 10 mg  10 mg Oral Q30 Min PRN Trey Bailey MD        Or    diazepam (VALIUM) injection 5-10 mg  5-10 mg Intravenous Q30 Min PRN Trey Bailey MD        flumazenil (ROMAZICON) injection 0.2 mg  0.2 mg Intravenous q1 min prn Trey Bailey MD        folic acid (FOLVITE) tablet 1 mg  1 mg Oral Daily Trey Bailey MD   1 mg at 06/18/25 0931    furosemide (LASIX) tablet 20 mg  20 mg Oral Daily Latasha Moya PA-C   20 mg at 06/18/25 0931    OLANZapine zydis (zyPREXA) ODT tab 5-10 mg  5-10 mg Oral Q6H PRN Trey Bailey MD        Or    haloperidol lactate (HALDOL) injection 2.5-5 mg  2.5-5 mg Intravenous Q6H PRN Trey Bailey MD        hydrALAZINE (APRESOLINE) tablet 10 mg  10 mg Oral Q4H PRN Trey Bailey MD        Or    hydrALAZINE (APRESOLINE) injection 10 mg  10 mg Intravenous Q4H PRN Trey Bailey MD        HYDROmorphone (DILAUDID) injection 0.2 mg  0.2 mg Intravenous Q2H PRN Trey Bailey MD   0.2 mg at 06/17/25 1856    HYDROmorphone (DILAUDID) injection 0.4 mg  0.4 mg Intravenous Q2H PRN Trey Bailey MD        lidocaine (LMX4) cream   Topical Q1H PRN Trey Bailey MD        lidocaine (PF) (XYLOCAINE) 1 % injection 10 mL  10 mL Subcutaneous Once Johnny Purvis MD        lidocaine 1 % 0.1-1 mL  0.1-1 mL Other Q1H PRN Trey Bailey MD        melatonin tablet 5 mg  5 mg Oral QPM PRN Trey Bailey MD        multivitamin w/minerals (THERA-VIT-M) tablet 1 tablet  1 tablet Oral Daily Trey Bailey MD   1 tablet at 06/18/25 0931    naloxone (NARCAN) injection 0.2 mg  0.2 mg Intravenous Q2 Min PRN Trey Bailey MD        Or    naloxone (NARCAN) injection 0.4 mg  0.4 mg Intravenous Q2 Min PRN Trey Bailey,  MD        Or    naloxone (NARCAN) injection 0.2 mg  0.2 mg Intramuscular Q2 Min PRN Trey Bailey MD        Or    naloxone (NARCAN) injection 0.4 mg  0.4 mg Intramuscular Q2 Min PRN Trey Bailey MD        ondansetron (ZOFRAN ODT) ODT tab 4 mg  4 mg Oral Q6H PRN Trey Bailey MD        Or    ondansetron (ZOFRAN) injection 4 mg  4 mg Intravenous Q6H PRN Trey Bailey MD        oxyCODONE (ROXICODONE) tablet 5 mg  5 mg Oral Q4H PRN Trey Bailey MD        oxyCODONE IR (ROXICODONE) half-tab 2.5 mg  2.5 mg Oral Q4H PRN Trey Bailey MD        pantoprazole (PROTONIX) EC tablet 40 mg  40 mg Oral QAM AC Cony Vila MD   40 mg at 06/18/25 0931    senna-docusate (SENOKOT-S/PERICOLACE) 8.6-50 MG per tablet 1 tablet  1 tablet Oral BID PRN Trey Bailey MD        Or    senna-docusate (SENOKOT-S/PERICOLACE) 8.6-50 MG per tablet 2 tablet  2 tablet Oral BID PRN Trey Bailey MD        sodium chloride (PF) 0.9% PF flush 3 mL  3 mL Intracatheter Q8H SHARAD Trey Bailey MD   3 mL at 06/17/25 1422    sodium chloride (PF) 0.9% PF flush 3 mL  3 mL Intracatheter q1 min prn Trey Bailey MD   3 mL at 06/17/25 2008    spironolactone (ALDACTONE) tablet 50 mg  50 mg Oral Daily Latasha Moya PA-C   50 mg at 06/18/25 0930    thiamine (B-1) tablet 100 mg  100 mg Oral Daily Trey Bailey MD   100 mg at 06/18/25 0931         ALLERGIES:  Allergies   Allergen Reactions    Lisinopril Angioedema    Losartan Angioedema         PAST MEDICAL HISTORY:  Past Medical History:   Diagnosis Date    Abductor spasmodic dysphonia     Hyperlipidaemia LDL goal < 100     Hypertension          PAST SURGICAL HISTORY:  Past Surgical History:   Procedure Laterality Date    BUNIONECTOMY  2010    EXCISE MASS FOOT N/A 2/14/2023    Procedure: Fifth Metatarsal Head Resection Left Foot, debridment of Ulcer;  Surgeon: Kerrie Childs DPM;  Location:   OR    SEPTOPLASTY  2007    THYROPLASTY  2001         SOCIAL HISTORY:  Social History     Socioeconomic History    Marital status:      Spouse name: Antonia    Number of children: 2    Years of education: Not on file    Highest education level: Not on file   Occupational History    Occupation:      Employer: [employer]   Tobacco Use    Smoking status: Never    Smokeless tobacco: Never   Vaping Use    Vaping status: Never Used   Substance and Sexual Activity    Alcohol use: Yes     Comment: socially    Drug use: No    Sexual activity: Not on file   Other Topics Concern    Not on file   Social History Narrative    Lives with wife, 2 kids, dog.     Son (Bret) and daughter (Latasha)    Work: supervisor [employer]    Exercise: walking    Alcohol: social     Social Drivers of Health     Financial Resource Strain: Low Risk  (6/17/2025)    Financial Resource Strain     Within the past 12 months, have you or your family members you live with been unable to get utilities (heat, electricity) when it was really needed?: No   Food Insecurity: Low Risk  (6/17/2025)    Food Insecurity     Within the past 12 months, did you worry that your food would run out before you got money to buy more?: No     Within the past 12 months, did the food you bought just not last and you didn t have money to get more?: No   Transportation Needs: Low Risk  (6/17/2025)    Transportation Needs     Within the past 12 months, has lack of transportation kept you from medical appointments, getting your medicines, non-medical meetings or appointments, work, or from getting things that you need?: No   Physical Activity: Sufficiently Active (6/10/2024)    Exercise Vital Sign     Days of Exercise per Week: 4 days     Minutes of Exercise per Session: 60 min   Stress: No Stress Concern Present (6/10/2024)    [employer] - Occupational Stress Questionnaire     Feeling of Stress : Not at all   Social Connections: Unknown  "(6/10/2024)    Social Connection and Isolation Panel [NHANES]     Frequency of Communication with Friends and Family: Not on file     Frequency of Social Gatherings with Friends and Family: Once a week     Attends [employer] Services: Not on file     Active Member of Clubs or Organizations: Not on file     Attends Club or Organization Meetings: Not on file     Marital Status: Not on file   Interpersonal Safety: Low Risk  (2025)    Interpersonal Safety     Do you feel physically and emotionally safe where you currently live?: Yes     Within the past 12 months, have you been hit, slapped, kicked or otherwise physically hurt by someone?: No     Within the past 12 months, have you been humiliated or emotionally abused in other ways by your partner or ex-partner?: No   Housing Stability: Low Risk  (2025)    Housing Stability     Do you have housing? : Yes     Are you worried about losing your housing?: No         FAMILY HISTORY:  Family History   Problem Relation Age of Onset    Prostate Cancer Mother          PHYSICAL EXAM:  Vital signs:  Temp: 98.9  F (37.2  C) Temp src: Oral BP: 120/83 Pulse: 80   Resp: 18 SpO2: 95 % O2 Device: None (Room air)   Height: 188 cm (6' 2\") Weight: 87.9 kg (193 lb 12.8 oz)  Estimated body mass index is 24.88 kg/m  as calculated from the following:    Height as of this encounter: 1.88 m (6' 2\").    Weight as of this encounter: 87.9 kg (193 lb 12.8 oz).    ECO  GENERAL/CONSTITUTIONAL: No acute distress.  Cachectic with muscle wasting  EYES: Jaundice  ENT/MOUTH: Neck supple. Oropharynx clear, no mucositis.  LYMPH: No anterior cervical, posterior cervical, supraclavicular, axillary or inguinal adenopathy.   RESPIRATORY: Clear to auscultation bilaterally. No crackles or wheezing.   CARDIOVASCULAR: Regular rate and rhythm without murmurs, gallops, or rubs.  GASTROINTESTINAL: Ascites MUSCULOSKELETAL: Warm and well-perfused, no cyanosis, clubbing, or edema.  NEUROLOGIC: Cranial nerves " II-XII are intact. Alert, oriented, answers questions appropriately.  INTEGUMENTARY: No rashes or jaundice.  GAIT: Steady, does not use assistive device      LABS:  CBC RESULTS:   Recent Labs   Lab Test 06/18/25  0834   WBC 5.0   RBC 3.88*   HGB 12.3*   HCT 37.3*   MCV 96   MCH 31.7   MCHC 33.0   RDW 16.5*   PLT 66*       Recent Labs   Lab Test 06/18/25  0834 06/17/25  1040   * 135   POTASSIUM 4.6 4.5   CHLORIDE 98 100   CO2 22 22   ANIONGAP 13 13   GLC 97 127*   BUN 31.4* 30.8*   CR 0.92 0.85   THANG 8.8 8.6*         PATHOLOGY:  none    IMAGING:    noted    Total time spent on day of visit, including review of tests, obtaining/reviewing separately obtained history, ordering medications/tests/procedures, communicating with PCP/consultants, and documenting in electronic medical record: 80 minutes     Thank you for the opportunity to participate in this patient's care.  Please call with any questions.    Bryon Cummings MD  Hematology/Oncology  [facility]

## 2025-06-17 NOTE — PLAN OF CARE
Goal Outcome Evaluation:         Summary:  Alcoholic cirrhosis, ascites, Portal vein thrombosis  DATE & TIME: 6/17/25 [id number]    Cognitive Concerns/ Orientation : A&Ox4    BEHAVIOR & AGGRESSION TOOL COLOR: GREEN  CIWA SCORE: 0   ABNL VS/O2: VSS RA   MOBILITY: Independent   PAIN MANAGMENT: Denies this shift, came in with RUQ abdomen pain  DIET: Regular   BOWEL/BLADDER: Continent, ambulating to bathroom, abdomen distended   ABNL LAB/BG: ,  INR 1.17 Lipase 84  DRAIN/DEVICES: R PIV SL   TELEMETRY RHYTHM: NA  SKIN: Intact, jaundice  TESTS/PROCEDURES:CT abdomen completed 6/17  D/C DAY/GOALS/PLACE: pending improvement   OTHER IMPORTANT INFO: GI/SW consulted   Eth <0.01, last drink 6/16

## 2025-06-17 NOTE — PROGRESS NOTES
Admission    Patient arrives to room 628 via cart from ED.      Inpatient nursing criteria listed below were met:    Did you put disposition on whiteboard and in sticky note: Yes  Full skin assessment done (add LDA if skin issue present). Initials of 2nd RN :Yes MOISÉS  Isolation education started/completed NA  Patient allergies verified with patient: Yes  Fall Risk? (Care plan updated, Education given and documented) No  Primary Care Plan initiated: Yes  Home medications documented in belongings flowsheet: NA  Patient belongings documented in belongings flowsheet: Yes  Reminder note (belongings/ medications) placed in discharge instructions:Yes  Admission profile/ required documentation complete: Yes  If patient is a 72 hour hold/Commitment are belongings removed from room and locked up? NA

## 2025-06-17 NOTE — ED PROVIDER NOTES
Emergency Department Note      History of Present Illness     Chief Complaint   Abdominal Pain and Jaundice      HPI   Yimi Gibbs is a 59 year old male with a history of essential hypertension who reports for evaluation of abdominal pain and jaundice. Patient reports he has been feeling very fatigued for a bit and hasn't been sleeping well. Adds he is also experiencing swelling in his upper right abdomen, but notes that he goes to the bathroom regularly. Patient's wife endorses that he has been tired for a few weeks now and she has noticed his jaundice especially when they are out in the sun. She states that he has had no past liver issues or diagnosis, but his liver is constantly being monitored due his past history of alcohol abuse. Patient states his last drink of alcohol was yesterday.  He did have a drink of alcohol yesterday, but states that he has not been drinking heavily.    Wife reports he has dealt with seizures in the past.     Independent Historian   Wife as detailed above.    Review of External Notes   I reviewed discharge summary from 2/24/2025.  Patient was admitted with seizures, likely due to alcohol withdrawal.    Past Medical History     Medical History and Problem List   Dyslipidemia  Essential hypertension  Chronic allergic bronchitis  Hepatic fibrosis  Hepatic steatosis  Seizure    Medications   Amlodipine    Surgical History   Bunionectomy  Septoplasty  Thyroplasty  Excise mass foot    Physical Exam     Patient Vitals for the past 24 hrs:   BP Temp Temp src Pulse Resp SpO2   06/16/25 1812 (!) 144/87 97.7  F (36.5  C) Temporal 84 16 98 %     Physical Exam  General: alert, lying comfortably on gurney  HENT: mucous membranes moist, scleral icterus present.  CV: regular rate, regular rhythm  Resp: normal effort, clear throughout, no crackles or wheezing  GI: abdomen soft and nontender, no guarding.  Positive fluid wave with mild abdominal distention.  MSK: no bony tenderness  Skin:  appropriately warm and dry, jaundice  Extremities: no edema, calves non-tender  Neuro: alert, clear speech, oriented  Psych: normal mood and affect      Diagnostics     Lab Results   Labs Ordered and Resulted from Time of ED Arrival to Time of ED Departure   COMPREHENSIVE METABOLIC PANEL - Abnormal       Result Value    Sodium 136      Potassium 5.0      Carbon Dioxide (CO2) 24      Anion Gap 12      Urea Nitrogen 30.3 (*)     Creatinine 1.04      GFR Estimate 83      Calcium 9.1      Chloride 100      Glucose 116 (*)     Alkaline Phosphatase 582 (*)      (*)      (*)     Protein Total 7.6      Albumin 3.8      Bilirubin Total 6.8 (*)    CBC WITH PLATELETS AND DIFFERENTIAL - Abnormal    WBC Count 6.2      RBC Count 3.85 (*)     Hemoglobin 12.1 (*)     Hematocrit 37.3 (*)     MCV 97      MCH 31.4      MCHC 32.4      RDW 16.1 (*)     Platelet Count 88 (*)     % Neutrophils 71      % Lymphocytes 17      % Monocytes 8      % Eosinophils 2      % Basophils 1      % Immature Granulocytes 1      NRBCs per 100 WBC 0      Absolute Neutrophils 4.4      Absolute Lymphocytes 1.1      Absolute Monocytes 0.5      Absolute Eosinophils 0.1      Absolute Basophils 0.0      Absolute Immature Granulocytes 0.1      Absolute NRBCs 0.0     LIPASE - Abnormal    Lipase 84 (*)    INR - Abnormal    INR 1.17 (*)     PT 14.6     ETHANOL LEVEL BLOOD - Normal    Ethanol Level Blood <0.01         Imaging   US Abdomen Limited   Final Result   IMPRESSION:   1.  Cirrhotic liver with portal vein thrombosis.   2.  Small to moderate amount of ascites.   3.  Gallbladder wall thickening may be reactive secondary to ascites. No gallstones or sludge.            CT Abdomen Pelvis w Contrast    (Results Pending)       EKG   none    Independent Interpretation   None    ED Course      Medications Administered   Medications - No data to display    Procedures   Procedures     Discussion of Management   Admitting Hospitalist,   Leo  Gastroenterology, Dr. Knight.    ED Course   ED Course as of 06/17/25 0132   Mon Jun 16, 2025 2121 I evaluated the patient and obtained the history as noted above     2225 I spoke to Dr. Knight with GI.  He does not recommend anticoagulation for the portal vein thrombosis.  Does not recommends admission.  He will see the patient in the ED.     2320 I rechecked and reupdated the patient.         Additional Documentation  None    Medical Decision Making / Diagnosis     CMS Diagnoses: None    MIPS   None       MDM   Yimi Gibbs is a 59 year old male with history of alcohol withdrawal seizures, chronic LFT elevations, presenting today with abdominal distention and jaundice.  On exam, the patient has normal vitals.  He does have obvious jaundice, and ascites on exam.  Workup is notable for labs demonstrating new liver failure, with elevated bilirubin, and elevated INR.  Alkaline phosphatase, AST, and ALT are elevated as well.  Patient does appear to be thrombocytopenic, slightly anemic, though no evidence for ongoing blood loss.  Right upper quadrant ultrasound is negative for signs of cholecystitis, or ductal dilatation, however, the patient was noted to have portal vein thrombosis.  I reviewed these findings with Dr. Hernandez, who recommends admission for further evaluation.  CT abdomen pelvis is pending, will be followed up by hospitalist.  Given rapid disease progression, the patient will be admitted for further evaluation by gastroenterology under hospitalist care.    Disposition   The patient was admitted to the hospital.     Diagnosis     ICD-10-CM    1. Alcoholic cirrhosis of liver with ascites (H)  K70.31       2. Portal vein thrombosis  I81              Scribe Disclosure:  I, Yonatan Mcleod, am serving as a scribe at 9:38 PM on 6/16/2025 to document services personally performed by Leesa Spivey MD based on my observations and the provider's statements to me.        Leesa Spivey  MD Josefina  06/17/25 0137

## 2025-06-17 NOTE — PLAN OF CARE
Goal Outcome Evaluation:       Summary:  Alcoholic cirrhosis, ascites, Portal vein thrombosis  DATE & TIME: 6/17/25 [id number]    Cognitive Concerns/ Orientation : A&Ox4    BEHAVIOR & AGGRESSION TOOL COLOR: GREEN  CIWA SCORE: 0, 0, 0   ABNL VS/O2: VSS RA   MOBILITY: Independent   PAIN MANAGMENT: C/o 4/10 right sided abdominal pain- declines interventions   DIET: Regular - tolerating   BOWEL/BLADDER: Continent, ambulating to bathroom, abdomen distended   ABNL LAB/BG: , , Bili total 7.3, INR 1.24  DRAIN/DEVICES: R PIV SL   TELEMETRY RHYTHM: NA  SKIN: Intact, jaundice  TESTS/PROCEDURES:CT abdomen completed 6/17, MRI liver ordered   D/C DAY/GOALS/PLACE: pending improvement   OTHER IMPORTANT INFO: GI/SW following.  Eth <0.01, last drink 6/16.  Started on Eliquis and diuretics today.  Trace edema of legs.  Denies SOB.  Denies nausea.

## 2025-06-17 NOTE — PHARMACY-ADMISSION MEDICATION HISTORY
Pharmacist Admission Medication History    Admission medication history is complete. The information provided in this note is only as accurate as the sources available at the time of the update.    Information Source(s): Patient and CareEverywhere/SureScripts via in-person    Pertinent Information: Patient reported he is not currently taking Keppra.    Changes made to PTA medication list:  Added: None  Deleted: Folic Acid, Multivitamin  Changed: None    Medication History Completed By: Ashley Onofre RPH 6/16/2025 9:50 PM    PTA Med List   Medication Sig Last Dose/Taking    albuterol (PROAIR HFA/PROVENTIL HFA/VENTOLIN HFA) 108 (90 Base) MCG/ACT inhaler Inhale 2 puffs into the lungs every 6 hours as needed for shortness of breath / dyspnea or wheezing Taking As Needed    amLODIPine (NORVASC) 5 MG tablet Take 1 tablet (5 mg) by mouth daily. 6/16/2025 Morning    cetirizine (ZYRTEC) 10 MG tablet Take 10 mg by mouth daily as needed for allergies. Taking As Needed

## 2025-06-17 NOTE — CONSULTS
Care Management Initial Consult    General Information  Assessment completed with: Patient, Spouse or significant other, Ashley  Type of CM/SW Visit: Initial Assessment    Primary Care Provider verified and updated as needed: Yes   Readmission within the last 30 days: no previous admission in last 30 days      Reason for Consult: discharge planning  Advance Care Planning: Advance Care Planning Reviewed: questions answered, no concerns identified          Communication Assessment  Patient's communication style: spoken language (English or Bilingual)    Hearing Difficulty or Deaf: no   Wear Glasses or Blind: no    Cognitive  Cognitive/Neuro/Behavioral: WDL                      Living Environment:   People in home: spouse  Antonia  Current living Arrangements: house      Able to return to prior arrangements: yes       Family/Social Support:  Care provided by: self, spouse/significant other  Provides care for: no one  Marital Status:   Support system: Wife, Children  Antonia       Description of Support System: Supportive, Involved         Current Resources:   Patient receiving home care services: No        Community Resources: None  Equipment currently used at home: none  Supplies currently used at home: None    Employment/Financial:  Employment Status: employed full-time        Financial Concerns: none   Referral to Financial Worker: No       Does the patient's insurance plan have a 3 day qualifying hospital stay waiver?  No    Lifestyle & Psychosocial Needs:  Social Drivers of Health     Food Insecurity: Low Risk  (6/17/2025)    Food Insecurity     Within the past 12 months, did you worry that your food would run out before you got money to buy more?: No     Within the past 12 months, did the food you bought just not last and you didn t have money to get more?: No   Depression: Not at risk (3/7/2025)    PHQ-2     PHQ-2 Score: 0   Housing Stability: Low Risk  (6/17/2025)    Housing Stability     Do  you have housing? : Yes     Are you worried about losing your housing?: No   Tobacco Use: Low Risk  (3/7/2025)    Patient History     Smoking Tobacco Use: Never     Smokeless Tobacco Use: Never     Passive Exposure: Not on file   Financial Resource Strain: Low Risk  (6/17/2025)    Financial Resource Strain     Within the past 12 months, have you or your family members you live with been unable to get utilities (heat, electricity) when it was really needed?: No   Alcohol Use: Not on file   Transportation Needs: Low Risk  (6/17/2025)    Transportation Needs     Within the past 12 months, has lack of transportation kept you from medical appointments, getting your medicines, non-medical meetings or appointments, work, or from getting things that you need?: No   Physical Activity: Sufficiently Active (6/10/2024)    Exercise Vital Sign     Days of Exercise per Week: 4 days     Minutes of Exercise per Session: 60 min   Interpersonal Safety: Low Risk  (6/17/2025)    Interpersonal Safety     Do you feel physically and emotionally safe where you currently live?: Yes     Within the past 12 months, have you been hit, slapped, kicked or otherwise physically hurt by someone?: No     Within the past 12 months, have you been humiliated or emotionally abused in other ways by your partner or ex-partner?: No   Stress: No Stress Concern Present (6/10/2024)    [place] of [employer]ational [employer] - Occupational Stress Questionnaire     Feeling of Stress : Not at all   Social Connections: Unknown (6/10/2024)    Social Connection and Isolation Panel [NHANES]     Frequency of Communication with Friends and Family: Not on file     Frequency of Social Gatherings with Friends and Family: Once a week     Attends [employer] Services: Not on file     Active Member of Clubs or Organizations: Not on file     Attends Club or Organization Meetings: Not on file     Marital Status: Not on file   Health Literacy: Not on file       Functional  Status:  Prior to admission patient needed assistance:   Dependent ADLs:: Independent  Dependent IADLs:: Independent       Mental Health Status:  Mental Health Status: No Current Concerns       Chemical Dependency Status:  Chemical Dependency Status: Current Concern             Values/Beliefs:  Spiritual, Cultural Beliefs, Mosque Practices, Values that affect care: no               Discussed  Partnership in Safe Discharge Planning  document with patient/family: Yes: Ashley (wife)    Additional Information:  Writer met with patient and spouse to introduce self and role in discharge planning. Ruben confirmed his address, phone, PCP, and insurance. He lives in a house with his wife, works full-time from home, and is independent. He and Antonia had questions about FMLA, writer told them this should be completed with his PMD as work often has follow up questions, and our hospital doctors would be difficult to reach. Writer also encouraged Antonia to apply as Ruben's caregiver. No other needs were identified. Writer encouraged both of them to reach out if anything comes up. No further care management intervention anticipated at this time.  Please re-consult if further needs arise.  Care management signing off.        Next Steps: home-no needs when medically cleared    Delores Connors, RN Care Coordinator  [facility]  [phone]

## 2025-06-17 NOTE — CONSULTS
Patient has [employer]BS ([employer]) through an employer.    Xarelto/Eliquis:  $0/mo.     Liliam Madrid  Pharmacy Technician/Liaison, Discharge Pharmacy   [phone] (voice or text)  [email or web address]  Pharmacy test claims are estimates and may not reflect final costs.  Suggested alternatives aim to be cost-effective and may not be therapeutically equivalent as this consult is informational and does not constitute medical advice.  Clinical decisions should be made by qualified healthcare providers.

## 2025-06-17 NOTE — PROGRESS NOTES
No charge note today.  Chart reviewed.  Patient seen and examined and discussed with the GI team with Latasha Moya and patient's wife at bedside.  59-year-old male with history of alcoholic cirrhosis presented with worsening abdominal distention and right upper quadrant abdominal pain.  Noted to have portal vein thrombosis and elevated LFTs.  Patient is thrombocytopenic with a platelet count of 88K, hemoglobin is low at 12.1.  There is concern for possible hepatocellular cancer of her GI team.  MRI of the liver ordered.  AFP tumor marker returned elevated at 4114.  Started on Eliquis 5 mg p.o. twice daily due to thrombocytopenia due to high risk of bleeding as patient has good coverage with $0 per month per pharmacy.  Continue current cares.  Care plan discussed with patient and his wife are agreeable.    Cony Vila MD   Hospitalist

## 2025-06-17 NOTE — PROGRESS NOTES
.RECEIVING UNIT ED HANDOFF REVIEW    ED Nurse Handoff Report was reviewed by: Deena Madrid RN on June 17, 2025 at 1:25 AM

## 2025-06-17 NOTE — H&P
[facility]    History and Physical - Hospitalist Service       Date of Admission:  6/16/2025    Assessment & Plan      Yimi Gibbs is a 59 year old male admitted on 6/16/2025. He presents with abdominal pain and jaundice    PV thrombosis  Cirrhosis (? Alcoholic)  Transaminitis  Elevated bilirubin  Notes feeling fatigued for several weeks, notes swelling in R upper abdomen. Wife has noted jaundice as well. No known hx liver disease but has hx Etoh use. Some nausea, no vomiting. No other abd pain other than RUQ. No f/c. In ED AFVSS. AST//126 (up from previous). Alk phos 582. TB 6.8 (1.3 3/2025). lipase 84. INR 1.17.   *US abdomen w/ cirrhosis, portal vein thrombosis, small/ mod ascites. GB wall thickening may be 2/2 ascites, no stones or sludge.   - GI consult  - CT a/p pending  - prn analgesics  - no anticoagulation per Dr. Knight at this time  - monitor LFTs    Alcohol use disorder  Suspected alcohol withdrawal seizures  Hospitalized in 2/2025 w/ possible Etoh withdrawal seizures. Reports last Etoh yesterday, one drink. States recently not drinking regularly. Historically has drank more but is vague on this (previous notes indicate wife has noted excessive drinking, mostly beer). Etoh on presentation negative. Currently not taking keppra.   - CIWA  - prn diazepam  - vitamins    HTN  HLD  - resume amlodipine 5 mg daily    Thrombocytopenia  Plts 88, likely 2/2 liver disease. Previous 159 2/2025. .     Spasmodic dysphonia  noted        Diet:  regular  DVT Prophylaxis: Pneumatic Compression Devices  Gomez Catheter: Not present  Lines: None     Cardiac Monitoring: None  Code Status:  Full    Clinically Significant Risk Factors Present on Admission                # Coagulation Defect: INR = 1.17 (Ref range: 0.85 - 1.15) and/or PTT = N/A, will monitor for bleeding  # Thrombocytopenia: Lowest platelets = 88 in last 2 days, will monitor for bleeding   # Hypertension: Noted on  problem list               # Asthma: noted on problem list        Disposition Plan     Medically Ready for Discharge: Anticipated in 2-4 Days           Trey Bailey MD  Hospitalist Service  [facility]  Securely message with [employer] (more info)  Text page via [employer] Paging/Directory     ______________________________________________________________________    Chief Complaint   Abdominal pain    History is obtained from the patient, electronic health record, and emergency department physician    History of Present Illness   Yimi Gibbs is a 59 year old male who presents with abdominal pain.  He has a history of cirrhosis and is followed by Dr. Hernandez.  He also apparently has a history of alcohol use disorder although this is not entirely quantified.  He reports that he has had several weeks of fatigue.  Then in the last week he started to develop right upper quadrant abdominal pain.  The pain makes it so that he really cannot sleep at night.  His wife is also noted recently that his skin has been yellow.  Patient is a bit vague on history.  He has had some nausea but no vomiting.  He states his bowels have been normal.  He does not think  his abdomen is more distended than usual.  He has noted slight lower extremity edema.  He has had no chest pain or shortness of breath.  Denies any fevers or chills.  When discussing alcohol he states recently he really has not had much to drink.  He had  an alcoholic beverage yesterday and 3 days ago.  Then he states the last 1 prior was a couple weeks ago.  Then he does not remember when the last 1 before that was.  He also notes record his wife stated that he was drinking more than he was telling us.      Past Medical History    Past Medical History:   Diagnosis Date    Abductor spasmodic dysphonia     Hyperlipidaemia LDL goal < 100     Hypertension        Past Surgical History   Past Surgical History:   Procedure Laterality Date     BUNIONECTOMY  2010    EXCISE MASS FOOT N/A 2/14/2023    Procedure: Fifth Metatarsal Head Resection Left Foot, debridment of Ulcer;  Surgeon: Kerrie Childs DPM;  Location: [facility] OR    SEPTOPLASTY  2007    THYROPLASTY  2001       Prior to Admission Medications   Prior to Admission Medications   Prescriptions Last Dose Informant Patient Reported? Taking?   albuterol (PROAIR HFA/PROVENTIL HFA/VENTOLIN HFA) 108 (90 Base) MCG/ACT inhaler  Self No Yes   Sig: Inhale 2 puffs into the lungs every 6 hours as needed for shortness of breath / dyspnea or wheezing   amLODIPine (NORVASC) 5 MG tablet 6/16/2025 Morning Self No Yes   Sig: Take 1 tablet (5 mg) by mouth daily.   cetirizine (ZYRTEC) 10 MG tablet  Self Yes Yes   Sig: Take 10 mg by mouth daily as needed for allergies.   levETIRAcetam (KEPPRA) 500 MG tablet Not Taking Self No No   Sig: Take 1 tablet (500 mg) by mouth 2 times daily.   Patient not taking: Reported on 6/16/2025      Facility-Administered Medications: None        Review of Systems    The 10 point Review of Systems is negative other than noted in the HPI or here.     Social History   I have reviewed this patient's social history and updated it with pertinent information if needed.  Social History     Tobacco Use    Smoking status: Never    Smokeless tobacco: Never   Vaping Use    Vaping status: Never Used   Substance Use Topics    Alcohol use: Yes     Comment: socially    Drug use: No        Physical Exam   Vital Signs: Temp: 97.7  F (36.5  C) Temp src: Temporal BP: 121/79 Pulse: 78   Resp: 18 SpO2: 97 % O2 Device: None (Room air)    Weight: 0 lbs 0 oz    General Appearance: Alert, very pleasant, no distress  Respiratory: CTA B  Cardiovascular: RRR, no murmur. Trace ankle edema  GI: soft, tender RUQ to deep touch. No other abdominal pain  Skin: no rashes or lesions grossly. Jaundice noted.   Other: CN grossly intact, LONG      Medical Decision Making       70 MINUTES SPENT BY ME on the date of service doing chart  review, history, exam, documentation & further activities per the note.      Data   ------------------------- PAST 24 HR DATA REVIEWED -----------------------------------------------    I have personally reviewed the following data over the past 24 hrs:    6.2  \   12.1 (L)   / 88 (L)     136 100 30.3 (H) /  116 (H)   5.0 24 1.04 \     ALT: 126 (H) AST: 454 (H) AP: 582 (H) TBILI: 6.8 (H)   ALB: 3.8 TOT PROTEIN: 7.6 LIPASE: 84 (H)     INR:  1.17 (H) PTT:  N/A   D-dimer:  N/A Fibrinogen:  N/A       Imaging results reviewed over the past 24 hrs:   Recent Results (from the past 24 hours)   US Abdomen Limited    Narrative    EXAM: US ABDOMEN LIMITED  LOCATION: [facility]  DATE: 6/16/2025    INDICATION: liver failure  COMPARISON: None.  TECHNIQUE: Limited abdominal ultrasound.    FINDINGS:    GALLBLADDER: Normal. No gallstones or sludge. Pericholecystic fluid or ascites. Gallbladder wall is thickened. This may be reactive secondary to ascites. Clinical correlation recommended. Negative sonographic Hinojosa's sign.    BILE DUCTS: No biliary dilatation. The common duct measures 3.8 mm.    LIVER: Coarsened echotexture, suggesting underlying fibrosis. Nodular contour. No focal mass. Portal vein thrombosis with trickle flow seen centrally.    RIGHT KIDNEY: No hydronephrosis.    PANCREAS: Bowel gas obscures portions of pancreas. The visualized portions are normal.    Small to moderate amount of ascites.      Impression    IMPRESSION:  1.  Cirrhotic liver with portal vein thrombosis.  2.  Small to moderate amount of ascites.  3.  Gallbladder wall thickening may be reactive secondary to ascites. No gallstones or sludge.

## 2025-06-17 NOTE — CONSULTS
[facility]  Gastroenterology Consultation         Yimi Gibbs  [address] [id number]  59 year old male    Admission Date/Time: 6/16/2025  Primary Care Provider: Doug Subramanian  Referring / Attending Physician:  Dr. Trey aBiley    We were asked to see the patient in consultation by Dr. Trey Bailey for evaluation of portal vein thrombosis, cirrhosis with jaundice, abnl LFTs.      CC: abdominal pain and jaunidce    HPI:  Yimi Gibbs is a 59 year old male with history of alcohol abuse, alcoholic liver cirrhosis who presented with abdominal pain. He reports that he has had several weeks of fatigue, mild abdominal distention.  Then in the last week he started to develop right upper quadrant abdominal pain that makes it difficult to sleep.  His wife is also noted recently that his skin has been jaundice.  He has had some nausea but no vomiting.  He states his bowels have been normal.  He has noted slight lower extremity edema.  He has had no chest pain or shortness of breath.  Denies any fevers or chills.  His alcohol intake is vague as story changes, per wife seems to have been drinking possibly more lately.    ROS: A comprehensive ten point review of systems was negative aside from those in mentioned in the HPI.      PAST MED HX:  I have reviewed this patient's medical history and updated it with pertinent information if needed.   Past Medical History:   Diagnosis Date    Abductor spasmodic dysphonia     Hyperlipidaemia LDL goal < 100     Hypertension        MEDICATIONS:   Prior to Admission Medications   Prescriptions Last Dose Informant Patient Reported? Taking?   albuterol (PROAIR HFA/PROVENTIL HFA/VENTOLIN HFA) 108 (90 Base) MCG/ACT inhaler  Self No Yes   Sig: Inhale 2 puffs into the lungs every 6 hours as needed for shortness of breath / dyspnea or wheezing   amLODIPine (NORVASC) 5 MG tablet 6/16/2025 Morning Self No Yes   Sig:  Take 1 tablet (5 mg) by mouth daily.   cetirizine (ZYRTEC) 10 MG tablet  Self Yes Yes   Sig: Take 10 mg by mouth daily as needed for allergies.   levETIRAcetam (KEPPRA) 500 MG tablet Not Taking Self No No   Sig: Take 1 tablet (500 mg) by mouth 2 times daily.   Patient not taking: Reported on 6/16/2025      Facility-Administered Medications: None       ALLERGIES:   Allergies   Allergen Reactions    Lisinopril Angioedema    Losartan Angioedema       SOCIAL HISTORY:  Social History     Tobacco Use    Smoking status: Never    Smokeless tobacco: Never   Vaping Use    Vaping status: Never Used   Substance Use Topics    Alcohol use: Yes     Comment: socially    Drug use: No       FAMILY HISTORY:  Family History   Problem Relation Age of Onset    Prostate Cancer Mother        PHYSICAL EXAM:   General  alert, oriented and comfortable  Vital Signs with Ranges  Temp: 98.6  F (37  C) Temp src: Oral BP: 122/74 Pulse: 78   Resp: 18 SpO2: 94 % O2 Device: None (Room air)    No intake/output data recorded.    Constitutional: Alert, oriented to person, place, date, situation.  Cooperative, lying in bed in NAD.   Respiratory:  Lungs CTAB.  No crackles, wheezes, or rhonchi, no labored breathing.  Cardiovascular:  Heart RRR, no MRG, no edema.  GI:  Abdomen soft, tender in RUQ, distended moderately and with normoactive BS  Skin/Integumen:  Warm, dry, non-diaphoretic. Jaundice  MSK: CMS x4 intact.        ADDITIONAL COMMENTS:   I reviewed the patient's new clinical lab test results.   Recent Labs   Lab Test 06/16/25 1818 02/24/25  0815 02/23/25  0209   WBC 6.2 5.0 5.3   HGB 12.1* 15.1 14.5   MCV 97 91 90   PLT 88* 159 176   INR 1.17*  --   --      Recent Labs   Lab Test 06/16/25 1818 03/07/25  1443 02/24/25  0815   POTASSIUM 5.0 4.9 3.4   CHLORIDE 100 99 91*   CO2 24 27 24   BUN 30.3* 13.1 10.0   ANIONGAP 12 13 15     Recent Labs   Lab Test 06/16/25 1818 03/07/25  1443 02/24/25  0815 06/11/24  0838 10/12/20  1113   ALBUMIN 3.8 4.8 4.2    < > 4.7   BILITOTAL 6.8* 1.3* 1.9*   < > 0.9   BILIDIRECT  --   --   --   --  0.20   * 126* 115*   < > 138*   * 107* 123*   < > 104*   LIPASE 84*  --   --   --   --     < > = values in this interval not displayed.       I reviewed the patient's new imaging results.        CONSULTATION ASSESSMENT AND PLAN:    Yimi Gibbs is a 59 year old male admitted on 6/16/2025. He presents with abdominal pain and jaundice     Portal vein thrombosis  Alcoholic cirrhosis of liver with ascites (H)  Liver masses- concerning for HCC  Notes feeling fatigued for several weeks, notes swelling in R upper abdomen   AST//126 (up from previous). Alk phos 582. TBili 6.8 (1.3 3/2025). lipase 84. INR 1.17.   CT A/P: 1.  Diffusely abnormal enhancement pattern throughout the liver, appearance highly concerning for infiltrative hepatocellular carcinoma. 2.  Extensive tumor thrombus in the intrahepatic portal veins, extending into the extrahepatic portal vein, portosplenic confluence, and superior mesenteric vein.  3.  Small to moderate volume ascites. Mild splenomegaly.  Findings concerning for liver cirrhosis with possible Hepatocellular carcinoma complicated by hypercoagulability with evidence of thrombus within portal vein extending to SMA  MELD 17    - MRI of liver  - collect AFP  - start AC- discussed with Dr Vila whom will consult pharmacy  - start diuretics (furosemide 20 mg and spironolactone 50 mg) -plan to titrate based on tolerability  - daily CMP, INR, CBC          [facility].  Office: [phone]  Cell : [phone] (Dr. Knight)  Cell: [phone] (Latasha Moya PA-C)

## 2025-06-17 NOTE — DISCHARGE INSTRUCTIONS
HEALTHCARE DIRECTIVE:   For information on completing a healthcare directive (all adults should complete one) please visit [email or web address]   A direct link to the forms you can print and guides for how to complete them can be found at: [email or web address]     To make it valid, please either 1) have it signed by 2 witnesses or 2) have it signed by a notary.  Then make copies to provide to your Primary Care Provider and any other care providers.      CD Treatment Contact Info    Mental Health and Addiction Services Line: [phone] Appt through [employer].    [facility]" CD Outpatient  [facility] OP Admissions  Radha Tio - [phone]  [email or web address]    [facility] Virtual [facility]  Phone: [phone]  Fax: [phone]    [facility]   [email or web address]  Phone: [phone]  Fax: [phone]    [facility] in [place] (formerly [place])  Phone: [phone]  Fax: [phone]    Supportive Services    [facility]   [email or web address]    [place] [facility]  [address] [address]  Phone: [phone] [email or web address]    Alcoholics Anonymous   http://www.aa.org    Community Drug & Alcohol Support Resources  Alcoholics Anonymous  24/7 Phone Line: [phone]  [email or web address] For additional list of online meetings: http://aa-intergroup.org

## 2025-06-18 ENCOUNTER — APPOINTMENT (OUTPATIENT)
Dept: ULTRASOUND IMAGING | Facility: CLINIC | Age: 60
End: 2025-06-18
Attending: INTERNAL MEDICINE
Payer: COMMERCIAL

## 2025-06-18 LAB
% LINING CELLS, BODY FLUID: 2 %
ALBUMIN BODY FLUID SOURCE: NORMAL
ALBUMIN FLD-MCNC: 0.6 G/DL
ALBUMIN SERPL BCG-MCNC: 3.3 G/DL (ref 3.5–5.2)
ALP SERPL-CCNC: 589 U/L (ref 40–150)
ALT SERPL W P-5'-P-CCNC: 127 U/L (ref 0–70)
ANION GAP SERPL CALCULATED.3IONS-SCNC: 13 MMOL/L (ref 7–15)
APPEARANCE FLD: ABNORMAL
AST SERPL W P-5'-P-CCNC: 486 U/L (ref 0–45)
BILIRUB SERPL-MCNC: 6.7 MG/DL
BUN SERPL-MCNC: 31.4 MG/DL (ref 6–20)
CALCIUM SERPL-MCNC: 8.8 MG/DL (ref 8.8–10.4)
CHLORIDE SERPL-SCNC: 98 MMOL/L (ref 98–107)
COLOR FLD: ABNORMAL
CREAT SERPL-MCNC: 0.92 MG/DL (ref 0.67–1.17)
EGFRCR SERPLBLD CKD-EPI 2021: >90 ML/MIN/1.73M2
ERYTHROCYTE [DISTWIDTH] IN BLOOD BY AUTOMATED COUNT: 16.5 % (ref 10–15)
GLUCOSE SERPL-MCNC: 97 MG/DL (ref 70–99)
HCO3 SERPL-SCNC: 22 MMOL/L (ref 22–29)
HCT VFR BLD AUTO: 37.3 % (ref 40–53)
HGB BLD-MCNC: 11 G/DL (ref 13.3–17.7)
HGB BLD-MCNC: 11.1 G/DL (ref 13.3–17.7)
HGB BLD-MCNC: 12.3 G/DL (ref 13.3–17.7)
LYMPHOCYTES NFR FLD MANUAL: 33 %
MCH RBC QN AUTO: 31.7 PG (ref 26.5–33)
MCHC RBC AUTO-ENTMCNC: 33 G/DL (ref 31.5–36.5)
MCV RBC AUTO: 93 FL (ref 78–100)
MCV RBC AUTO: 94 FL (ref 78–100)
MCV RBC AUTO: 96 FL (ref 78–100)
MONOS+MACROS NFR FLD MANUAL: 12 %
NEUTROPHILS # FLD: 355.6 /UL (ref ?–250)
NEUTS BAND NFR FLD MANUAL: 53 %
PLATELET # BLD AUTO: 66 10E3/UL (ref 150–450)
POTASSIUM SERPL-SCNC: 4.6 MMOL/L (ref 3.4–5.3)
PROT FLD-MCNC: 1.4 G/DL
PROT SERPL-MCNC: 7.1 G/DL (ref 6.4–8.3)
PROTEIN BODY FLUID SOURCE: NORMAL
RBC # BLD AUTO: 3.88 10E6/UL (ref 4.4–5.9)
SODIUM SERPL-SCNC: 133 MMOL/L (ref 135–145)
SPECIMEN SOURCE FLD: ABNORMAL
WBC # BLD AUTO: 5 10E3/UL (ref 4–11)
WBC # FLD AUTO: 671 /UL

## 2025-06-18 PROCEDURE — 85018 HEMOGLOBIN: CPT | Performed by: INTERNAL MEDICINE

## 2025-06-18 PROCEDURE — 36415 COLL VENOUS BLD VENIPUNCTURE: CPT | Performed by: INTERNAL MEDICINE

## 2025-06-18 PROCEDURE — 250N000013 HC RX MED GY IP 250 OP 250 PS 637: Performed by: INTERNAL MEDICINE

## 2025-06-18 PROCEDURE — 99233 SBSQ HOSP IP/OBS HIGH 50: CPT | Performed by: INTERNAL MEDICINE

## 2025-06-18 PROCEDURE — 82042 OTHER SOURCE ALBUMIN QUAN EA: CPT | Performed by: INTERNAL MEDICINE

## 2025-06-18 PROCEDURE — 89050 BODY FLUID CELL COUNT: CPT | Performed by: INTERNAL MEDICINE

## 2025-06-18 PROCEDURE — 88112 CYTOPATH CELL ENHANCE TECH: CPT | Mod: 26 | Performed by: PATHOLOGY

## 2025-06-18 PROCEDURE — 120N000001 HC R&B MED SURG/OB

## 2025-06-18 PROCEDURE — 88305 TISSUE EXAM BY PATHOLOGIST: CPT | Mod: TC | Performed by: INTERNAL MEDICINE

## 2025-06-18 PROCEDURE — 87070 CULTURE OTHR SPECIMN AEROBIC: CPT | Performed by: INTERNAL MEDICINE

## 2025-06-18 PROCEDURE — 88305 TISSUE EXAM BY PATHOLOGIST: CPT | Mod: 26 | Performed by: PATHOLOGY

## 2025-06-18 PROCEDURE — 84157 ASSAY OF PROTEIN OTHER: CPT | Performed by: INTERNAL MEDICINE

## 2025-06-18 PROCEDURE — 86015 ACTIN ANTIBODY EACH: CPT | Performed by: PHYSICIAN ASSISTANT

## 2025-06-18 PROCEDURE — 36415 COLL VENOUS BLD VENIPUNCTURE: CPT | Performed by: PHYSICIAN ASSISTANT

## 2025-06-18 PROCEDURE — 250N000013 HC RX MED GY IP 250 OP 250 PS 637: Performed by: PHYSICIAN ASSISTANT

## 2025-06-18 PROCEDURE — 272N000706 US PARACENTESIS WITH ALBUMIN

## 2025-06-18 PROCEDURE — 82310 ASSAY OF CALCIUM: CPT | Performed by: INTERNAL MEDICINE

## 2025-06-18 PROCEDURE — 86381 MITOCHONDRIAL ANTIBODY EACH: CPT | Performed by: PHYSICIAN ASSISTANT

## 2025-06-18 PROCEDURE — 86038 ANTINUCLEAR ANTIBODIES: CPT | Performed by: PHYSICIAN ASSISTANT

## 2025-06-18 PROCEDURE — 250N000011 HC RX IP 250 OP 636: Mod: JZ | Performed by: INTERNAL MEDICINE

## 2025-06-18 PROCEDURE — 250N000009 HC RX 250: Performed by: STUDENT IN AN ORGANIZED HEALTH CARE EDUCATION/TRAINING PROGRAM

## 2025-06-18 PROCEDURE — 0W9G3ZZ DRAINAGE OF PERITONEAL CAVITY, PERCUTANEOUS APPROACH: ICD-10-PCS | Performed by: STUDENT IN AN ORGANIZED HEALTH CARE EDUCATION/TRAINING PROGRAM

## 2025-06-18 PROCEDURE — 250N000011 HC RX IP 250 OP 636: Performed by: INTERNAL MEDICINE

## 2025-06-18 RX ORDER — LIDOCAINE HYDROCHLORIDE 10 MG/ML
10 INJECTION, SOLUTION EPIDURAL; INFILTRATION; INTRACAUDAL; PERINEURAL ONCE
Status: COMPLETED | OUTPATIENT
Start: 2025-06-18 | End: 2025-06-18

## 2025-06-18 RX ORDER — CEFTRIAXONE 2 G/1
2 INJECTION, POWDER, FOR SOLUTION INTRAMUSCULAR; INTRAVENOUS EVERY 24 HOURS
Status: DISCONTINUED | OUTPATIENT
Start: 2025-06-18 | End: 2025-06-20 | Stop reason: HOSPADM

## 2025-06-18 RX ADMIN — PANTOPRAZOLE SODIUM 40 MG: 40 TABLET, DELAYED RELEASE ORAL at 09:31

## 2025-06-18 RX ADMIN — APIXABAN 5 MG: 5 TABLET, FILM COATED ORAL at 09:31

## 2025-06-18 RX ADMIN — CEFTRIAXONE SODIUM 2 G: 2 INJECTION, POWDER, FOR SOLUTION INTRAMUSCULAR; INTRAVENOUS at 13:00

## 2025-06-18 RX ADMIN — THIAMINE HCL TAB 100 MG 100 MG: 100 TAB at 09:31

## 2025-06-18 RX ADMIN — HYDROMORPHONE HYDROCHLORIDE 0.2 MG: 0.2 INJECTION, SOLUTION INTRAMUSCULAR; INTRAVENOUS; SUBCUTANEOUS at 19:24

## 2025-06-18 RX ADMIN — HYDROMORPHONE HYDROCHLORIDE 0.2 MG: 0.2 INJECTION, SOLUTION INTRAMUSCULAR; INTRAVENOUS; SUBCUTANEOUS at 11:48

## 2025-06-18 RX ADMIN — FOLIC ACID 1 MG: 1 TABLET ORAL at 09:31

## 2025-06-18 RX ADMIN — APIXABAN 2.5 MG: 2.5 TABLET, FILM COATED ORAL at 21:16

## 2025-06-18 RX ADMIN — FUROSEMIDE 20 MG: 20 TABLET ORAL at 09:31

## 2025-06-18 RX ADMIN — AMLODIPINE BESYLATE 5 MG: 5 TABLET ORAL at 09:31

## 2025-06-18 RX ADMIN — SPIRONOLACTONE 50 MG: 25 TABLET ORAL at 09:30

## 2025-06-18 RX ADMIN — Medication 1 TABLET: at 09:31

## 2025-06-18 RX ADMIN — LIDOCAINE HYDROCHLORIDE ANHYDROUS 10 ML: 10 INJECTION, SOLUTION INFILTRATION at 11:12

## 2025-06-18 ASSESSMENT — ACTIVITIES OF DAILY LIVING (ADL)
ADLS_ACUITY_SCORE: 24
ADLS_ACUITY_SCORE: 22
ADLS_ACUITY_SCORE: 24
ADLS_ACUITY_SCORE: 22
ADLS_ACUITY_SCORE: 22
ADLS_ACUITY_SCORE: 24
ADLS_ACUITY_SCORE: 22
ADLS_ACUITY_SCORE: 24
ADLS_ACUITY_SCORE: 22
ADLS_ACUITY_SCORE: 24
ADLS_ACUITY_SCORE: 22
ADLS_ACUITY_SCORE: 24

## 2025-06-18 NOTE — PLAN OF CARE
Goal Outcome Evaluation:       Summary:  Alcoholic cirrhosis, ascites, Portal vein thrombosis  DATE & TIME: 6/18/25 3510-5515  Cognitive Concerns/ Orientation : A&Ox4    BEHAVIOR & AGGRESSION TOOL COLOR: GREEN  CIWA SCORE: 0, 0, 0   ABNL VS/O2: VSS RA   MOBILITY: Independent   PAIN MANAGMENT: C/o right sided abdominal pain- Dilaudid 0.2 mg Iv given x1 for pain with relief  DIET: Regular - tolerating   BOWEL/BLADDER: Continent, ambulating to bathroom, abdomen distended- had paracentesis today removed 1650.    ABNL LAB/BG: , Albumin 3.3, , , Hemoglobin 11.0, +absolute neutrophils in para fluid- started on Iv Rocephin.   DRAIN/DEVICES: R PIV SL   TELEMETRY RHYTHM: NA  SKIN: Intact, jaundice  TESTS/PROCEDURES:Paracentesis today - right site CDI  D/C DAY/GOALS/PLACE: pending improvement   OTHER IMPORTANT INFO: GI/SW/Oncology following.  Eth <0.01, last drink 6/16.  Started on Eliquis and diuretics yesterday.  Trace edema of legs.  Denies SOB.  Denies nausea. Nose bleed this evening x1.

## 2025-06-18 NOTE — PROGRESS NOTES
[facility]    Medicine Progress Note - Hospitalist Service    Date of Admission:  6/16/2025    Assessment & Plan     Yimi Gibbs is a 59 year old male admitted on 6/16/2025. He presents with abdominal pain and jaundice     Extensive portal vein thrombosis  Alcoholic cirrhosis  Elevated LFTs secondary to alcoholic cirrhosis  Large infiltrative mass in the right hepatic lobe measuring 15.6 x 14 cm concerning for hepatocellular cancer  Notes feeling fatigued for several weeks, notes swelling in R upper abdomen. Wife has noted jaundice as well. No known hx liver disease but has hx Etoh use. Some nausea, no vomiting. No other abd pain other than RUQ. No f/c. In ED AFVSS. AST//126 (up from previous). Alk phos 582. TB 6.8 (1.3 3/2025). lipase 84. INR 1.17.   *US abdomen w/ cirrhosis, portal vein thrombosis, small/ mod ascites. GB wall thickening may be 2/2 ascites, no stones or sludge.   - GI consulted and are following  - MRI of the liver was done and it showed large infiltrative mass in the right hepatic lobe measuring up to 15.6 x 14 cm in maximum transverse dimensions.  This mass demonstrates heterogeneous enhancement with washout and restricted diffusion.  There are numerous satellite lesions throughout both lobes of the liver.  There is extensive tumor thrombus involving the intrahepatic portal veins and extending into the main portal vein.  Superior mesenteric vein and splenic vein.  The left and right hepatic veins appear patent.  Alpha-fetoprotein also returned elevated at 4114  .  Oncology and surgical oncology consultations recommended requested and recommendations are pending    Patient was started on Eliquis 5 mg p.o. twice daily due to portal vein thrombosis.  Which is currently on hold if patient needs biopsy of the liver mass    Ascites secondary to alcoholic cirrhosis;  Patient was started on Lasix 20 mg p.o. daily and spironolactone 50 mg oral daily to help with  ascites  On 6/18/2025 during interview patient still complains of significant abdominal bloating and discomfort.  Ultrasound-guided paracentesis ordered with fluid studies.  1.7 L of bloody fluid were removed and sent to lab.  Fluid studies are currently pending  Cytology also ordered and sent     Alcohol use disorder  Suspected alcohol withdrawal seizures  Hospitalized in 2/2025 w/ possible Etoh withdrawal seizures. Reports last Etoh yesterday, one drink. States recently not drinking regularly. Historically has drank more but is vague on this (previous notes indicate wife has noted excessive drinking, mostly beer). Etoh on presentation negative. Currently not taking keppra.   - CIWA  - prn diazepam  - vitamins     HTN  HLD  - resume amlodipine 5 mg daily     Thrombocytopenia  Plts 88, likely 2/2 liver disease. Previous 159 2/2025.   Platelet count slightly lower at 66K on 5/18/2025     Spasmodic dysphonia due to history of vocal cord paralysis  noted          Diet: Combination Diet Regular Diet Adult    DVT Prophylaxis: DOAC currently on hold as patient might need biopsy of the liver mass  Gomez Catheter: Not present  Lines: None     Cardiac Monitoring: None  Code Status: Full Code      Clinically Significant Risk Factors         # Hyponatremia: Lowest Na = 133 mmol/L in last 2 days, will monitor as appropriate   # Hypocalcemia: Lowest Ca = 8.6 mg/dL in last 2 days, will monitor and replace as appropriate     # Hypoalbuminemia: Lowest albumin = 3.3 g/dL at 6/18/2025  8:34 AM, will monitor as appropriate  # Coagulation Defect: INR = 1.24 (Ref range: 0.85 - 1.15) and/or PTT = N/A, will monitor for bleeding  # Thrombocytopenia: Lowest platelets = 66 in last 2 days, will monitor for bleeding   # Hypertension: Noted on problem list                # Financial/Environmental Concerns: none  # Asthma: noted on problem list        Social Drivers of Health    Social Connections: Unknown (6/10/2024)    Social Connection and  Isolation Panel [NHANES]     Frequency of Social Gatherings with Friends and Family: Once a week          Disposition Plan     Medically Ready for Discharge: Anticipated in 2-4 Days    Once oncology plan in place.  Most likely will discharge to home with his wife         Cony Vila MD  Hospitalist Service  [facility]  Securely message with [employer] (more info)  Text page via [employer] Paging/Directory   ______________________________________________________________________    Interval History   Chart reviewed.  Discussed with bedside RN    Patient is resting comfortably in bed.  Complains of bloated feeling with ascites.  Ultrasound-guided paracentesis ordered.  Also complains of mild intermittent right upper quadrant abdominal pain.  They are waiting for oncology consult.  No other acute issues    Physical Exam   Vital Signs: Temp: 98.9  F (37.2  C) Temp src: Oral BP: (!) 140/79 Pulse: 90   Resp: 18 SpO2: 95 % O2 Device: None (Room air)    Weight: 193 lbs 12.8 oz    General Appearance: Alert awake, resting comfortably in bed, not in acute distress  Respiratory: Clear to auscultation bilaterally, no rales rhonchi wheezing  Cardiovascular: Normal rate rhythm regular, no murmurs rubs or gallops  GI: Soft, abdomen moderately distended, tender in the right upper quadrant, no guarding rigidity or rebound noted  Skin: Warm and dry  Other: No lower extremity edema    Medical Decision Making       52 MINUTES SPENT BY ME on the date of service doing chart review, history, exam, documentation & further activities per the note.      Data     I have personally reviewed the following data over the past 24 hrs:    5.0  \   12.3 (L)   / 66 (L)     133 (L) 98 31.4 (H) /  97   4.6 22 0.92 \     ALT: 127 (H) AST: 486 (H) AP: 589 (H) TBILI: 6.7 (H)   ALB: 3.3 (L) TOT PROTEIN: 7.1 LIPASE: N/A       Imaging results reviewed over the past 24 hrs:   Recent Results (from the past 24 hours)   MR Liver wo & w Contrast     Narrative    EXAM: MR LIVER W/O and W CONTRAST  LOCATION: [facility]  DATE: 6/17/2025    INDICATION: Concern for HCC  COMPARISON: CT June 16, 2025  TECHNIQUE: Routine MRI liver protocol including T1 in/out phase, diffusion, multiplane T2, and dynamic T1 with IV contrast.    CONTRAST: 9 mL Gadavist    FINDINGS:     LIVER: Large infiltrative mass in the right hepatic lobe measuring up to 15.6 x 14.0 cm in maximum transverse dimensions (series 1200 image 33). The mass demonstrates heterogeneous enhancement with washout and restricted diffusion in. There are numerous   satellite lesions throughout both lobes of the liver. There is extensive tumor thrombus involving the intrahepatic portal veins and extending into the main portal vein, superior mesenteric vein, and splenic vein.    The left and right hepatic veins appear patent. The middle hepatic vein is thrombosed and there is likely tumor thrombus within the central middle hepatic vein.    ADDITIONAL FINDINGS: The gallbladder is unremarkable. No biliary ductal dilatation. Mild splenomegaly. The pancreas is unremarkable. The bilateral adrenal glands and kidneys are unremarkable. No lymphadenopathy. Small volume ascites and diffuse   mesenteric edema. The visualized bowel is unremarkable. No abdominal aortic aneurysm. There are upper abdominal varices. No suspicious osseous lesions.      Impression    IMPRESSION:  1.  Infiltrative mass centered in the right hepatic lobe measuring up to 15.6 cm, likely infiltrative hepatocellular carcinoma, with numerous satellite lesions throughout both lobes of the liver. Extensive tumor thrombus throughout the intrahepatic   portal veins, and extending into the main portal vein, superior mesenteric vein, and splenic vein.    2.  The middle hepatic vein is thrombosed with questionable tumor thrombus in the central middle hepatic vein.    3.  Sequela of portal hypertension including mild splenomegaly, small  volume ascites, and upper abdominal varices.       US Paracentesis with Albumin    Narrative    EXAM:  1. PARACENTESIS  2. ULTRASOUND GUIDANCE  LOCATION: [facility]  DATE: 6/18/2025    INDICATION: Ascites.    PROCEDURE: Informed consent obtained. Time out performed. The abdomen was prepped and draped in a sterile fashion. 10 mL of 1% lidocaine was infused into local soft tissues. A 5 [place] catheter system was introduced into the abdominal ascites under   ultrasound guidance.    1.7 liters of bloody fluid were removed and sent to lab if requested.    Patient tolerated procedure well.    Ultrasound imaging was obtained and placed in the patient's permanent medical record.      Impression    IMPRESSION:  1.  Status post ultrasound-guided paracentesis.    Reference CPT Code: 18905

## 2025-06-18 NOTE — PLAN OF CARE
Goal Outcome Evaluation:    Summary:  Alcoholic cirrhosis, ascites, Portal vein thrombosis  DATE & TIME: 6/17-18/25 [id number]   Cognitive Concerns/ Orientation : A&Ox4    BEHAVIOR & AGGRESSION TOOL COLOR: GREEN  CIWA SCORE: 0,0   ABNL VS/O2: VSS RA   MOBILITY: Independent   PAIN MANAGMENT: Denies pain  DIET: Regular   BOWEL/BLADDER: Continent, ambulating to bathroom, abdomen distended   ABNL LAB/BG: No new lab results  DRAIN/DEVICES: L hand PIV SL   TELEMETRY RHYTHM: NA  SKIN: Intact, jaundice  TESTS/PROCEDURES:CT abdomen completed 6/17, MRI liver completed   D/C DAY/GOALS/PLACE: pending improvement   OTHER IMPORTANT INFO: GI/SW following.  Eth <0.01, last drink 6/16.  Started on Eliquis and diuretics.  Trace edema of legs. Denies SOB.  Denies nausea.

## 2025-06-18 NOTE — PROGRESS NOTES
[facility]  Gastroenterology Progress Note     Yimi Gibbs MRN# [id number]   YOB: 1965 Age: 59 year old          Assessment and Plan:     Yimi Gibbs is a 59 year old male admitted on 6/16/2025. He presents with abdominal pain and jaundice      Portal vein thrombosis  Alcoholic cirrhosis of liver with ascites (H)  Liver masses- consistent for HCC  Notes feeling fatigued for several weeks, notes swelling in R upper abdomen   AST//127 (up from previous). Alk phos 589. TBili 6.8>6.7 (1.3 3/2025). lipase 84. INR 1.24. MELD 17  CT A/P: 1.  Diffusely abnormal enhancement pattern throughout the liver, appearance highly concerning for infiltrative hepatocellular carcinoma. 2.  Extensive tumor thrombus in the intrahepatic portal veins, extending into the extrahepatic portal vein, portosplenic confluence, and superior mesenteric vein.  3.  Small to moderate volume ascites. Mild splenomegaly.  6/17 MRI of liver noted Infiltrative mass centered in the right hepatic lobe measuring up to 15.6 cm, likely infiltrative hepatocellular carcinoma, with numerous satellite lesions throughout both lobes of the liver. Extensive tumor thrombus throughout the intrahepatic portal veins, and extending into the main portal vein, superior mesenteric vein, and splenic vein.  AFP 4114  Findings consistent with hepatocellular carcinoma  Has had hepatitis workup in 06/2024 with negative Hep C, Hep B, alpha one anti trypsin, hemochromatosis. NO autoimmune workup noted.     - Hematology/oncology consulted as well was surgical oncology  - started on Eliquis  - continue diuretics (furosemide 20 mg and spironolactone 50 mg) -plan to titrate based on tolerability  - regular diet  - daily CMP, INR, CBC  - GI will follow                Interval History:     doing well; no cp, sob, n/v/d, or abd pain.              Review of Systems:     C: NEGATIVE for fever, chills, change in weight  E/M:  NEGATIVE for ear, mouth and throat problems  R: NEGATIVE for significant cough or SOB  CV: NEGATIVE for chest pain, palpitations or peripheral edema             Medications:   I have reviewed this patient's current medications  Current Facility-Administered Medications   Medication Dose Route Frequency Provider Last Rate Last Admin    amLODIPine (NORVASC) tablet 5 mg  5 mg Oral Daily Trey Bailey MD   5 mg at 06/18/25 0931    apixaban ANTICOAGULANT (ELIQUIS) tablet 5 mg  5 mg Oral BID Cony Vila MD   5 mg at 06/18/25 0931    folic acid (FOLVITE) tablet 1 mg  1 mg Oral Daily Trey Bailey MD   1 mg at 06/18/25 0931    furosemide (LASIX) tablet 20 mg  20 mg Oral Daily Latasha Moya PA-C   20 mg at 06/18/25 0931    multivitamin w/minerals (THERA-VIT-M) tablet 1 tablet  1 tablet Oral Daily Trey Bailey MD   1 tablet at 06/18/25 0931    pantoprazole (PROTONIX) EC tablet 40 mg  40 mg Oral QAM AC Cony Vila MD   40 mg at 06/18/25 0931    sodium chloride (PF) 0.9% PF flush 3 mL  3 mL Intracatheter Q8H SHARAD Trey Bailey MD   3 mL at 06/17/25 1422    spironolactone (ALDACTONE) tablet 50 mg  50 mg Oral Daily Latasha Moya PA-C   50 mg at 06/18/25 0930    thiamine (B-1) tablet 100 mg  100 mg Oral Daily Trey Bailey MD   100 mg at 06/18/25 0931                  Physical Exam:   Vitals were reviewed  Vital Signs with Ranges  Temp:  [98.8  F (37.1  C)-99.3  F (37.4  C)] 98.9  F (37.2  C)  Pulse:  [80-85] 80  Resp:  [16-18] 18  BP: (114-124)/(79-84) 120/83  SpO2:  [95 %-97 %] 95 %  I/O last 3 completed shifts:  In: 150 [P.O.:150]  Out: -   Constitutional: Alert, oriented to person, place, date, situation.  Cooperative, lying in bed in NAD.   Respiratory:  Lungs CTAB.  No crackles, wheezes, or rhonchi, no labored breathing.  Cardiovascular:  Heart RRR, no MRG, no edema.  GI:  Abdomen soft, NT/ND and with normoactive BS  Skin/Integumen:  Warm, dry,  non-diaphoretic. Jaundice  MSK: CMS x4 intact.             Data:   I reviewed the patient's new clinical lab test results.   Recent Labs   Lab Test 06/18/25  0834 06/17/25  1040 06/16/25  1818 02/24/25  0815   WBC 5.0  --  6.2 5.0   HGB 12.3*  --  12.1* 15.1   MCV 96  --  97 91   PLT 66*  --  88* 159   INR  --  1.24* 1.17*  --      Recent Labs   Lab Test 06/18/25  0834 06/17/25  1040 06/16/25  1818   POTASSIUM 4.6 4.5 5.0   CHLORIDE 98 100 100   CO2 22 22 24   BUN 31.4* 30.8* 30.3*   ANIONGAP 13 13 12     Recent Labs   Lab Test 06/18/25  0834 06/17/25  1040 06/16/25  1818 06/11/24  0838 10/12/20  1113   ALBUMIN 3.3* 3.4* 3.8   < > 4.7   BILITOTAL 6.7* 7.3* 6.8*   < > 0.9   BILIDIRECT  --   --   --   --  0.20   * 120* 126*   < > 138*   * 436* 454*   < > 104*   LIPASE  --   --  84*  --   --     < > = values in this interval not displayed.       I reviewed the patient's new imaging results.    All laboratory data reviewed  All imaging studies reviewed by me.    Latasha Moya PA-C,  6/18/2025  [facility]  Office : [phone]  Cell: [phone] (Dr. Knight)  Cell: [phone] (Latasha Moya PA-C)

## 2025-06-19 VITALS
RESPIRATION RATE: 18 BRPM | OXYGEN SATURATION: 97 % | DIASTOLIC BLOOD PRESSURE: 79 MMHG | TEMPERATURE: 99.8 F | SYSTOLIC BLOOD PRESSURE: 120 MMHG | BODY MASS INDEX: 24.68 KG/M2 | HEART RATE: 94 BPM | WEIGHT: 192.3 LBS | HEIGHT: 74 IN

## 2025-06-19 LAB
ANA PAT SER IF-IMP: ABNORMAL
ANA SER QL IF: ABNORMAL
ANA TITR SER IF: ABNORMAL {TITER}
ANION GAP SERPL CALCULATED.3IONS-SCNC: 11 MMOL/L (ref 7–15)
APTT PPP: 31 SECONDS (ref 22–38)
BACTERIA FLD CULT: NORMAL
BUN SERPL-MCNC: 29.8 MG/DL (ref 6–20)
CALCIUM SERPL-MCNC: 8.3 MG/DL (ref 8.8–10.4)
CHLORIDE SERPL-SCNC: 97 MMOL/L (ref 98–107)
CREAT SERPL-MCNC: 0.88 MG/DL (ref 0.67–1.17)
EGFRCR SERPLBLD CKD-EPI 2021: >90 ML/MIN/1.73M2
ERYTHROCYTE [DISTWIDTH] IN BLOOD BY AUTOMATED COUNT: 16.6 % (ref 10–15)
FIBRINOGEN PPP-MCNC: 342 MG/DL (ref 170–510)
GLUCOSE SERPL-MCNC: 93 MG/DL (ref 70–99)
GRAM STAIN RESULT: NORMAL
GRAM STAIN RESULT: NORMAL
HCO3 SERPL-SCNC: 24 MMOL/L (ref 22–29)
HCT VFR BLD AUTO: 32.6 % (ref 40–53)
HGB BLD-MCNC: 11.4 G/DL (ref 13.3–17.7)
INR PPP: 1.52 (ref 0.85–1.15)
MCH RBC QN AUTO: 32.5 PG (ref 26.5–33)
MCHC RBC AUTO-ENTMCNC: 35 G/DL (ref 31.5–36.5)
MCV RBC AUTO: 93 FL (ref 78–100)
PLATELET # BLD AUTO: 58 10E3/UL (ref 150–450)
POTASSIUM SERPL-SCNC: 4.4 MMOL/L (ref 3.4–5.3)
PROTHROMBIN TIME: 17.9 SECONDS (ref 11.8–14.8)
RBC # BLD AUTO: 3.51 10E6/UL (ref 4.4–5.9)
SODIUM SERPL-SCNC: 132 MMOL/L (ref 135–145)
WBC # BLD AUTO: 4.9 10E3/UL (ref 4–11)

## 2025-06-19 PROCEDURE — 80048 BASIC METABOLIC PNL TOTAL CA: CPT | Performed by: INTERNAL MEDICINE

## 2025-06-19 PROCEDURE — 36415 COLL VENOUS BLD VENIPUNCTURE: CPT | Performed by: INTERNAL MEDICINE

## 2025-06-19 PROCEDURE — 250N000011 HC RX IP 250 OP 636: Performed by: INTERNAL MEDICINE

## 2025-06-19 PROCEDURE — 85384 FIBRINOGEN ACTIVITY: CPT | Performed by: INTERNAL MEDICINE

## 2025-06-19 PROCEDURE — 250N000013 HC RX MED GY IP 250 OP 250 PS 637: Performed by: INTERNAL MEDICINE

## 2025-06-19 PROCEDURE — 250N000013 HC RX MED GY IP 250 OP 250 PS 637: Performed by: PHYSICIAN ASSISTANT

## 2025-06-19 PROCEDURE — 85018 HEMOGLOBIN: CPT | Performed by: INTERNAL MEDICINE

## 2025-06-19 PROCEDURE — 85610 PROTHROMBIN TIME: CPT | Performed by: INTERNAL MEDICINE

## 2025-06-19 PROCEDURE — 99232 SBSQ HOSP IP/OBS MODERATE 35: CPT | Performed by: INTERNAL MEDICINE

## 2025-06-19 PROCEDURE — 250N000011 HC RX IP 250 OP 636: Mod: JZ | Performed by: INTERNAL MEDICINE

## 2025-06-19 PROCEDURE — 120N000001 HC R&B MED SURG/OB

## 2025-06-19 PROCEDURE — 85730 THROMBOPLASTIN TIME PARTIAL: CPT | Performed by: INTERNAL MEDICINE

## 2025-06-19 RX ADMIN — FUROSEMIDE 20 MG: 20 TABLET ORAL at 09:47

## 2025-06-19 RX ADMIN — AMLODIPINE BESYLATE 5 MG: 5 TABLET ORAL at 09:47

## 2025-06-19 RX ADMIN — OXYCODONE HYDROCHLORIDE 2.5 MG: 5 TABLET ORAL at 21:20

## 2025-06-19 RX ADMIN — SPIRONOLACTONE 50 MG: 25 TABLET ORAL at 09:47

## 2025-06-19 RX ADMIN — OXYCODONE HYDROCHLORIDE 2.5 MG: 5 TABLET ORAL at 16:22

## 2025-06-19 RX ADMIN — CEFTRIAXONE SODIUM 2 G: 2 INJECTION, POWDER, FOR SOLUTION INTRAMUSCULAR; INTRAVENOUS at 12:31

## 2025-06-19 RX ADMIN — Medication 1 TABLET: at 09:47

## 2025-06-19 RX ADMIN — THIAMINE HCL TAB 100 MG 100 MG: 100 TAB at 09:47

## 2025-06-19 RX ADMIN — HYDROMORPHONE HYDROCHLORIDE 0.2 MG: 0.2 INJECTION, SOLUTION INTRAMUSCULAR; INTRAVENOUS; SUBCUTANEOUS at 01:46

## 2025-06-19 RX ADMIN — PANTOPRAZOLE SODIUM 40 MG: 40 TABLET, DELAYED RELEASE ORAL at 06:34

## 2025-06-19 RX ADMIN — FOLIC ACID 1 MG: 1 TABLET ORAL at 09:47

## 2025-06-19 ASSESSMENT — ACTIVITIES OF DAILY LIVING (ADL)
ADLS_ACUITY_SCORE: 28
ADLS_ACUITY_SCORE: 24
ADLS_ACUITY_SCORE: 28
ADLS_ACUITY_SCORE: 24
ADLS_ACUITY_SCORE: 28
ADLS_ACUITY_SCORE: 24
ADLS_ACUITY_SCORE: 24
ADLS_ACUITY_SCORE: 28
ADLS_ACUITY_SCORE: 24
ADLS_ACUITY_SCORE: 28
ADLS_ACUITY_SCORE: 28
ADLS_ACUITY_SCORE: 24
ADLS_ACUITY_SCORE: 28
ADLS_ACUITY_SCORE: 28
ADLS_ACUITY_SCORE: 24
ADLS_ACUITY_SCORE: 28
ADLS_ACUITY_SCORE: 24

## 2025-06-19 NOTE — PROGRESS NOTES
[facility]    Medicine Progress Note - Hospitalist Service    Date of Admission:  6/16/2025    Assessment & Plan     Yimi Gibbs is a 59 year old male admitted on 6/16/2025. He presents with abdominal pain and jaundice     Extensive portal vein thrombosis  Alcoholic cirrhosis  Elevated LFTs secondary to alcoholic cirrhosis  Large infiltrative mass in the right hepatic lobe measuring 15.6 x 14 cm concerning for hepatocellular cancer  Notes feeling fatigued for several weeks, notes swelling in R upper abdomen. Wife has noted jaundice as well. No known hx liver disease but has hx Etoh use. Some nausea, no vomiting. No other abd pain other than RUQ. No f/c. In ED AFVSS. AST//126 (up from previous). Alk phos 582. TB 6.8 (1.3 3/2025). lipase 84. INR 1.17.   *US abdomen w/ cirrhosis, portal vein thrombosis, small/ mod ascites. GB wall thickening may be 2/2 ascites, no stones or sludge.   - GI consulted and are following  - MRI of the liver was done and it showed large infiltrative mass in the right hepatic lobe measuring up to 15.6 x 14 cm in maximum transverse dimensions.  This mass demonstrates heterogeneous enhancement with washout and restricted diffusion.  There are numerous satellite lesions throughout both lobes of the liver.  There is extensive tumor thrombus involving the intrahepatic portal veins and extending into the main portal vein.  Superior mesenteric vein and splenic vein.  The left and right hepatic veins appear patent.  Alpha-fetoprotein also returned elevated at 4114  .  Oncology and surgical oncology consultations requested   Out pt follow up recommended with Oncology  Patient was started on Eliquis 2.5  mg p.o. twice daily due to portal vein thrombosis.  Which is currently on hold due to nose bleeding   Recheck CBC in am and retrial low dose     Ascites secondary to alcoholic cirrhosis;  Possible SBP   Patient was started on Lasix 20 mg p.o. daily and  spironolactone 50 mg oral daily to help with ascites  On 6/18/2025 during interview patient still complains of significant abdominal bloating and discomfort.  Ultrasound-guided paracentesis ordered with fluid studies.  1.7 L of bloody fluid were removed and sent to lab.  Fluid studies done showed  consistent with SBP   Started on IV ceftriaxone for SBP   Cytology also ordered and sent     Alcohol use disorder  Suspected alcohol withdrawal seizures  Hospitalized in 2/2025 w/ possible Etoh withdrawal seizures. Reports last Etoh yesterday, one drink. States recently not drinking regularly. Historically has drank more but is vague on this (previous notes indicate wife has noted excessive drinking, mostly beer). Etoh on presentation negative. Currently not taking keppra.   - CIWA d/dillan as not scoring high  Pt refused chem dep consult      HTN  HLD  - resume amlodipine 5 mg daily     Thrombocytopenia  Plts 88, likely 2/2 liver disease. Previous 159 2/2025.   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  noted          Diet: Combination Diet Regular Diet Adult    DVT Prophylaxis: DOAC currently on hold as patient might need biopsy of the liver mass  Gomez Catheter: Not present  Lines: None     Cardiac Monitoring: None  Code Status: Full Code      Clinically Significant Risk Factors         # Hyponatremia: Lowest Na = 132 mmol/L in last 2 days, will monitor as appropriate  # Hypochloremia: Lowest Cl = 97 mmol/L in last 2 days, will monitor as appropriate  # Hypocalcemia: Lowest Ca = 8.3 mg/dL in last 2 days, will monitor and replace as appropriate     # Hypoalbuminemia: Lowest albumin = 3.3 g/dL at 6/18/2025  8:34 AM, will monitor as appropriate    # Coagulation Defect: INR = 1.52 (Ref range: 0.85 - 1.15) and/or PTT = 31 Seconds (Ref range: 22 - 38 Seconds), will monitor for bleeding  # Thrombocytopenia: Lowest platelets = 58 in last 2 days,  will monitor for bleeding   # Hypertension: Noted on problem list                # Financial/Environmental Concerns: none  # Asthma: noted on problem list        Social Drivers of Health    Social Connections: Unknown (6/10/2024)    Social Connection and Isolation Panel [NHANES]     Frequency of Social Gatherings with Friends and Family: Once a week          Disposition Plan     Medically Ready for Discharge: Anticipated in 2-4 Days    Once no further nose bleeding on eliquis          Cony Vila MD  Hospitalist Service  [facility]  Securely message with [employer] (more info)  Text page via [employer] Paging/Directory   ______________________________________________________________________    Interval History   Chart reviewed.  Discussed with bedside RN    Patient is resting comfortably in bed.  Complains of nose bleeding so eliquis held. Pain well controlled,No other acute issues     Physical Exam   Vital Signs: Temp: 98.9  F (37.2  C) Temp src: Oral BP: 112/76 Pulse: 78   Resp: 18 SpO2: 98 % O2 Device: None (Room air)    Weight: 192 lbs 4.8 oz    General Appearance: Alert awake, resting comfortably in bed, not in acute distress  Respiratory: Clear to auscultation bilaterally, no rales rhonchi wheezing  Cardiovascular: Normal rate rhythm regular, no murmurs rubs or gallops  GI: Soft, abdomen mildly  distended, tender in the right upper quadrant, no guarding rigidity or rebound noted  Skin: Warm and dry  Other: No lower extremity edema    Medical Decision Making       52 MINUTES SPENT BY ME on the date of service doing chart review, history, exam, documentation & further activities per the note.      Data     I have personally reviewed the following data over the past 24 hrs:    4.9  \   11.4 (L)   / 58 (L)     132 (L) 97 (L) 29.8 (H) /  93   4.4 24 0.88 \     INR:  1.52 (H) PTT:  31   D-dimer:  N/A Fibrinogen:  342       Imaging results reviewed over the past 24 hrs:   No results found for this  or any previous visit (from the past 24 hours).

## 2025-06-19 NOTE — PROGRESS NOTES
Hematology/Oncology Follow-up Note  [facility]    Date of Admission:  6/16/2025   Reason for Consult:   Primary Oncologist:     ASSESSMENT : Yimi Gibbs is a 59 year old year old male who presented this hospitalization with abdominal pain and jaundice.     Abdominal Pain  Alcohol cirrhosis/jaundice  Liver Mass  Portal Vein Thrombosis  Alcohol Use Disorder    PLAN:  Urgent outpatient Oncology consult with Hepatobiliary Oncologist- referral placed  Outpatient consult with Hepatobiliary surgeon- referral placed  Nosebleed while on Eliquis 2.5 mg BID-DIC work up is negative.  Plan to hold anticoagulation today- recheck CBC tomorrow, can consider restarting anticoagulation at that time.  High risk of bleeding due to thrombocytopenia and coagulopathy from liver dysfunction - hold Eliquis for platelets </=50k  Outpatient paracentesis while waiting Oncology outpatient consult- orders placed  Hem/Onc will provide written letter as requested by patient for possible trip reimbursement  Hem/Onc will continue to follow      Abdominal Pain  - Pain management per hospitalist team.     Liver Mass  Ascites, Alcoholic Cirrhosis, Transaminitis  6/2024: hepatitis workup  with negative Hep C, Hep B, alpha one anti trypsin, hemochromatosis. NO autoimmune workup noted.   - 6/16/25: presented to ED with abdominal pain and jaundice. On admission Alk Phos 582,  and , bilirubin of 6.8, Albumin of 3.8, normal Cr, AFP 4114.0, Hgb 12.1, Plts 88k  - 6/16/25: US abdomen w/ cirrhosis, portal vein thrombosis, small/ mod ascites. GB wall thickening may be 2/2 ascites, no stones or sludge.   -  CT of the abdomen and pelvis with diffusely abnormal enhancement pattern throughout the liver appearance highly concerning for  infiltrative hepatocellular carcinoma with extensive tumor thrombus in the intrahepatic portal veins extending to extrahepatic portal veins post splenic thrombus.  MRI of the liver showed infiltrative  mass centered in the right hepatic lobe measuring up to 15.6 cm likely infiltrated with numerous satellite lesions throughout both lobes of the liver extensive portal thrombus as discussed above.  Mild splenomegaly.  Small volume of ascites.    - MELD of 17, Child-Mendez stage B   - 6/18/25: paracentesis, 1.7 liters of bloody fluid were removed, pathology pending. Started on IV ceftriaxone for SBP   - No need for a biopsy with the AFP levels as consistent with HCC with imaging  - will need palliative systemic therapy, however, will have him set up with surgery for further evaluation  - Urgent outpatient Hem/Onc consult requested for U- Hepatobiliary Oncologist  - GI following while inpatient  - 6/19/2025: Hgb 11.4>11.1, Plts 58k      Portal Vein Thrombosis  - 6/16/25: US with portal vein thrombosis  - 6/17/25 started on Eliquis 5 mg BID,  slight drop in hemoglobin, Eliquis held. Patient noted nosebleeds while on 5 mg BID  - 6/18/25: Hemoglobin stabilized after paracentesis and and he was started back on Eliquis at reduced dose of 2.5 mg BID.  - 6/19/2025 : patient noted nose bleed this am, Holding Eliquis. DIC work up per medicine team, is negative. Fibrinogen is WNL, aPTT WNL, PT/INR stable, Plts 58k. Continue to hold Eliquis today, recheck CBC tomorrow consider restarting Eliquis 2.5 mg BID  - High risk of bleeding due to thrombocytopenia and coagulopathy from liver dysfunction - hold Eliquis for platelets </=50k    Alcohol Use Disorder  - medicine managing- CIWA discontinued  - patient refused chemical dependency consult      INTERVAL HISTORY:   Reports that he hasn't noticed any additional fluid accumulation in the abdomen.  Hoarseness is baseline, endorses paralyzed vocal cord, spouse is providing much of the HPI. Reports that he had a slight nose bleed this morning., not currently bleeding.     EXAM:  Subjective  As per interval history    Objective  Constitutional: Pleasant male in no acute distress.  Eyes:  No scleral icterus. No conjunctival erythema or discharge  Respiratory: speaking in full clear sentences without audible wheezes or hoarseness  Neuro: Cranial nerves II-XII intact  Skin: No visible lesions, bruising or rashes  Psych: appropriate mood and affect       Labs Reviewed: CBC, CMP, labs as above   Imaging Reviewed:         35 minutes spent on the date of the encounter doing review of outside records, review of test results, interpretation of tests,  implementing/ creating plan, coordinating care, and documentation     Danica Torres PA-C  Department of Hematology and Oncology  [facility]      Securely message with [employer]

## 2025-06-19 NOTE — PLAN OF CARE
DATE & TIME: 6/18/25 1871-4587    Cognitive Concerns/ Orientation : Pt A/Ox4   BEHAVIOR & AGGRESSION TOOL COLOR: Green  CIWA SCORE: 0/0/0   ABNL VS/O2: VSS on RA  MOBILITY: Independent  PAIN MANAGMENT: Complaints of right side abdominal pain, managed with IV Dilaudid  DIET: Regular  BOWEL/BLADDER: Continent  ABNL LAB/BG: Na 133/Alk phos 589///Hgb 11.1  DRAIN/DEVICES: IV SL  SKIN: Jaundice. Paracentesis site right abdomen, dressing CDI  D/C DATE: Discharge pending progress  OTHER IMPORTANT INFO: Pt started on Eliquis. No complaints of SOB or nausea. Edema +1 to bilateral lower extremities.

## 2025-06-19 NOTE — PROGRESS NOTES
CHART CHECK (No charge)     **Patient not physically seen. Recent lab results, imaging results, progress notes and vital signs reviewed during chart review.     Yimi Gibbs is a 59 year old male admitted on 6/16/2025. He presents with abdominal pain and jaundice      Portal vein thrombosis  Alcoholic cirrhosis of liver with ascites (H)  Liver masses- consistent for HCC  Notes feeling fatigued for several weeks, notes swelling in R upper abdomen   AST//127 (up from previous). Alk phos 589. TBili 6.8>6.7 (1.3 3/2025). lipase 84. INR 1.24. MELD 17  CT A/P: 1.  Diffusely abnormal enhancement pattern throughout the liver, appearance highly concerning for infiltrative hepatocellular carcinoma. 2.  Extensive tumor thrombus in the intrahepatic portal veins, extending into the extrahepatic portal vein, portosplenic confluence, and superior mesenteric vein.  3.  Small to moderate volume ascites. Mild splenomegaly.  6/17 MRI of liver noted Infiltrative mass centered in the right hepatic lobe measuring up to 15.6 cm, likely infiltrative hepatocellular carcinoma, with numerous satellite lesions throughout both lobes of the liver. Extensive tumor thrombus throughout the intrahepatic portal veins, and extending into the main portal vein, superior mesenteric vein, and splenic vein.  AFP 4114  Findings consistent with hepatocellular carcinoma  Has had hepatitis workup in 06/2024 with negative Hep C, Hep B, alpha one anti trypsin, hemochromatosis.      - Hematology/oncology consulted as well was surgical oncology  - started on Eliquis  - continue diuretics (furosemide 20 mg and spironolactone 50 mg) -plan to titrate based on tolerability  - regular diet  - daily CMP, INR, CBC  - Autoimmune hepatitis labs pending  - GI will follow    Ascites secondary to alcoholic cirrhosis;  Possible SBP   *Patient was started on Lasix 20 mg p.o. daily and spironolactone 50 mg oral daily to help with ascites  *Patient still complains  of significant abdominal bloating and discomfort.  *Ultrasound-guided paracentesis ordered with fluid studies.  *1.7 L of bloody fluid were removed and sent to lab.  Fluid studies done showed  consistent with SBP   --Started on IV ceftriaxone for SBP   --Cytology pending     Ervin Benites PA-C,  6/19/2025  [facility]  Office : [phone]  Cell: [phone] (Dr. Knight)

## 2025-06-19 NOTE — PLAN OF CARE
Goal Outcome Evaluation:       Summary:  Alcoholic cirrhosis, ascites, Portal vein thrombosis    DATE & TIME: 6/19/25 2218-3574    Cognitive Concerns/ Orientation : Pt A/Ox4   BEHAVIOR & AGGRESSION TOOL COLOR: Green  CIWA SCORE: discontinued    ABNL VS/O2: VSS on RA  MOBILITY: Independent- ambulating in room   PAIN MANAGMENT: Abdomen pain minimal, declines medications, Oxy available if pt needs pain meds   DIET: Regular- appetite fair   BOWEL/BLADDER: Continent- had BM per pt   ABNL LAB/BG: , Hemoglobin 11.4, Platelets 58  DRAIN/DEVICES: PIV SL- intermittent antibiotics   SKIN: Jaundice. Paracentesis site right abdomen, dressing CDI  D/C DATE: Discharge pending progress possible tomorrow   OTHER IMPORTANT INFO: Pt started on Eliquis- pt had 2 episodes of bloody nose- on hold per MD till tomorrow am. No complaints of SOB or nausea. Edema +1 to bilateral lower extremities.

## 2025-06-20 VITALS
WEIGHT: 192.02 LBS | BODY MASS INDEX: 24.64 KG/M2 | OXYGEN SATURATION: 96 % | SYSTOLIC BLOOD PRESSURE: 119 MMHG | TEMPERATURE: 98.6 F | HEART RATE: 86 BPM | DIASTOLIC BLOOD PRESSURE: 77 MMHG | HEIGHT: 74 IN | RESPIRATION RATE: 18 BRPM

## 2025-06-20 LAB
ERYTHROCYTE [DISTWIDTH] IN BLOOD BY AUTOMATED COUNT: 16.9 % (ref 10–15)
HCT VFR BLD AUTO: 31.9 % (ref 40–53)
HGB BLD-MCNC: 11.3 G/DL (ref 13.3–17.7)
MCH RBC QN AUTO: 32.4 PG (ref 26.5–33)
MCHC RBC AUTO-ENTMCNC: 35.4 G/DL (ref 31.5–36.5)
MCV RBC AUTO: 91 FL (ref 78–100)
MITOCHONDRIA M2 IGG SER-ACNC: 1.2 U/ML
PATH REPORT.COMMENTS IMP SPEC: NORMAL
PATH REPORT.FINAL DX SPEC: NORMAL
PATH REPORT.GROSS SPEC: NORMAL
PATH REPORT.MICROSCOPIC SPEC OTHER STN: NORMAL
PATH REPORT.RELEVANT HX SPEC: NORMAL
PLATELET # BLD AUTO: 123 10E3/UL (ref 150–450)
RBC # BLD AUTO: 3.49 10E6/UL (ref 4.4–5.9)
SMA IGG SER-ACNC: 7 UNITS
WBC # BLD AUTO: 5.5 10E3/UL (ref 4–11)

## 2025-06-20 PROCEDURE — 99231 SBSQ HOSP IP/OBS SF/LOW 25: CPT

## 2025-06-20 PROCEDURE — 250N000013 HC RX MED GY IP 250 OP 250 PS 637: Performed by: INTERNAL MEDICINE

## 2025-06-20 PROCEDURE — 99239 HOSP IP/OBS DSCHRG MGMT >30: CPT | Performed by: INTERNAL MEDICINE

## 2025-06-20 PROCEDURE — 85014 HEMATOCRIT: CPT | Performed by: INTERNAL MEDICINE

## 2025-06-20 PROCEDURE — 250N000013 HC RX MED GY IP 250 OP 250 PS 637

## 2025-06-20 PROCEDURE — 250N000011 HC RX IP 250 OP 636: Performed by: INTERNAL MEDICINE

## 2025-06-20 PROCEDURE — 36415 COLL VENOUS BLD VENIPUNCTURE: CPT | Performed by: INTERNAL MEDICINE

## 2025-06-20 PROCEDURE — 250N000013 HC RX MED GY IP 250 OP 250 PS 637: Performed by: PHYSICIAN ASSISTANT

## 2025-06-20 RX ORDER — CEFUROXIME AXETIL 500 MG/1
500 TABLET ORAL 2 TIMES DAILY
Qty: 24 TABLET | Refills: 0 | Status: SHIPPED | OUTPATIENT
Start: 2025-06-20 | End: 2025-07-02

## 2025-06-20 RX ORDER — SPIRONOLACTONE 50 MG/1
50 TABLET, FILM COATED ORAL DAILY
Qty: 90 TABLET | Refills: 0 | Status: SHIPPED | OUTPATIENT
Start: 2025-06-21

## 2025-06-20 RX ORDER — OXYCODONE HYDROCHLORIDE 5 MG/1
5 TABLET ORAL EVERY 4 HOURS PRN
Qty: 10 TABLET | Refills: 0 | Status: SHIPPED | OUTPATIENT
Start: 2025-06-20 | End: 2025-06-24

## 2025-06-20 RX ORDER — FUROSEMIDE 20 MG/1
20 TABLET ORAL DAILY
Qty: 60 TABLET | Refills: 0 | Status: SHIPPED | OUTPATIENT
Start: 2025-06-21

## 2025-06-20 RX ORDER — OMEPRAZOLE 20 MG/1
20 CAPSULE, DELAYED RELEASE ORAL DAILY
Qty: 90 CAPSULE | Refills: 0 | Status: SHIPPED | OUTPATIENT
Start: 2025-06-20

## 2025-06-20 RX ADMIN — CEFTRIAXONE SODIUM 2 G: 2 INJECTION, POWDER, FOR SOLUTION INTRAMUSCULAR; INTRAVENOUS at 12:15

## 2025-06-20 RX ADMIN — Medication 1 TABLET: at 09:11

## 2025-06-20 RX ADMIN — AMLODIPINE BESYLATE 5 MG: 5 TABLET ORAL at 09:11

## 2025-06-20 RX ADMIN — FOLIC ACID 1 MG: 1 TABLET ORAL at 09:11

## 2025-06-20 RX ADMIN — APIXABAN 2.5 MG: 2.5 TABLET, FILM COATED ORAL at 09:11

## 2025-06-20 RX ADMIN — THIAMINE HCL TAB 100 MG 100 MG: 100 TAB at 09:11

## 2025-06-20 RX ADMIN — FUROSEMIDE 20 MG: 20 TABLET ORAL at 09:11

## 2025-06-20 RX ADMIN — PANTOPRAZOLE SODIUM 40 MG: 40 TABLET, DELAYED RELEASE ORAL at 06:44

## 2025-06-20 RX ADMIN — SPIRONOLACTONE 50 MG: 25 TABLET ORAL at 09:11

## 2025-06-20 ASSESSMENT — ACTIVITIES OF DAILY LIVING (ADL)
ADLS_ACUITY_SCORE: 28

## 2025-06-20 NOTE — PLAN OF CARE
Summary:  Alcoholic cirrhosis, ascites, Portal vein thrombosis    DATE & TIME: 6/19/25 1775-7470   Cognitive Concerns/ Orientation : Pt A/Ox4 , calm and cooperative  BEHAVIOR & AGGRESSION TOOL COLOR: Green  CIWA SCORE: discontinued    ABNL VS/O2: VSS on RA  MOBILITY: Independent- ambulating in room   PAIN MANAGMENT: Abdomen pain minimal, declines medications, PRN oxy 2.5 mg x2  DIET: Regular- appetite fair   BOWEL/BLADDER: Continent- had a few small BMs this evening  ABNL LAB/BG: , Hemoglobin 11.4, Platelets 58  DRAIN/DEVICES: PIV SL- intermittent antibiotics   SKIN: Jaundice. Paracentesis site right abdomen, dressing CDI  D/C DATE: Discharge pending progress possible tomorrow   OTHER IMPORTANT INFO: Pt started on Eliquis- pt had 2 episodes of bloody nose- on hold per MD till tomorrow am. No complaints of SOB or nausea. Edema +1 to bilateral lower extremities. Hem/onc and GI following

## 2025-06-20 NOTE — DISCHARGE SUMMARY
[facility]  Hospitalist Discharge Summary      Date of Admission:  6/16/2025  Date of Discharge:  6/20/2025  Discharging Provider: Cony Vila MD  Discharge Service: Hospitalist Service    Discharge Diagnoses   Please see below     Clinically Significant Risk Factors          Follow-ups Needed After Discharge   Follow-up Appointments       Follow Up      Follow up with Oncology in 1-2weeks for Hepatocellular cancer. Recheck CBC, CMP in 1week                Unresulted Labs Ordered in the Past 30 Days of this Admission       Date and Time Order Name Status Description    6/18/2025  1:09 PM F Actin EIA with reflex In process     6/18/2025 10:30 AM Ascites Fluid Aerobic Bacterial Culture Routine With Gram Stain Preliminary             Discharge Disposition   Discharged to home  Condition at discharge: Stable    Hospital Course     Yimi Gibbs is a 59 year old male admitted on 6/16/2025. He presents with abdominal pain and jaundice     Extensive portal vein thrombosis  Alcoholic cirrhosis  Elevated LFTs secondary to alcoholic cirrhosis  Large infiltrative mass in the right hepatic lobe measuring 15.6 x 14 cm concerning for hepatocellular cancer  Notes feeling fatigued for several weeks, notes swelling in R upper abdomen. Wife has noted jaundice as well. No known hx liver disease but has hx Etoh use. Some nausea, no vomiting. No other abd pain other than RUQ. No f/c. In ED AFVSS. AST//126 (up from previous). Alk phos 582. TB 6.8 (1.3 3/2025). lipase 84. INR 1.17.   *US abdomen w/ cirrhosis, portal vein thrombosis, small/ mod ascites. GB wall thickening may be 2/2 ascites, no stones or sludge.   - GI consulted and are following  - MRI of the liver was done and it showed large infiltrative mass in the right hepatic lobe measuring up to 15.6 x 14 cm in maximum transverse dimensions.  This mass demonstrates heterogeneous enhancement with washout and restricted diffusion.  There are  numerous satellite lesions throughout both lobes of the liver.  There is extensive tumor thrombus involving the intrahepatic portal veins and extending into the main portal vein.  Superior mesenteric vein and splenic vein.  The left and right hepatic veins appear patent.  Alpha-fetoprotein also returned elevated at 4114  .  Oncology and surgical oncology consultations requested   Out pt follow up recommended with Oncology  Patient was started on Eliquis 2.5  mg p.o. twice daily due to portal vein thrombosis.  Which is currently on hold due to nose bleeding     Restarted Eliquis at 2.5 mg p.o. twice daily on 6/20/2025 for retrial.  No further nosebleeds noted.  Patient wanting to discharge to home.  Oncology cleared him for discharge.  Outpatient follow-up with closely with oncology recommended     Ascites secondary to alcoholic cirrhosis;  Possible SBP   Patient was started on Lasix 20 mg p.o. daily and spironolactone 50 mg oral daily to help with ascites  On 6/18/2025 during interview patient still complains of significant abdominal bloating and discomfort.  Ultrasound-guided paracentesis ordered with fluid studies.  1.7 L of bloody fluid were removed and sent to lab.  Fluid studies done showed  consistent with SBP   Started on IV ceftriaxone for SBP will switch to oral Ceftin twice daily for total of 2 weeks course of antibiotics discharge  Cytology also ordered and sent, results are pending    Patient to have outpatient paracentesis as needed for the ascites.  Outpatient ultrasound-guided paracentesis results reviewed referral sent     Alcohol use disorder  Suspected alcohol withdrawal seizures  Hospitalized in 2/2025 w/ possible Etoh withdrawal seizures. Reports last Etoh yesterday, one drink. States recently not drinking regularly. Historically has drank more but is vague on this (previous notes indicate wife has noted excessive drinking, mostly beer). Etoh on presentation negative. Currently not taking  agnes.   - GELY d/dillan as not scoring high  Pt refused chem dep consult      HTN  HLD  - resumed amlodipine 5 mg daily     Thrombocytopenia  Plts 88, likely 2/2 liver disease. Previous 159 2/2025.   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  noted        Diet: Combination Diet Regular Diet Adult    DVT Prophylaxis: DOAC   Gomez Catheter: Not present  Lines: None     Cardiac Monitoring: None  Code Status: Full Code       Clinically Significant Risk Factors         # Hyponatremia: Lowest Na = 132 mmol/L in last 2 days, will monitor as appropriate  # Hypochloremia: Lowest Cl = 97 mmol/L in last 2 days, will monitor as appropriate  # Hypocalcemia: Lowest Ca = 8.3 mg/dL in last 2 days, will monitor and replace as appropriate     # Hypoalbuminemia: Lowest albumin = 3.3 g/dL at 6/18/2025  8:34 AM, will monitor as appropriate     # Coagulation Defect: INR = 1.52 (Ref range: 0.85 - 1.15) and/or PTT = 31 Seconds (Ref range: 22 - 38 Seconds), will monitor for bleeding  # Thrombocytopenia: Lowest platelets = 58 in last 2 days, will monitor for bleeding   # Hypertension: Noted on problem list                 # Financial/Environmental Concerns: none  # Asthma: noted on problem list         Social Drivers of Health     Consultations This Hospital Stay   GASTROENTEROLOGY IP CONSULT  CARE MANAGEMENT / SOCIAL WORK IP CONSULT  PHARMACY LIAISON FOR MEDICATION COVERAGE CONSULT  HEMATOLOGY & ONCOLOGY IP CONSULT  SURGICAL ONCOLOGY ADULT IP CONSULT    Code Status   Full Code    Time Spent on this Encounter   I, Cony Vila MD, personally saw the patient today and spent greater than 30 minutes discharging this patient.       Cony Vila MD  [place] [address] [facility]  [address] [place] [address]  Phone: [phone]  ______________________________________________________________________    Physical Exam   Vital Signs: Temp:  98.6  F (37  C) Temp src: Oral BP: 119/77 Pulse: 86   Resp: 18 SpO2: 96 % O2 Device: None (Room air)    Weight: 192 lbs .33 oz       General Appearance:  Alert awake, resting comfortably in bed, not in acute distress  Respiratory: Clear to auscultation bilaterally, no rales rhonchi wheezing  Cardiovascular: Normal rate rhythm regular, no murmurs rubs or gallops  GI: Soft, abdomen mildly  distended, tender in the right upper quadrant, no guarding rigidity or rebound noted  Skin: Warm and dry  Other:  No lower extremity edema    Primary Care Physician   Doug Subramanian    Discharge Orders      IR Referral     US Paracentesis without Albumin     Adult Oncology/Hematology  Referral      Adult Oncology/Hematology  Referral      Reason for your hospital stay    Liver mass with elevated AFP concern for Hepatocellular cancer with Portal vein thrombosis     Activity    Your activity upon discharge: activity as tolerated     Follow Up    Follow up with Oncology in 1-2weeks for Hepatocellular cancer. Recheck CBC, CMP in 1week     Full Code     Diet    Follow this diet upon discharge: Current Diet:Orders Placed This Encounter      Combination Diet Regular Diet Adult       Significant Results and Procedures   Most Recent 3 CBC's:  Recent Labs   Lab Test 06/20/25  0638 06/19/25  0609 06/18/25  1933 06/18/25  1456 06/18/25  0834   WBC 5.5 4.9  --   --  5.0   HGB 11.3* 11.4* 11.1*   < > 12.3*   MCV 91 93 94   < > 96   * 58*  --   --  66*    < > = values in this interval not displayed.     Most Recent 3 BMP's:  Recent Labs   Lab Test 06/19/25  0609 06/18/25  0834 06/17/25  1040   * 133* 135   POTASSIUM 4.4 4.6 4.5   CHLORIDE 97* 98 100   CO2 24 22 22   BUN 29.8* 31.4* 30.8*   CR 0.88 0.92 0.85   ANIONGAP 11 13 13   THANG 8.3* 8.8 8.6*   GLC 93 97 127*     Most Recent 2 LFT's:  Recent Labs   Lab Test 06/18/25  0834 06/17/25  1040   * 436*   * 120*   ALKPHOS 589* 530*   BILITOTAL 6.7* 7.3*      Most Recent 3 INR's:  Recent Labs   Lab Test 06/19/25  1113 06/17/25  1040 06/16/25  1818   INR 1.52* 1.24* 1.17*     Most Recent INR's and Anticoagulation Dosing History:  Anticoagulation Dose History          Latest Ref Rng & Units 6/16/2025 6/17/2025 6/19/2025   Recent Dosing and Labs   INR 0.85 - 1.15 1.17  1.24  1.52      Most Recent 3 Creatinines:  Recent Labs   Lab Test 06/19/25  0609 06/18/25  0834 06/17/25  1040   CR 0.88 0.92 0.85     Most Recent 3 Hemoglobins:  Recent Labs   Lab Test 06/20/25  0638 06/19/25  0609 06/18/25  1933   HGB 11.3* 11.4* 11.1*     Most Recent 3 Troponin's:No lab results found.  Most Recent 3 BNP's:No lab results found.  Most Recent D-dimer:  Recent Labs   Lab Test 02/23/25  0209   DD <0.27     Most Recent Cholesterol Panel:  Recent Labs   Lab Test 06/11/24  0000   CHOL 133   LDL 85   HDL 38*   TRIG 51     7-Day Micro Results       Collected Updated Procedure Result Status      06/18/2025 1118 06/18/2025 1236 Cell count with differential fluid [[id number]]    (Abnormal)   Ascites Fluid from Paracentesis    Final result Component Value   No component results            06/18/2025 1118 06/18/2025 1207 Albumin fluid [[id number]]    Ascites Fluid from Paracentesis    Final result Component Value Units   Albumin Fluid Source Abdomen    Albumin fluid 0.6 g/dL            06/18/2025 1118 06/18/2025 1207 Protein fluid [[id number]]    Ascites Fluid from Paracentesis    Final result Component Value Units   Protein Fluid Source Abdomen    Protein Total Fluid 1.4 g/dL            06/18/2025 1118 06/20/2025 1116 Ascites Fluid Aerobic Bacterial Culture Routine With Gram Stain [[id number]]   Ascites Fluid from Peritoneum    Preliminary result Component Value   Culture No growth after 1 day  [P]    Gram Stain Result No organisms seen  [P]     3+ WBC seen  [P]     Predominantly PMNs               06/18/2025 1118 06/20/2025 1133 Cytology, non-gynecologic [[id number]]   Ascites Fluid from  Peritoneum    Final result Component Value   Final Diagnosis Specimen A     Interpretation:      Negative for malignancy     Adequacy:     Satisfactory for evaluation but limited by:, Scant cellularity         Clinical Information ascites with HCC        Gross Description A(A). Peritoneum, :A. Peritoneum, , Peritoneal Fluid:  Received 80 ml of hazy, red fluid, processed as 1 Pap stained Autocyte,  1 Curry stained cytospin and one hematoxylin and eosin stained cell block.                 Microscopic Description A formal microscopic exam is performed.     Performing Labs The technical component of this testing was completed at [facility].     Stain controls for all stains resulted within this report have been reviewed and show appropriate reactivity.               06/18/2025 1118 06/18/2025 1236 Cell Count Body Fluid [[id number]]    (Abnormal)   Ascites Fluid from Paracentesis    Final result Component Value Units   Color Red    Clarity Slightly Bloody    Cell Count Fluid Source Abdomen    Total Nucleated Cells 671 /uL            06/18/2025 1118 06/18/2025 1236 Differential Body Fluid [[id number]]    (Abnormal)   Ascites Fluid from Paracentesis    Final result Component Value Units   % Neutrophils 53 %   % Lymphocytes 33 %   % Monocyte/Macrophages 12 %   % Lining Cells 2 %   Absolute Neutrophils, Body Fluid 355.6 /uL                  Most Recent TSH and T4:  Recent Labs   Lab Test 02/23/25  0924   TSH 1.05     Most Recent Hemoglobin A1c:  Recent Labs   Lab Test 10/12/20  1113   A1C 5.5     Most Recent 6 glucoses:  Recent Labs   Lab Test 06/19/25  0609 06/18/25  0834 06/17/25  1040 06/16/25  1818 03/07/25  1443 02/24/25  0815   GLC 93 97 127* 116* 87 138*     Most Recent Urinalysis:No lab results found.  Most Recent ABG:No lab results found.  Most Recent ESR & CRP:No lab results found.  Most Recent Anemia Panel:  Recent Labs   Lab Test  06/20/25  0638 02/24/25  0815 02/23/25  0924 01/09/25  0530 06/11/24  0838   WBC 5.5   < >  --    < >  --    HGB 11.3*   < >  --    < >  --    HCT 31.9*   < >  --    < >  --    MCV 91   < >  --    < >  --    *   < >  --    < >  --    IRON  --   --   --   --  168*   IRONSAT  --   --   --   --  61*   FEB  --   --   --   --  276   SLADE  --   --   --   --  1,339*   B12  --   --  559  --   --     < > = values in this interval not displayed.     Most Recent CPK:No lab results found.,   Results for orders placed or performed during the hospital encounter of 06/16/25   US Abdomen Limited    Narrative    EXAM: US ABDOMEN LIMITED  LOCATION: [facility]  DATE: 6/16/2025    INDICATION: liver failure  COMPARISON: None.  TECHNIQUE: Limited abdominal ultrasound.    FINDINGS:    GALLBLADDER: Normal. No gallstones or sludge. Pericholecystic fluid or ascites. Gallbladder wall is thickened. This may be reactive secondary to ascites. Clinical correlation recommended. Negative sonographic Hinojosa's sign.    BILE DUCTS: No biliary dilatation. The common duct measures 3.8 mm.    LIVER: Coarsened echotexture, suggesting underlying fibrosis. Nodular contour. No focal mass. Portal vein thrombosis with trickle flow seen centrally.    RIGHT KIDNEY: No hydronephrosis.    PANCREAS: Bowel gas obscures portions of pancreas. The visualized portions are normal.    Small to moderate amount of ascites.      Impression    IMPRESSION:  1.  Cirrhotic liver with portal vein thrombosis.  2.  Small to moderate amount of ascites.  3.  Gallbladder wall thickening may be reactive secondary to ascites. No gallstones or sludge.       CT Abdomen Pelvis w Contrast    Narrative    EXAM: CT ABDOMEN PELVIS W CONTRAST  LOCATION: [facility]  DATE: 6/17/2025    INDICATION: painless jaundice.  COMPARISON: Ultrasound from 6/16/2025.  TECHNIQUE: CT scan of the abdomen and pelvis was performed following injection of IV  contrast. Multiplanar reformats were obtained. Dose reduction techniques were used.  CONTRAST: 98 mL Isovue 370    FINDINGS:   LOWER CHEST: Normal.    HEPATOBILIARY: Diffusely heterogeneous enhancement throughout the liver, particularly affecting segments 4, 7, and 8. There is extensive enhancing tumor thrombus within the intrahepatic portal veins as well as the extrahepatic portal veins continuing   into the portosplenic confluence (image 101 of series 7) as well as the proximal superior mesenteric vein (image 107). Small amount of perihepatic ascites. The liver is enlarged, with the right liver lobe measuring 22 cm craniocaudal. No gallstones.    PANCREAS: Normal.    SPLEEN: Spleen is mildly enlarged, measuring 14 cm in length.    ADRENAL GLANDS: Normal.    KIDNEYS/BLADDER: Normal.    BOWEL: No bowel obstruction or free air. Small to moderate amount of ascites throughout the abdomen and pelvis.    LYMPH NODES: Normal.    VASCULATURE: Upper abdominal venous collaterals. Mild atherosclerotic change of the abdominal aorta.    PELVIC ORGANS: Normal.    MUSCULOSKELETAL: Normal.      Impression    IMPRESSION:   1.  Diffusely abnormal enhancement pattern throughout the liver, appearance highly concerning for infiltrative hepatocellular carcinoma.     2.  Extensive tumor thrombus in the intrahepatic portal veins, extending into the extrahepatic portal vein, portosplenic confluence, and superior mesenteric vein.    3.  Small to moderate volume ascites. Mild splenomegaly.   MR Liver wo & w Contrast    Narrative    EXAM: MR LIVER W/O and W CONTRAST  LOCATION: [facility]  DATE: 6/17/2025    INDICATION: Concern for HCC  COMPARISON: CT June 16, 2025  TECHNIQUE: Routine MRI liver protocol including T1 in/out phase, diffusion, multiplane T2, and dynamic T1 with IV contrast.    CONTRAST: 9 mL Gadavist    FINDINGS:     LIVER: Large infiltrative mass in the right hepatic lobe measuring up to 15.6 x 14.0 cm  in maximum transverse dimensions (series 1200 image 33). The mass demonstrates heterogeneous enhancement with washout and restricted diffusion in. There are numerous   satellite lesions throughout both lobes of the liver. There is extensive tumor thrombus involving the intrahepatic portal veins and extending into the main portal vein, superior mesenteric vein, and splenic vein.    The left and right hepatic veins appear patent. The middle hepatic vein is thrombosed and there is likely tumor thrombus within the central middle hepatic vein.    ADDITIONAL FINDINGS: The gallbladder is unremarkable. No biliary ductal dilatation. Mild splenomegaly. The pancreas is unremarkable. The bilateral adrenal glands and kidneys are unremarkable. No lymphadenopathy. Small volume ascites and diffuse   mesenteric edema. The visualized bowel is unremarkable. No abdominal aortic aneurysm. There are upper abdominal varices. No suspicious osseous lesions.      Impression    IMPRESSION:  1.  Infiltrative mass centered in the right hepatic lobe measuring up to 15.6 cm, likely infiltrative hepatocellular carcinoma, with numerous satellite lesions throughout both lobes of the liver. Extensive tumor thrombus throughout the intrahepatic   portal veins, and extending into the main portal vein, superior mesenteric vein, and splenic vein.    2.  The middle hepatic vein is thrombosed with questionable tumor thrombus in the central middle hepatic vein.    3.  Sequela of portal hypertension including mild splenomegaly, small volume ascites, and upper abdominal varices.       US Paracentesis with Albumin    Narrative    EXAM:  1. PARACENTESIS  2. ULTRASOUND GUIDANCE  LOCATION: [facility]  DATE: 6/18/2025    INDICATION: Ascites.    PROCEDURE: Informed consent obtained. Time out performed. The abdomen was prepped and draped in a sterile fashion. 10 mL of 1% lidocaine was infused into local soft tissues. A 5 Irish catheter  system was introduced into the abdominal ascites under   ultrasound guidance.    1.7 liters of bloody fluid were removed and sent to lab if requested.    Patient tolerated procedure well.    Ultrasound imaging was obtained and placed in the patient's permanent medical record.      Impression    IMPRESSION:  1.  Status post ultrasound-guided paracentesis.    Reference CPT Code: 07991       Discharge Medications      Review of your medicines        START taking        Dose / Directions   apixaban ANTICOAGULANT 2.5 MG tablet  Commonly known as: ELIQUIS  Indication: DVT-PE Treatment      Dose: 2.5 mg  Take 1 tablet (2.5 mg) by mouth 2 times daily.  Quantity: 60 tablet  Refills: 0     cefuroxime 500 MG tablet  Commonly known as: CEFTIN  Used for: SBP (spontaneous bacterial peritonitis) (H)      Dose: 500 mg  Take 1 tablet (500 mg) by mouth 2 times daily for 12 days.  Quantity: 24 tablet  Refills: 0     furosemide 20 MG tablet  Commonly known as: LASIX  Used for: Other ascites      Dose: 20 mg  Start taking on: June 21, 2025  Take 1 tablet (20 mg) by mouth daily.  Quantity: 60 tablet  Refills: 0     omeprazole 20 MG DR capsule  Commonly known as: PriLOSEC  Used for: Gastroesophageal reflux disease without esophagitis      Dose: 20 mg  Take 1 capsule (20 mg) by mouth daily.  Quantity: 90 capsule  Refills: 0     oxyCODONE 5 MG tablet  Commonly known as: ROXICODONE  Used for: HCC (hepatocellular carcinoma) (H)      Dose: 5 mg  Take 1 tablet (5 mg) by mouth every 4 hours as needed for severe pain (IF pain not managed with non-pharmacological and non-opioid interventions).  Quantity: 10 tablet  Refills: 0     spironolactone 50 MG tablet  Commonly known as: ALDACTONE  Used for: Other ascites      Dose: 50 mg  Start taking on: June 21, 2025  Take 1 tablet (50 mg) by mouth daily.  Quantity: 90 tablet  Refills: 0            CONTINUE these medicines which have NOT CHANGED        Dose / Directions   albuterol 108 (90 Base) MCG/ACT  inhaler  Commonly known as: PROAIR HFA/PROVENTIL HFA/VENTOLIN HFA  Used for: Chronic allergic bronchitis      Dose: 2 puff  Inhale 2 puffs into the lungs every 6 hours as needed for shortness of breath / dyspnea or wheezing  Quantity: 8.5 g  Refills: 2     amLODIPine 5 MG tablet  Commonly known as: NORVASC  Used for: Essential hypertension      Dose: 5 mg  Take 1 tablet (5 mg) by mouth daily.  Quantity: 90 tablet  Refills: 1     cetirizine 10 MG tablet  Commonly known as: zyrTEC      Dose: 10 mg  Take 10 mg by mouth daily as needed for allergies.  Refills: 0            STOP taking      levETIRAcetam 500 MG tablet  Commonly known as: KEPPRA                  Where to get your medicines        These medications were sent to [facility] - [address] [place] [address]  [address] [place] [address] [address]      Phone: [phone]   apixaban ANTICOAGULANT 2.5 MG tablet  cefuroxime 500 MG tablet  furosemide 20 MG tablet  omeprazole 20 MG DR capsule  oxyCODONE 5 MG tablet  spironolactone 50 MG tablet       Allergies   Allergies   Allergen Reactions    Lisinopril Angioedema    Losartan Angioedema      Statement Selected

## 2025-06-20 NOTE — PLAN OF CARE
Goal Outcome Evaluation:    Orientation: A&Ox4     Vitals/Tele: vss on ra     IV Access/drains: piv sl     Diet: Reg     Mobility: Independent     GI/: continent of B&B    Wound/Skin: pt is jaundice, has paracentesis site on R side of abdomen.    Discharge Plan: home with follow up appointments with [facility]    AVS printed and education given, pt discharging home with meds and belongings.    See Flow sheets for assessment

## 2025-06-20 NOTE — PROGRESS NOTES
Hematology/Oncology Follow-up Note  [facility]    Date of Admission:  6/16/2025   Reason for Consult: Cirrhosis of liver with elevated AFP      ASSESSMENT : Yimi Gibbs is a 59 year old year old male who presented this hospitalization with abdominal pain and jaundice.     Abdominal Pain  Alcohol cirrhosis/jaundice  Liver Mass  Portal Vein Thrombosis  Alcohol Use Disorder    PLAN:  Urgent outpatient Oncology consult with Hepatobiliary Oncologist- referral placed  Outpatient consult with Hepatobiliary surgeon- referral placed  Eliquis 2.5 mg BID restarted this morning -DIC work up is negative. Plts improved to 123k. He is at high risk of bleeding due to thrombocytopenia and coagulopathy from liver dysfunction - hold Eliquis for platelets </=50k  Outpatient paracentesis while waiting Oncology outpatient consult- orders placed  Hem/Onc will provide written letter as requested by patient for possible trip reimbursement- given to patient today  Hem/Onc will sign off      Abdominal Pain  - Pain management per hospitalist team.     Liver Mass  Ascites, Alcoholic Cirrhosis, Transaminitis  6/2024: hepatitis workup  with negative Hep C, Hep B, alpha one anti trypsin, hemochromatosis. NO autoimmune workup noted.   - 6/16/25: presented to ED with abdominal pain and jaundice. On admission Alk Phos 582,  and , bilirubin of 6.8, Albumin of 3.8, normal Cr, AFP 4114.0, Hgb 12.1, Plts 88k  - 6/16/25: US abdomen w/ cirrhosis, portal vein thrombosis, small/ mod ascites. GB wall thickening may be 2/2 ascites, no stones or sludge.   -  CT of the abdomen and pelvis with diffusely abnormal enhancement pattern throughout the liver appearance highly concerning for  infiltrative hepatocellular carcinoma with extensive tumor thrombus in the intrahepatic portal veins extending to extrahepatic portal veins post splenic thrombus.  MRI of the liver showed infiltrative mass centered in the right hepatic lobe measuring  up to 15.6 cm likely infiltrated with numerous satellite lesions throughout both lobes of the liver extensive portal thrombus as discussed above.  Mild splenomegaly.  Small volume of ascites.    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 Started on IV ceftriaxone for SBP   - No need for a biopsy with the AFP levels as consistent with HCC with imaging  - will need palliative systemic therapy, however, will have him set up with surgery for further evaluation  - Urgent outpatient Hem/Onc consult requested for U- Hepatobiliary Oncologist  - GI following while inpatient  - 6/19/2025: Hgb 11.4>11.1, Plts 58k  - 6/20/2025: Hgb 11.3, Plts 123k      Portal Vein Thrombosis  - 6/16/25: US with portal vein thrombosis  - 6/17/25 started on Eliquis 5 mg BID,  slight drop in hemoglobin, Eliquis held. Patient noted nosebleeds while on 5 mg BID  - 6/18/25: Hemoglobin stabilized after paracentesis and and he was started back on Eliquis at reduced dose of 2.5 mg BID.  - 6/19/2025 : patient noted nose bleed this am, Holding Eliquis. DIC work up per medicine team, is negative. Fibrinogen is WNL, aPTT WNL, PT/INR stable, Plts 58k.   - Restart Eliquis 2.5 mg BID  - High risk of bleeding due to thrombocytopenia and coagulopathy from liver dysfunction - hold Eliquis for platelets </=50k    Alcohol Use Disorder  - medicine managing- CIWA discontinued  - patient refused chemical dependency consult      INTERVAL HISTORY:   Denies nosebleeds today.    EXAM:  Subjective  As per interval history    Objective  Constitutional: Pleasant male in no acute distress.  Eyes: No scleral icterus. No conjunctival erythema or discharge  Respiratory: speaking in full clear sentences without audible wheezes or hoarseness  Neuro: Cranial nerves II-XII intact  Skin: No visible lesions, bruising or rashes  Psych: appropriate mood and affect       Labs Reviewed: CBC,  CMP, labs as above   Imaging Reviewed:         25 minutes spent on the date of the encounter doing review of outside records, review of test results, interpretation of tests,  implementing/ creating plan, coordinating care, and documentation     Danica Torres PA-C  Department of Hematology and Oncology  [facility]      Securely message with Chanell

## 2025-06-20 NOTE — PLAN OF CARE
DATE & TIME: 6/19/25 5809-5287    Cognitive Concerns/ Orientation : Pt A/Ox4   BEHAVIOR & AGGRESSION TOOL COLOR: Green   ABNL VS/O2: VSS on RA  MOBILITY: Independent  PAIN MANAGMENT: Denies  DIET: Regular  BOWEL/BLADDER: Continent  ABNL LAB/BG: Na 132/Plt 58  DRAIN/DEVICES: IV SL  SKIN: Jaundice. Paracentesis site right abdomen, CDI  D/C DATE: Discharge possibly today pending restarting of Eliquis  OTHER IMPORTANT INFO: Edema bilateral lower extremities +1.

## 2025-06-20 NOTE — PROGRESS NOTES
CHART CHECK (No charge)     **Patient not physically seen. Recent lab results, imaging results, progress notes and vital signs reviewed during chart review.     Yimi Gibbs is a 59 year old male admitted on 6/16/2025. He presents with abdominal pain and jaundice      Portal vein thrombosis  Alcoholic cirrhosis of liver with ascites   Liver masses- consistent for HCC  Notes feeling fatigued for several weeks, notes swelling in R upper abdomen   AST//127 (up from previous). Alk phos 589. TBili 6.8>6.7 (1.3 3/2025). lipase 84. INR 1.24. MELD 17  CT A/P: 1.  Diffusely abnormal enhancement pattern throughout the liver, appearance highly concerning for infiltrative hepatocellular carcinoma. 2.  Extensive tumor thrombus in the intrahepatic portal veins, extending into the extrahepatic portal vein, portosplenic confluence, and superior mesenteric vein.  3.  Small to moderate volume ascites. Mild splenomegaly.  6/17 MRI of liver noted Infiltrative mass centered in the right hepatic lobe measuring up to 15.6 cm, likely infiltrative hepatocellular carcinoma, with numerous satellite lesions throughout both lobes of the liver. Extensive tumor thrombus throughout the intrahepatic portal veins, and extending into the main portal vein, superior mesenteric vein, and splenic vein.  AFP 4114  Findings consistent with hepatocellular carcinoma  Has had hepatitis workup in 06/2024 with negative Hep C, Hep B, alpha one anti trypsin, hemochromatosis.      - Hematology/oncology consulted as well was surgical oncology  - started on Eliquis  - daily CMP, INR, CBC  - Autoimmune hepatitis labs: ZULMA borderline, AMA normal, ASMA normal  - continue diuretics (furosemide 20 mg and spironolactone 50 mg) -plan to titrate based on tolerability  - regular diet  - GI will follow peripherally    MELD 3.0: 21 at 6/19/2025 11:13 AM  MELD-Na: 22 at 6/19/2025 11:13 AM  Calculated from:  Serum Creatinine: 0.88 mg/dL (Using min of 1 mg/dL) at  6/19/2025  6:09 AM  Serum Sodium: 132 mmol/L at 6/19/2025  6:09 AM  Total Bilirubin: 6.7 mg/dL at 6/18/2025  8:34 AM  Serum Albumin: 3.3 g/dL at 6/18/2025  8:34 AM  INR(ratio): 1.52 at 6/19/2025 11:13 AM  Age at listing (hypothetical): 59 years  Sex: Male at 6/19/2025 11:13 AM      Ascites secondary to alcoholic cirrhosis;  Possible SBP   *Patient was started on Lasix 20 mg p.o. daily and spironolactone 50 mg oral daily to help with ascites  *Patient still complains of significant abdominal bloating and discomfort.  *Ultrasound-guided paracentesis ordered with fluid studies.  *1.7 L of bloody fluid were removed and sent to lab.  Fluid studies done showed  consistent with SBP   --Started on IV ceftriaxone for SBP   --Cytology pending     Ervin Benites PA-C,  6/19/2025  [facility]  Office : [phone]  Cell: [phone] (Dr. Knight)

## 2025-06-23 LAB
BACTERIA FLD CULT: NO GROWTH
GRAM STAIN RESULT: NORMAL
GRAM STAIN RESULT: NORMAL

## 2025-06-24 ENCOUNTER — ONCOLOGY VISIT (OUTPATIENT)
Dept: ONCOLOGY | Facility: CLINIC | Age: 60
End: 2025-06-24
Attending: INTERNAL MEDICINE
Payer: COMMERCIAL

## 2025-06-24 ENCOUNTER — TELEPHONE (OUTPATIENT)
Dept: OTHER | Facility: CLINIC | Age: 60
End: 2025-06-24

## 2025-06-24 VITALS
RESPIRATION RATE: 16 BRPM | BODY MASS INDEX: 26.35 KG/M2 | DIASTOLIC BLOOD PRESSURE: 82 MMHG | OXYGEN SATURATION: 97 % | HEIGHT: 73 IN | HEART RATE: 93 BPM | WEIGHT: 198.8 LBS | SYSTOLIC BLOOD PRESSURE: 125 MMHG

## 2025-06-24 DIAGNOSIS — G89.3 CANCER ASSOCIATED PAIN: Primary | ICD-10-CM

## 2025-06-24 DIAGNOSIS — C22.0 HCC (HEPATOCELLULAR CARCINOMA) (H): ICD-10-CM

## 2025-06-24 PROCEDURE — 99205 OFFICE O/P NEW HI 60 MIN: CPT | Performed by: INTERNAL MEDICINE

## 2025-06-24 PROCEDURE — 99213 OFFICE O/P EST LOW 20 MIN: CPT | Performed by: INTERNAL MEDICINE

## 2025-06-24 PROCEDURE — 99417 PROLNG OP E/M EACH 15 MIN: CPT | Performed by: INTERNAL MEDICINE

## 2025-06-24 RX ORDER — OXYCODONE HYDROCHLORIDE 5 MG/1
5 TABLET ORAL EVERY 6 HOURS PRN
Qty: 30 TABLET | Refills: 0 | Status: SHIPPED | OUTPATIENT
Start: 2025-06-24

## 2025-06-24 ASSESSMENT — PAIN SCALES - GENERAL: PAINLEVEL_OUTOF10: NO PAIN (0)

## 2025-06-24 NOTE — LETTER
6/24/2025      Yimi Gibbs  [address] [address]      Dear Colleague,    Thank you for referring your patient, Yimi Gibbs, to the [facility]. Please see a copy of my visit note below.    ONCOLOGY HISTORY: Mr. Gibbs is a gentleman with hepatocellular carcinoma.    1.  On 06/16/2025, ultrasound abdomen for elevated LFT revealed liver cirrhosis with portal vein thrombosis and ascites.  - CT abdomen and pelvis on 06/17/2025 revealed diffuse abnormal enhancement throughout the liver suspicious for infiltrative hepatocellular carcinoma.  There is extensive tumor thrombus.  Mild splenomegaly.  - MRI liver on 06/17/2025 revealed infiltrative mass in the right hepatic lobe measuring up to 15.6 cm with numerous satellite lesions throughout both lobes of liver.  There is extensive tumor thrombus.  There is mild splenomegaly, ascites and upper abdominal varices.  - Paracentesis on 06/18/2025 is negative for malignancy.  2.  On 06/16/2025, AFP of 4114.  - In 06/2024, hepatitis B and hepatitis C is negative.    Subjective:  Mr. Gibbs is a 59-year-old gentleman with newly diagnosed hepatocellular carcinoma.  Patient was hospitalized on 06/16/2025 for abdominal pain and jaundice.  He had multiple investigations done.  Patient found to have hepatocellular carcinoma.  MRI liver revealed infiltrative mass.  AFP is highly elevated.  This is clinically consistent with hepatocellular carcinoma.    Patient overall does not feel well.  He is very weak.  He has ascites.  Abdomen is bloated.  Mild abdominal discomfort.  No severe abdominal pain.  Some nausea.  No vomiting.  Appetite is decreased.  He gets short of breath on minimal exertion.  He is scheduled for paracentesis this Friday.    Exam:  He is alert and oriented x 3.  He looked very weak.  He is jaundiced.  Vitals: Reviewed.  Skin: There is ecchymosis in the right side of the abdomen.  Rest of the system  is not examined.    Labs: Reviewed.    Assessment:  1.  A 59-year-old gentleman with newly diagnosed hepatocellular carcinoma.  2.  Liver cirrhosis secondary to alcohol use.  3.  Ascites.  4.  Thrombocytopenia from liver cirrhosis.  5.  Normocytic anemia secondary to liver cirrhosis.  6.  Elevated LFT.  7.  Extensive tumor thrombus involving portal vein, superior mesenteric vein and splenic vein.  8.  Generalized weakness.  9.  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    Plan:  -EGD by Dr. Knight.  -IR consult for TACE procedure.  -Appointment with Dr. Trey Ceballos.  -Continue with paracentesis.  -Take oxycodone as needed.  -Continue Eliquis.  Will consider stopping it in near future.  -See me next week.    Discussion:  1.  I had long discussion with the patient and his wife.  Investigations were all reviewed.  Patient has newly diagnosed hepatocellular carcinoma.  Explained to them that clinically it is consistent with hepatocellular carcinoma.  There is large intrahepatic mass.  There is liver cirrhosis.  AFP is highly elevated.  This is all consistent with hepatocellular carcinoma.  No liver biopsy needed.    Patient has child Mendez class C liver disease.    2.  I had long discussion regarding treatment options.    Discussed regarding role of surgery.  In my opinion, this is unresectable disease. Will schedule patient to see Dr. Trey Ceballos.    Discussed regarding TACE.  In my opinion, patient not a candidate for TACE. Will get a consult from interventional radiologist.    Discussed regarding systemic treatment.  Explained to them that in child Mendez class C liver cirrhosis, systemic treatment is not recommended as outcome is poor.  In child Mendez class A or B palliative systemic treatment is recommended.  There are different treatment options including atezolizumab with bevacizumab, tremelimumab with durvalumab or ipilimumab with nivolumab.    Wife and patient had multiple questions regarding treatment.  They are hoping that patient can  get systemic treatment.    I will see them next week with labs and discuss more about treatment options versus hospice.    3.  Patient never had EGD.  Explained to the patient that he may have esophageal varices which can bleed.  Will get EGD done.    4.  Patient is on Eliquis for thrombosis.  For now he will continue on Eliquis.  We may consider stopping it in near future as thrombus is likely tumor thrombus.    5.  Patient will continue on paracentesis as needed for symptomatic relief.    6.  He will continue on oxycodone as needed for any abdominal pain.    7.  They had multiple questions which were all answered.  I will see him next week.    Total visit time of 80 minutes.  Time spent in today's visit, review of chart/investigations today and documentation today.                              Again, thank you for allowing me to participate in the care of your patient.        Sincerely,        Reggie Ramos MD    Electronically signed

## 2025-06-24 NOTE — TELEPHONE ENCOUNTER
IR referral to [facility], for TACE  Under review with Dr. Lopez.  Karena Young RN  IR nurse clinician  [phone]

## 2025-06-24 NOTE — PATIENT INSTRUCTIONS
-EGD by Dr. Knight.  -IR consult for TACE procedure.  -Appointment with Dr. Trey Ceballos.  -Continue with paracentesis.  -Take oxycodone as needed.  -See me next week.

## 2025-06-24 NOTE — LETTER
6/24/2025         RE: Yimi Gibbs  [address] [address]      ONCOLOGY HISTORY: Mr. Gibbs is a gentleman with hepatocellular carcinoma.    1.  On 06/16/2025, ultrasound abdomen for elevated LFT revealed liver cirrhosis with portal vein thrombosis and ascites.  - CT abdomen and pelvis on 06/17/2025 revealed diffuse abnormal enhancement throughout the liver suspicious for infiltrative hepatocellular carcinoma.  There is extensive tumor thrombus.  Mild splenomegaly.  - MRI liver on 06/17/2025 revealed infiltrative mass in the right hepatic lobe measuring up to 15.6 cm with numerous satellite lesions throughout both lobes of liver.  There is extensive tumor thrombus.  There is mild splenomegaly, ascites and upper abdominal varices.  - Paracentesis on 06/18/2025 is negative for malignancy.  2.  On 06/16/2025, AFP of 4114.  - In 06/2024, hepatitis B and hepatitis C is negative.    Subjective:  Mr. Gibbs is a 59-year-old gentleman with newly diagnosed hepatocellular carcinoma.  Patient was hospitalized on 06/16/2025 for abdominal pain and jaundice.  He had multiple investigations done.  Patient found to have hepatocellular carcinoma.  MRI liver revealed infiltrative mass.  AFP is highly elevated.  This is clinically consistent with hepatocellular carcinoma.    Patient overall does not feel well.  He is very weak.  He has ascites.  Abdomen is bloated.  Mild abdominal discomfort.  No severe abdominal pain.  Some nausea.  No vomiting.  Appetite is decreased.  He gets short of breath on minimal exertion.  He is scheduled for paracentesis this Friday.    Exam:  He is alert and oriented x 3.  He looked very weak.  He is jaundiced.  Vitals: Reviewed.  Skin: There is ecchymosis in the right side of the abdomen.  Rest of the system is not examined.    Labs: Reviewed.    Assessment:  1.  A 59-year-old gentleman with newly diagnosed hepatocellular carcinoma.  2.  Liver cirrhosis secondary to  alcohol use.  3.  Ascites.  4.  Thrombocytopenia from liver cirrhosis.  5.  Normocytic anemia secondary to liver cirrhosis.  6.  Elevated LFT.  7.  Extensive tumor thrombus involving portal vein, superior mesenteric vein and splenic vein.  8.  Generalized weakness.  9.  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    Plan:  -EGD by Dr. Knight.  -IR consult for TACE procedure.  -Appointment with Dr. Trey Ceballos.  -Continue with paracentesis.  -Take oxycodone as needed.  -Continue Eliquis.  Will consider stopping it in near future.  -See me next week.    Discussion:  1.  I had long discussion with the patient and his wife.  Investigations were all reviewed.  Patient has newly diagnosed hepatocellular carcinoma.  Explained to them that clinically it is consistent with hepatocellular carcinoma.  There is large intrahepatic mass.  There is liver cirrhosis.  AFP is highly elevated.  This is all consistent with hepatocellular carcinoma.  No liver biopsy needed.    Patient has child Mendez class C liver disease.    2.  I had long discussion regarding treatment options.    Discussed regarding role of surgery.  In my opinion, this is unresectable disease. Will schedule patient to see Dr. Trey Ceballos.    Discussed regarding TACE.  In my opinion, patient not a candidate for TACE. Will get a consult from interventional radiologist.    Discussed regarding systemic treatment.  Explained to them that in child Mendez class C liver cirrhosis, systemic treatment is not recommended as outcome is poor.  In child Mendez class A or B palliative systemic treatment is recommended.  There are different treatment options including atezolizumab with bevacizumab, tremelimumab with durvalumab or ipilimumab with nivolumab.    Wife and patient had multiple questions regarding treatment.  They are hoping that patient can get systemic treatment.    I will see them next week with labs and discuss more about treatment options versus hospice.    3.  Patient never had EGD.  Explained  to the patient that he may have esophageal varices which can bleed.  Will get EGD done.    4.  Patient is on Eliquis for thrombosis.  For now he will continue on Eliquis.  We may consider stopping it in near future as thrombus is likely tumor thrombus.    5.  Patient will continue on paracentesis as needed for symptomatic relief.    6.  He will continue on oxycodone as needed for any abdominal pain.    7.  They had multiple questions which were all answered.  I will see him next week.    Total visit time of 80 minutes.  Time spent in today's visit, review of chart/investigations today and documentation today.                                  Reggie Ramos MD

## 2025-06-25 ENCOUNTER — TELEPHONE (OUTPATIENT)
Dept: GASTROENTEROLOGY | Facility: CLINIC | Age: 60
End: 2025-06-25
Payer: COMMERCIAL

## 2025-06-25 ENCOUNTER — ONCOLOGY VISIT (OUTPATIENT)
Dept: SURGERY | Facility: CLINIC | Age: 60
End: 2025-06-25
Attending: INTERNAL MEDICINE
Payer: COMMERCIAL

## 2025-06-25 ENCOUNTER — PATIENT OUTREACH (OUTPATIENT)
Dept: SURGERY | Facility: CLINIC | Age: 60
End: 2025-06-25
Payer: COMMERCIAL

## 2025-06-25 VITALS
HEART RATE: 93 BPM | OXYGEN SATURATION: 99 % | DIASTOLIC BLOOD PRESSURE: 85 MMHG | RESPIRATION RATE: 16 BRPM | BODY MASS INDEX: 26.24 KG/M2 | WEIGHT: 198.85 LBS | SYSTOLIC BLOOD PRESSURE: 131 MMHG

## 2025-06-25 DIAGNOSIS — C22.0 HCC (HEPATOCELLULAR CARCINOMA) (H): ICD-10-CM

## 2025-06-25 DIAGNOSIS — K70.31 ALCOHOLIC CIRRHOSIS OF LIVER WITH ASCITES (H): Primary | ICD-10-CM

## 2025-06-25 ASSESSMENT — PAIN SCALES - GENERAL: PAINLEVEL_OUTOF10: NO PAIN (0)

## 2025-06-25 NOTE — TELEPHONE ENCOUNTER
Left message for pt to return call.    Will verify with pt when call is returned if he would like to follow up here or [facility] when call is returned.     If he wants to follow here, can be scheduled Friday at 0800 for a virtual with Dr. Wilson

## 2025-06-25 NOTE — PROGRESS NOTES
"Oncology Rooming Note    June 25, 2025 9:57 AM   Yimi Gibbs is a 59 year old male who presents for:    Chief Complaint   Patient presents with    Oncology Clinic Visit     Initial Vitals: /85   Pulse 93   Resp 16   Wt 90.2 kg (198 lb 13.7 oz)   SpO2 99%   BMI 26.24 kg/m   Estimated body mass index is 26.24 kg/m  as calculated from the following:    Height as of 6/24/25: 1.854 m (6' 1\").    Weight as of this encounter: 90.2 kg (198 lb 13.7 oz). Body surface area is 2.16 meters squared.  No Pain (0) Comment: Data Unavailable   No LMP for male patient.  Allergies reviewed: Yes  Medications reviewed: Yes    Medications: Medication refills not needed today.  Pharmacy name entered into [employer]: [id number] [address]    Frailty Screening:   Is the patient here for a new oncology consult visit in cancer care? 2. No    PHQ9:  Did this patient require a PHQ9?: No      Clinical concerns:  doctor was notified.      Renetta Valdez CMA            "

## 2025-06-25 NOTE — TELEPHONE ENCOUNTER
Reviewed with Dr. Phyllis Lopez.  Patient is not a candidate for TACE/Y90 due to elevated bilirubin and large amount of tumor involvement in the right lobe.  Will cancel IR referral and notify Dr. Ramos.  Karena Young RN  IR nurse clinician  [phone]

## 2025-06-25 NOTE — LETTER
"6/25/2025      Yimi Gibbs  [address] [facility] [address]      Dear Colleague,    Thank you for referring your patient, Yimi Gibbs, to the [facility]. Please see a copy of my visit note below.    Oncology Rooming Note    June 25, 2025 9:57 AM   Yimi Gibbs is a 59 year old male who presents for:    Chief Complaint   Patient presents with     Oncology Clinic Visit     Initial Vitals: /85   Pulse 93   Resp 16   Wt 90.2 kg (198 lb 13.7 oz)   SpO2 99%   BMI 26.24 kg/m   Estimated body mass index is 26.24 kg/m  as calculated from the following:    Height as of 6/24/25: 1.854 m (6' 1\").    Weight as of this encounter: 90.2 kg (198 lb 13.7 oz). Body surface area is 2.16 meters squared.  No Pain (0) Comment: Data Unavailable   No LMP for male patient.  Allergies reviewed: Yes  Medications reviewed: Yes    Medications: Medication refills not needed today.  Pharmacy name entered into [employer]: [id number] [address]    Frailty Screening:   Is the patient here for a new oncology consult visit in cancer care? 2. No    PHQ9:  Did this patient require a PHQ9?: No      Clinical concerns:  doctor was notified.      Renetta Valdez, [facility]              Mr. Gibbs is a 59-year-old gentleman recently diagnosed with hepatocellular carcinoma which appears to be quite diffuse throughout the abdomen.  He has been having fatigue for several months but more recently developed jaundice and abdominal bloating.  Imaging has revealed extensive tissue abnormality throughout his liver consistent with hepatocellular cancer.  He also has obliteration of his portal venous system.  There is associated ascites which he is having tapped and has been placed on diuretics.  He presents with his wife today.  He is visibly jaundice and has a protuberant abdomen.  His alpha-fetoprotein is 4114.  I was asked to see him for " discussion regarding any liver directed therapy.    I do long discussion with the patient and his wife today that because of the extent of disease, no surgical resection would be possible.  In addition I discussed that transplantation or catheter-based therapies would also not be appropriate.  He has significant disease burden and it is unclear whether he will be able to tolerate even systemic therapy.  I did discuss that we would present him at our multidisciplinary liver tumor conference this week to see whether our medical oncology group would consider him for immunotherapy or not.  The patient is currently seeing Dr. Ramos who will ultimately make that decision as well.  I did discuss with the patient that I think he would benefit from a palliative care consultation and we will put that in today.  In addition we will make sure that he is seen by hepatology.    The patient and his wife are very understanding of the plan.  They will continue ongoing cares with Dr. Ramos as scheduled.    60 minutes was spent on this case, more than half that time was in face-to-face time, the remainder was in review of history, imaging, coordination of care.    Again, thank you for allowing me to participate in the care of your patient.        Sincerely,        Trey Ceballos MD    Electronically signed

## 2025-06-25 NOTE — PROGRESS NOTES
New Patient Oncology Nurse Navigator Note     Referring provider: Dr. Ramos     Referring Clinic/Organization: [facility]     Referred to: Surgical Oncology -  Hepatobiliary / GI Cancers     Requested provider (if applicable): Dr. Trey Ceballos    Referral Received: 06/25/25       Evaluation for : Hepatocellular Cancer      Clinical History (per Nurse review of records provided):      See book marked documents:     Referring MD office note  Pathology report  Imaging reports   Procedure report       Allergies   Allergen Reactions    Lisinopril Angioedema    Losartan Angioedema        Past Medical History:   Diagnosis Date    Abductor spasmodic dysphonia     Hyperlipidaemia LDL goal < 100     Hypertension        Past Surgical History:   Procedure Laterality Date    BUNIONECTOMY  2010    EXCISE MASS FOOT N/A 2/14/2023    Procedure: Fifth Metatarsal Head Resection Left Foot, debridment of Ulcer;  Surgeon: Kerrie Childs DPM;  Location: [facility] OR    SEPTOPLASTY  2007    THYROPLASTY  2001       Current Outpatient Medications   Medication Sig Dispense Refill    albuterol (PROAIR HFA/PROVENTIL HFA/VENTOLIN HFA) 108 (90 Base) MCG/ACT inhaler Inhale 2 puffs into the lungs every 6 hours as needed for shortness of breath / dyspnea or wheezing 8.5 g 2    amLODIPine (NORVASC) 5 MG tablet Take 1 tablet (5 mg) by mouth daily. 90 tablet 1    apixaban ANTICOAGULANT (ELIQUIS) 2.5 MG tablet Take 1 tablet (2.5 mg) by mouth 2 times daily. 60 tablet 0    cefuroxime (CEFTIN) 500 MG tablet Take 1 tablet (500 mg) by mouth 2 times daily for 12 days. 24 tablet 0    cetirizine (ZYRTEC) 10 MG tablet Take 10 mg by mouth daily as needed for allergies.      furosemide (LASIX) 20 MG tablet Take 1 tablet (20 mg) by mouth daily. 60 tablet 0    omeprazole (PRILOSEC) 20 MG DR capsule Take 1 capsule (20 mg) by mouth daily. 90 capsule 0    oxyCODONE (ROXICODONE) 5 MG tablet Take 1 tablet (5 mg) by mouth every 6 hours as needed for severe pain (IF pain  not managed with non-pharmacological and non-opioid interventions). 30 tablet 0    spironolactone (ALDACTONE) 50 MG tablet Take 1 tablet (50 mg) by mouth daily. 90 tablet 0        Patient Active Problem List   Diagnosis    Dyslipidemia    Dysphonia, spasmodic    Essential hypertension    Ulnar neuropathy of left upper extremity    Chronic allergic bronchitis    Transaminitis    Alopecia areata    Hepatic fibrosis    Hepatic steatosis    Seizure (H)    Portal vein thrombosis    Alcoholic cirrhosis of liver with ascites (H)         Clinical Assessment / Barriers to Care (Per Nurse):    None at this time.     Records Location:     [facility]     Records Needed:     NONE AT THIS TIME      Additional testing needed prior to consult:     NONE AT THIS TIME    Referral updates and Plan:       Consult with Surgical Oncology     Melita Green, RN, BSN   Surgical Oncology New Patient Nurse Navigator  [facility]  [phone]

## 2025-06-25 NOTE — PROGRESS NOTES
Mr. Gibbs is a 59-year-old gentleman recently diagnosed with hepatocellular carcinoma which appears to be quite diffuse throughout the abdomen.  He has been having fatigue for several months but more recently developed jaundice and abdominal bloating.  Imaging has revealed extensive tissue abnormality throughout his liver consistent with hepatocellular cancer.  He also has obliteration of his portal venous system.  There is associated ascites which he is having tapped and has been placed on diuretics.  He presents with his wife today.  He is visibly jaundice and has a protuberant abdomen.  His alpha-fetoprotein is 4114.  I was asked to see him for discussion regarding any liver directed therapy.    I do long discussion with the patient and his wife today that because of the extent of disease, no surgical resection would be possible.  In addition I discussed that transplantation or catheter-based therapies would also not be appropriate.  He has significant disease burden and it is unclear whether he will be able to tolerate even systemic therapy.  I did discuss that we would present him at our multidisciplinary liver tumor conference this week to see whether our medical oncology group would consider him for immunotherapy or not.  The patient is currently seeing Dr. Ramos who will ultimately make that decision as well.  I did discuss with the patient that I think he would benefit from a palliative care consultation and we will put that in today.  In addition we will make sure that he is seen by hepatology.    The patient and his wife are very understanding of the plan.  They will continue ongoing cares with Dr. Ramos as scheduled.    60 minutes was spent on this case, more than half that time was in face-to-face time, the remainder was in review of history, imaging, coordination of care.

## 2025-06-25 NOTE — PROGRESS NOTES
ONCOLOGY HISTORY: Mr. Gibbs is a gentleman with hepatocellular carcinoma.    1.  On 06/16/2025, ultrasound abdomen for elevated LFT revealed liver cirrhosis with portal vein thrombosis and ascites.  - CT abdomen and pelvis on 06/17/2025 revealed diffuse abnormal enhancement throughout the liver suspicious for infiltrative hepatocellular carcinoma.  There is extensive tumor thrombus.  Mild splenomegaly.  - MRI liver on 06/17/2025 revealed infiltrative mass in the right hepatic lobe measuring up to 15.6 cm with numerous satellite lesions throughout both lobes of liver.  There is extensive tumor thrombus.  There is mild splenomegaly, ascites and upper abdominal varices.  - Paracentesis on 06/18/2025 is negative for malignancy.  2.  On 06/16/2025, AFP of 4114.  - In 06/2024, hepatitis B and hepatitis C is negative.    Subjective:  Mr. Gibbs is a 59-year-old gentleman with newly diagnosed hepatocellular carcinoma.  Patient was hospitalized on 06/16/2025 for abdominal pain and jaundice.  He had multiple investigations done.  Patient found to have hepatocellular carcinoma.  MRI liver revealed infiltrative mass.  AFP is highly elevated.  This is clinically consistent with hepatocellular carcinoma.    Patient overall does not feel well.  He is very weak.  He has ascites.  Abdomen is bloated.  Mild abdominal discomfort.  No severe abdominal pain.  Some nausea.  No vomiting.  Appetite is decreased.  He gets short of breath on minimal exertion.  He is scheduled for paracentesis this Friday.    Exam:  He is alert and oriented x 3.  He looked very weak.  He is jaundiced.  Vitals: Reviewed.  Skin: There is ecchymosis in the right side of the abdomen.  Rest of the system is not examined.    Labs: Reviewed.    Assessment:  1.  A 59-year-old gentleman with newly diagnosed hepatocellular carcinoma.  2.  Liver cirrhosis secondary to alcohol use.  3.  Ascites.  4.  Thrombocytopenia from liver cirrhosis.  5.  Normocytic anemia secondary  to liver cirrhosis.  6.  Elevated LFT.  7.  Extensive tumor thrombus involving portal vein, superior mesenteric vein and splenic vein.  8.  Generalized weakness.  9.  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    Plan:  -EGD by Dr. nKight.  -IR consult for TACE procedure.  -Appointment with Dr. Trey Ceballos.  -Continue with paracentesis.  -Take oxycodone as needed.  -Continue Eliquis.  Will consider stopping it in near future.  -See me next week.    Discussion:  1.  I had long discussion with the patient and his wife.  Investigations were all reviewed.  Patient has newly diagnosed hepatocellular carcinoma.  Explained to them that clinically it is consistent with hepatocellular carcinoma.  There is large intrahepatic mass.  There is liver cirrhosis.  AFP is highly elevated.  This is all consistent with hepatocellular carcinoma.  No liver biopsy needed.    Patient has child Mendez class C liver disease.    2.  I had long discussion regarding treatment options.    Discussed regarding role of surgery.  In my opinion, this is unresectable disease. Will schedule patient to see Dr. Trey Ceballos.    Discussed regarding TACE.  In my opinion, patient not a candidate for TACE. Will get a consult from interventional radiologist.    Discussed regarding systemic treatment.  Explained to them that in child Mendez class C liver cirrhosis, systemic treatment is not recommended as outcome is poor.  In child Mendez class A or B palliative systemic treatment is recommended.  There are different treatment options including atezolizumab with bevacizumab, tremelimumab with durvalumab or ipilimumab with nivolumab.    Wife and patient had multiple questions regarding treatment.  They are hoping that patient can get systemic treatment.    I will see them next week with labs and discuss more about treatment options versus hospice.    3.  Patient never had EGD.  Explained to the patient that he may have esophageal varices which can bleed.  Will get EGD done.    4.  Patient  is on Eliquis for thrombosis.  For now he will continue on Eliquis.  We may consider stopping it in near future as thrombus is likely tumor thrombus.    5.  Patient will continue on paracentesis as needed for symptomatic relief.    6.  He will continue on oxycodone as needed for any abdominal pain.    7.  They had multiple questions which were all answered.  I will see him next week.    Total visit time of 80 minutes.  Time spent in today's visit, review of chart/investigations today and documentation today.

## 2025-06-26 ENCOUNTER — PATIENT OUTREACH (OUTPATIENT)
Dept: CARE COORDINATION | Facility: CLINIC | Age: 60
End: 2025-06-26
Payer: COMMERCIAL

## 2025-06-26 NOTE — TELEPHONE ENCOUNTER
Pt's wife returned call.    Pt is established with [facility] and would like to continue following there.  They will call and schedule a follow up.

## 2025-06-27 ENCOUNTER — HOSPITAL ENCOUNTER (OUTPATIENT)
Dept: ULTRASOUND IMAGING | Facility: CLINIC | Age: 60
Discharge: HOME | End: 2025-06-27
Attending: INTERNAL MEDICINE | Admitting: INTERNAL MEDICINE
Payer: COMMERCIAL

## 2025-06-27 VITALS — OXYGEN SATURATION: 100 % | DIASTOLIC BLOOD PRESSURE: 68 MMHG | HEART RATE: 72 BPM | SYSTOLIC BLOOD PRESSURE: 112 MMHG

## 2025-06-27 DIAGNOSIS — R18.8 OTHER ASCITES: ICD-10-CM

## 2025-06-27 PROCEDURE — 272N000706 US PARACENTESIS WITHOUT ALBUMIN

## 2025-06-27 NOTE — PROGRESS NOTES
Pt was in Radiology today for a paracentesis. Procedure performed by Dr Martinez Procedure was tolerated well, vitals remained stable.2650 cc's of blood tinged colored fluid removed. Pt had a large bruise on arrival on right abdomen from last tap. Written and verbal instructions were reviewed here is no evidence of bleeding upon discharge.  Pt left department in stable satisfactory condition with wife.

## 2025-06-30 ENCOUNTER — PATIENT OUTREACH (OUTPATIENT)
Dept: ONCOLOGY | Facility: CLINIC | Age: 60
End: 2025-06-30
Payer: COMMERCIAL

## 2025-06-30 NOTE — PROGRESS NOTES
Writer called  patient's spouse, Antonia, to review upcoming paracentesis appointments. I was able to add one on for today  and had to [facility].    I provided my direct contact information requesting a return call.     Krystal Salcedo RN

## 2025-07-02 ENCOUNTER — ONCOLOGY VISIT (OUTPATIENT)
Dept: ONCOLOGY | Facility: CLINIC | Age: 60
End: 2025-07-02
Attending: INTERNAL MEDICINE
Payer: COMMERCIAL

## 2025-07-02 VITALS
DIASTOLIC BLOOD PRESSURE: 69 MMHG | TEMPERATURE: 98 F | HEART RATE: 87 BPM | BODY MASS INDEX: 25.46 KG/M2 | OXYGEN SATURATION: 99 % | WEIGHT: 193 LBS | SYSTOLIC BLOOD PRESSURE: 104 MMHG | RESPIRATION RATE: 16 BRPM

## 2025-07-02 DIAGNOSIS — C22.0 HCC (HEPATOCELLULAR CARCINOMA) (H): Primary | ICD-10-CM

## 2025-07-02 PROCEDURE — 99213 OFFICE O/P EST LOW 20 MIN: CPT | Performed by: INTERNAL MEDICINE

## 2025-07-02 PROCEDURE — 99215 OFFICE O/P EST HI 40 MIN: CPT | Performed by: INTERNAL MEDICINE

## 2025-07-02 ASSESSMENT — PAIN SCALES - GENERAL: PAINLEVEL_OUTOF10: NO PAIN (0)

## 2025-07-02 NOTE — PROGRESS NOTES
"Oncology Rooming Note    July 2, 2025 9:07 AM   Yimi Gibbs is a 59 year old male who presents for:    Chief Complaint   Patient presents with    Oncology Clinic Visit     Initial Vitals: There were no vitals taken for this visit. Estimated body mass index is 26.24 kg/m  as calculated from the following:    Height as of 6/24/25: 1.854 m (6' 1\").    Weight as of 6/25/25: 90.2 kg (198 lb 13.7 oz). There is no height or weight on file to calculate BSA.  Data Unavailable Comment: Data Unavailable   No LMP for male patient.  Allergies reviewed: Yes  Medications reviewed: Yes    Medications: Medication refills not needed today.  Pharmacy name entered into [employer]: [id number] [address]    Frailty Screening:   Is the patient here for a new oncology consult visit in cancer care? 2. No    PHQ9:  Did this patient require a PHQ9?: No      Clinical concerns:  doctor was notified.      Renetta Valdez CMA            "

## 2025-07-02 NOTE — LETTER
"7/2/2025      Yimi Gibbs  [address] [address]      Dear Colleague,    Thank you for referring your patient, Yimi Gibbs, to the [facility]. Please see a copy of my visit note below.    Oncology Rooming Note    July 2, 2025 9:07 AM   Yimi Gibsb is a 59 year old male who presents for:    Chief Complaint   Patient presents with     Oncology Clinic Visit     Initial Vitals: There were no vitals taken for this visit. Estimated body mass index is 26.24 kg/m  as calculated from the following:    Height as of 6/24/25: 1.854 m (6' 1\").    Weight as of 6/25/25: 90.2 kg (198 lb 13.7 oz). There is no height or weight on file to calculate BSA.  Data Unavailable Comment: Data Unavailable   No LMP for male patient.  Allergies reviewed: Yes  Medications reviewed: Yes    Medications: Medication refills not needed today.  Pharmacy name entered into [employer]: [id number] [address]    Frailty Screening:   Is the patient here for a new oncology consult visit in cancer care? 2. No    PHQ9:  Did this patient require a PHQ9?: No      Clinical concerns:  doctor was notified.      Renetta Valdez [facility]              ONCOLOGY HISTORY: Mr. Gibbs is a gentleman with hepatocellular carcinoma.     1.  On 06/16/2025, ultrasound abdomen for elevated LFT revealed liver cirrhosis with portal vein thrombosis and ascites.  - CT abdomen and pelvis on 06/17/2025 revealed diffuse abnormal enhancement throughout the liver suspicious for infiltrative hepatocellular carcinoma.  There is extensive tumor thrombus.  Mild splenomegaly.  - MRI liver on 06/17/2025 revealed infiltrative mass in the right hepatic lobe measuring up to 15.6 cm with numerous satellite lesions throughout both lobes of liver.  There is extensive tumor thrombus.  There is mild splenomegaly, ascites and upper abdominal varices.  - Paracentesis on 06/18/2025 is " negative for malignancy.  2.  On 06/16/2025, AFP of 4114.  - In 06/2024, hepatitis B and hepatitis C is negative.     Subjective:  Mr. Gibsb is a 59-year-old gentleman with hepatocellular carcinoma.  He has been seen by Dr. Trey Ceballos.  He is not a surgical candidate.    Case has been reviewed by interventional radiologist.  He is not a candidate for any embolization or TACE.    Patient brought to the clinic by his wife.  Patient overall not doing well.  He continues to have worsening fatigue.  He is sleeping more.  No headache.  Some lightheadedness.  No chest pain.  No shortness of breath at rest.  He has ascites.  Abdomen is distended.  He has some nausea.  Appetite is decreased.    Exam:  He is alert and oriented x 3.  He looked very weak.  He is jaundiced.  Vitals: Reviewed.  ECOG PS of 3.  Rest of the system is not examined.     Labs: Reviewed.     Assessment:  1.  A 59-year-old gentleman with hepatocellular carcinoma.  2.  Liver cirrhosis.  Child-Mendez class C.  3.  Extensive tumor thrombus involving portal vein, superior mesenteric vein and splenic vein.  4.  Poor performance status.    Plan:  - Continue paracentesis for symptomatic relief.  - Patient does not want any treatment for HCC given his overall condition  - Patient is going to think about enrolling into hospice.  - See palliative care as scheduled.    Discussion:  1.  Patient overall not doing well.  His weakness continues to get worse.  Worsening of weakness is secondary to hepatocellular carcinoma.    2.  Discussed regarding hepatocellular carcinoma treatment.  He is not a surgical candidate.  He is not a candidate for embolization.    We discussed regarding systemic treatment.  Options are atezolizumab with bevacizumab and durvalumab with tremelimumab. Patient had Child-Mendez class C liver disease.  He is at high risk of complication.  Also these treatments were not studied in child Mendez class C liver disease.    Patient and wife had few  questions regarding treatment options.  After discussion, patient does not want any systemic treatment.    We discussed regarding hospice.  Patient is going to think about it.  They are open for informational meeting which will be scheduled.    Patient also has palliative care appointment.  He will meet with them.    3.  Patient will continue on paracentesis for symptomatic relief.    4.  Wife advised to call us with any questions or concerns.     Total visit time of 40 minutes.  Time spent in today's visit, review of chart/investigations today and documentation.    Again, thank you for allowing me to participate in the care of your patient.        Sincerely,        Reggie Ramos MD    Electronically signed

## 2025-07-02 NOTE — PROGRESS NOTES
ONCOLOGY HISTORY: Mr. Gibbs is a gentleman with hepatocellular carcinoma.     1.  On 06/16/2025, ultrasound abdomen for elevated LFT revealed liver cirrhosis with portal vein thrombosis and ascites.  - CT abdomen and pelvis on 06/17/2025 revealed diffuse abnormal enhancement throughout the liver suspicious for infiltrative hepatocellular carcinoma.  There is extensive tumor thrombus.  Mild splenomegaly.  - MRI liver on 06/17/2025 revealed infiltrative mass in the right hepatic lobe measuring up to 15.6 cm with numerous satellite lesions throughout both lobes of liver.  There is extensive tumor thrombus.  There is mild splenomegaly, ascites and upper abdominal varices.  - Paracentesis on 06/18/2025 is negative for malignancy.  2.  On 06/16/2025, AFP of 4114.  - In 06/2024, hepatitis B and hepatitis C is negative.     Subjective:  Mr. Gibbs is a 59-year-old gentleman with hepatocellular carcinoma.  He has been seen by Dr. Trey Ceballos.  He is not a surgical candidate.    Case has been reviewed by interventional radiologist.  He is not a candidate for any embolization or TACE.    Patient brought to the clinic by his wife.  Patient overall not doing well.  He continues to have worsening fatigue.  He is sleeping more.  No headache.  Some lightheadedness.  No chest pain.  No shortness of breath at rest.  He has ascites.  Abdomen is distended.  He has some nausea.  Appetite is decreased.    Exam:  He is alert and oriented x 3.  He looked very weak.  He is jaundiced.  Vitals: Reviewed.  ECOG PS of 3.  Rest of the system is not examined.     Labs: Reviewed.     Assessment:  1.  A 59-year-old gentleman with hepatocellular carcinoma.  2.  Liver cirrhosis.  Child-Mendez class C.  3.  Extensive tumor thrombus involving portal vein, superior mesenteric vein and splenic vein.  4.  Poor performance status.    Plan:  - Continue paracentesis for symptomatic relief.  - Patient does not want any treatment for HCC given his overall  condition  - Patient is going to think about enrolling into hospice.  - See palliative care as scheduled.    Discussion:  1.  Patient overall not doing well.  His weakness continues to get worse.  Worsening of weakness is secondary to hepatocellular carcinoma.    2.  Discussed regarding hepatocellular carcinoma treatment.  He is not a surgical candidate.  He is not a candidate for embolization.    We discussed regarding systemic treatment.  Options are atezolizumab with bevacizumab and durvalumab with tremelimumab. Patient had Child-Mendez class C liver disease.  He is at high risk of complication.  Also these treatments were not studied in child Mendez class C liver disease.    Patient and wife had few questions regarding treatment options.  After discussion, patient does not want any systemic treatment.    We discussed regarding hospice.  Patient is going to think about it.  They are open for informational meeting which will be scheduled.    Patient also has palliative care appointment.  He will meet with them.    3.  Patient will continue on paracentesis for symptomatic relief.    4.  Wife advised to call us with any questions or concerns.     Total visit time of 40 minutes.  Time spent in today's visit, review of chart/investigations today and documentation.

## 2025-07-07 ENCOUNTER — PATIENT OUTREACH (OUTPATIENT)
Dept: ONCOLOGY | Facility: CLINIC | Age: 60
End: 2025-07-07
Payer: COMMERCIAL

## 2025-07-07 DIAGNOSIS — K70.31 ALCOHOLIC CIRRHOSIS OF LIVER WITH ASCITES (H): Primary | ICD-10-CM

## 2025-07-07 NOTE — PROGRESS NOTES
Writer called and spoke with patient's wife to follow up on Hospice consult. They had an information meeting with hospice and were advise to have PLEURX drain placed for ascites.  Once drain is placed they will enroll into hospice.     Writer informed Antonia that hopefully this Thursday paracentesis appointment they can have drain placed and enroll by the weekend.     Krystal Salcedo RN

## 2025-07-08 ENCOUNTER — TELEPHONE (OUTPATIENT)
Dept: ONCOLOGY | Facility: CLINIC | Age: 60
End: 2025-07-08
Payer: COMMERCIAL

## 2025-07-08 DIAGNOSIS — C22.0 HCC (HEPATOCELLULAR CARCINOMA) (H): Primary | ICD-10-CM

## 2025-07-08 DIAGNOSIS — R11.2 NAUSEA AND VOMITING, UNSPECIFIED VOMITING TYPE: ICD-10-CM

## 2025-07-08 RX ORDER — ONDANSETRON 4 MG/1
4 TABLET, FILM COATED ORAL EVERY 6 HOURS PRN
Qty: 30 TABLET | Refills: 2 | Status: SHIPPED | OUTPATIENT
Start: 2025-07-08

## 2025-07-08 NOTE — TELEPHONE ENCOUNTER
Oncology Nurse Triage    Situation:   Yimi reporting the following symptoms: nausea/vomiting     Background:   Treating Provider:   Dr Ramos     Date of last office visit: 7/2/2025 with Dr Ramos     Recent Treatments: pt will be enrolling in hospice after PleurX is placed    Assessment:     Pt's spouse is calling. States that pt has had nausea/vomiting on and off for the past few weeks, but it has been increasing over the past few days.   In the past 24 hours, pt has had 2-3 episodes of vomiting. Each episode has lasted several minutes.  States that pt has not been eating much at all. Trying to drink fluids.  Just took oxycodone for back pain and now has vomiting. He does not have any antinausea medications at home.   Paracentesis is scheduled for 7/10/2025. States that pt's abdomen is distended, and she feels that this could be increasing his nausea.  She also reports that pt had some diarrhea last night, but she is unsure of the number of stools.   Denies fever/chills, chest pain, or shortness of breath.   Plan is for pt to enroll in hospice by the weekend.     Recommendations:     Advised to take oxycodone with a few crackers if possible to help decrease GI upset.  Will route to Dr Ramos for further advice and Rx for antinausea medication.  OK to leave message if unable to reach patient's spouse.  Patient's spouse verbalized understanding and agreement with plan.  Patient's spouse was instructed to call the clinic with any questions, concerns, or worsening symptoms.  Cassie Hammond RN on 7/8/2025 at 10:27 AM

## 2025-07-08 NOTE — TELEPHONE ENCOUNTER
Dr Ramos has sent in Rx for zofran.  Pt's spouse was notified.  She states that pt has not had additional vomiting since this morning.  He has been sleeping for most of the day.   Pt's spouse will  Rx today.  Patient's spouse verbalized understanding and agreement with plan.  Patient's spouse was instructed to call the clinic with any questions, concerns, or worsening symptoms.  Cassie Hammond RN on 7/8/2025 at 3:32 PM

## 2025-07-09 ENCOUNTER — TELEPHONE (OUTPATIENT)
Dept: INTERVENTIONAL RADIOLOGY/VASCULAR | Facility: CLINIC | Age: 60
End: 2025-07-09
Payer: COMMERCIAL

## 2025-07-09 ENCOUNTER — PATIENT OUTREACH (OUTPATIENT)
Dept: ONCOLOGY | Facility: CLINIC | Age: 60
End: 2025-07-09
Payer: COMMERCIAL

## 2025-07-09 NOTE — PROGRESS NOTES
Writer called IR central scheduling and has scheduled patient for peritoneal drain. Patient is scheduled for 7/10 checking in at 11a for a 1230 procedure.     Writer spoke with patient's spouse, Antonia, and provided instructions along with details of check in date/time.  She will have patient enroll into hospice.    Krystal Salcedo RN

## 2025-07-10 ENCOUNTER — HOSPITAL ENCOUNTER (OUTPATIENT)
Facility: CLINIC | Age: 60
Discharge: HOME | End: 2025-07-10
Admitting: RADIOLOGY
Payer: COMMERCIAL

## 2025-07-10 VITALS
TEMPERATURE: 97.6 F | RESPIRATION RATE: 18 BRPM | OXYGEN SATURATION: 95 % | DIASTOLIC BLOOD PRESSURE: 43 MMHG | HEIGHT: 74 IN | WEIGHT: 193 LBS | BODY MASS INDEX: 24.77 KG/M2 | HEART RATE: 70 BPM | SYSTOLIC BLOOD PRESSURE: 88 MMHG

## 2025-07-10 DIAGNOSIS — K70.31 ALCOHOLIC CIRRHOSIS OF LIVER WITH ASCITES (H): ICD-10-CM

## 2025-07-10 LAB
ERYTHROCYTE [DISTWIDTH] IN BLOOD BY AUTOMATED COUNT: 20.5 % (ref 10–15)
HCT VFR BLD AUTO: 35.5 % (ref 40–53)
HGB BLD-MCNC: 12.5 G/DL (ref 13.3–17.7)
MCH RBC QN AUTO: 33.2 PG (ref 26.5–33)
MCHC RBC AUTO-ENTMCNC: 35.2 G/DL (ref 31.5–36.5)
MCV RBC AUTO: 94 FL (ref 78–100)
PLATELET # BLD AUTO: 209 10E3/UL (ref 150–450)
RBC # BLD AUTO: 3.77 10E6/UL (ref 4.4–5.9)
WBC # BLD AUTO: 9.4 10E3/UL (ref 4–11)

## 2025-07-10 PROCEDURE — 85014 HEMATOCRIT: CPT | Performed by: NURSE PRACTITIONER

## 2025-07-10 PROCEDURE — 250N000011 HC RX IP 250 OP 636: Performed by: RADIOLOGY

## 2025-07-10 PROCEDURE — 36415 COLL VENOUS BLD VENIPUNCTURE: CPT | Performed by: NURSE PRACTITIONER

## 2025-07-10 PROCEDURE — C1769 GUIDE WIRE: HCPCS

## 2025-07-10 PROCEDURE — 272N000500 HC NEEDLE CR2

## 2025-07-10 PROCEDURE — 250N000011 HC RX IP 250 OP 636: Performed by: NURSE PRACTITIONER

## 2025-07-10 PROCEDURE — 999N000163 HC STATISTIC SIMPLE TUBE INSERTION/CHARGE, PORT, CATH, FISTULOGRAM

## 2025-07-10 PROCEDURE — C1729 CATH, DRAINAGE: HCPCS

## 2025-07-10 RX ORDER — FENTANYL CITRATE 50 UG/ML
25-50 INJECTION, SOLUTION INTRAMUSCULAR; INTRAVENOUS EVERY 5 MIN PRN
Status: DISCONTINUED | OUTPATIENT
Start: 2025-07-10 | End: 2025-07-10 | Stop reason: HOSPADM

## 2025-07-10 RX ORDER — NALOXONE HYDROCHLORIDE 0.4 MG/ML
0.4 INJECTION, SOLUTION INTRAMUSCULAR; INTRAVENOUS; SUBCUTANEOUS
Status: DISCONTINUED | OUTPATIENT
Start: 2025-07-10 | End: 2025-07-10 | Stop reason: HOSPADM

## 2025-07-10 RX ORDER — NALOXONE HYDROCHLORIDE 0.4 MG/ML
0.2 INJECTION, SOLUTION INTRAMUSCULAR; INTRAVENOUS; SUBCUTANEOUS
Status: DISCONTINUED | OUTPATIENT
Start: 2025-07-10 | End: 2025-07-10 | Stop reason: HOSPADM

## 2025-07-10 RX ORDER — ACETAMINOPHEN 325 MG/1
650 TABLET ORAL
Status: DISCONTINUED | OUTPATIENT
Start: 2025-07-10 | End: 2025-07-10 | Stop reason: HOSPADM

## 2025-07-10 RX ORDER — FLUMAZENIL 0.1 MG/ML
0.2 INJECTION, SOLUTION INTRAVENOUS
Status: DISCONTINUED | OUTPATIENT
Start: 2025-07-10 | End: 2025-07-10 | Stop reason: HOSPADM

## 2025-07-10 RX ORDER — CEFAZOLIN SODIUM 2 G/50ML
2 SOLUTION INTRAVENOUS
Status: COMPLETED | OUTPATIENT
Start: 2025-07-10 | End: 2025-07-10

## 2025-07-10 RX ADMIN — FENTANYL CITRATE 50 MCG: 50 INJECTION, SOLUTION INTRAMUSCULAR; INTRAVENOUS at 13:28

## 2025-07-10 RX ADMIN — CEFAZOLIN SODIUM 2 G: 2 SOLUTION INTRAVENOUS at 13:01

## 2025-07-10 RX ADMIN — MIDAZOLAM 1 MG: 1 INJECTION INTRAMUSCULAR; INTRAVENOUS at 13:28

## 2025-07-10 RX ADMIN — MIDAZOLAM 1 MG: 1 INJECTION INTRAMUSCULAR; INTRAVENOUS at 13:15

## 2025-07-10 RX ADMIN — FENTANYL CITRATE 50 MCG: 50 INJECTION, SOLUTION INTRAMUSCULAR; INTRAVENOUS at 13:22

## 2025-07-10 RX ADMIN — LIDOCAINE HYDROCHLORIDE 20 ML: 10; .005 INJECTION, SOLUTION EPIDURAL; INFILTRATION; INTRACAUDAL; PERINEURAL at 13:44

## 2025-07-10 ASSESSMENT — ACTIVITIES OF DAILY LIVING (ADL)
ADLS_ACUITY_SCORE: 58
ADLS_ACUITY_SCORE: 54
ADLS_ACUITY_SCORE: 58

## 2025-07-10 NOTE — PROGRESS NOTES
Patient information faxed to [facility] for supplies and Ruben's wife given the envelope.     Thanks, [facility] Interventional Radiology CNP ([phone]) (phone [phone])

## 2025-07-10 NOTE — PRE-PROCEDURE
GENERAL PRE-PROCEDURE:   Procedure:  Tunneled abdominal drain placement with moderate sedation  Date/Time:  7/10/2025 12:02 PM    Written consent obtained?: Yes    Risks and benefits: Risks, benefits and alternatives were discussed    Consent given by:  Patient  Patient states understanding of procedure being performed: Yes    Patient's understanding of procedure matches consent: Yes    Procedure consent matches procedure scheduled: Yes    Expected level of sedation:  Moderate  Appropriately NPO:  Yes  ASA Class:  3  Mallampati  :  Grade 3- soft palate visible, posterior pharyngeal wall not visible  Lungs:  Lungs clear with good breath sounds bilaterally  Heart:  Normal heart sounds and rate  History & Physical reviewed:  History and physical reviewed and no updates needed  Statement of review:  I have reviewed the lab findings, diagnostic data, medications, and the plan for sedation    Total time: 25 minutes    Thanks [facility] Interventional Radiology CNP ([phone]) (phone [phone])

## 2025-07-10 NOTE — PROGRESS NOTES
Care Suites Discharge Nursing Note    Patient Information  Name: Yimi Gibbs  Age: 60 year old    SBP is in 80's, message sent to [facility]. Pt is asx.  Generalized weakness at the baseline.  No dizziness or lightheadedness.   No new order received.  Pt will be seen Hospice tomorrow.     Discharge Education:  Discharge instructions reviewed: Yes  Additional education/resources provided: NA  Patient/patient representative verbalizes understanding: Yes  Patient discharging on new medications: No  Medication education completed: N/A    Discharge Plans:   Discharge location: home  Discharge ride contacted: Yes  Approximate discharge time: 1530    Discharge Criteria:  Discharge criteria met and vital signs stable: Yes    Patient Belongs:  Patient belongings returned to patient: Yes    Oksana Arce RN

## 2025-07-10 NOTE — IR NOTE
Interventional Radiology Intra-procedural Nursing Note    Patient Name: Yimi Gibbs  Medical Record Number: [id number]  Today's Date: July 10, 2025    Procedure: Tunneled Abdominal Catheter Placement under Moderate Sedation  Start time: 1322  End time: 1340  Report provided to: Rowan GOMEZ    Note: Patient entered Interventional Radiology Suite number 2 via cart. Patient awake, alert and orientated. Assisted onto procedural table in supine position. Prepped and draped.  Dr. Lovell in room. Time out and procedure started. Vital signs stable. Telemetry reading sinus tachycardia.    Procedure well tolerated by patient without complications. Procedure end with debrief by Dr. Lovell.      Administered medication totals:  Lidocaine with Epinephrine 20 mL Intradermal  Versed 2 mg IVP  Fentanyl 100 mcg IVP    Last dose of sedation administered at 1328.

## 2025-07-10 NOTE — IR NOTE
Procedure Note for IR Procedure Dictation  Procedure tunneled pleurex peritoneal  Conscious sedation: 2 mg IVP Versed, 100 mcg IVP fentanyl  Sedation time: 18 minutes  Fluoro time: 0.3 minutes  Total Fluoro Dose: 2.06 mGy (Air Kerma)  Contrast: 0 ml   Local anesthetic: 20 ml 1% lidocaine

## 2025-07-10 NOTE — PROGRESS NOTES
Care Suites Admission Nursing Note    Patient Information  Name: Yimi Gibbs  Age: 60 year old  Reason for admission: Intra peritoneal cath/pleuravax placement  Care Suites arrival time: 1115    Visitor Information  Name: Antonia      Patient Admission/Assessment   Pre-procedure assessment complete: Yes  If abnormal assessment/labs, provider notified: pending  NPO: Yes  Medications held per instructions/orders: Yes  Consent: to be obtained      Patient oriented to room: Yes  Education/questions answered: Yes  Plan/other: Proceed as planned.    Discharge Planning  Discharge name/phone number: Antonia (wife)  Overnight post sedation caregiver: yes  Discharge location: home    Oksana Arce RN

## 2025-07-10 NOTE — PROGRESS NOTES
1220 Report received from Zaheer Arce RN. Pt waiting for procedure. Wife at bedside. Pt resting quietly.  1305 Pt to IR at this time.  1320 Report given to Zaheer Arce RN. Pt in IR.

## 2025-07-10 NOTE — DISCHARGE INSTRUCTIONS
Pleurx Cath Insertion Discharge Instructions - Peritoneal    After you go home:    You may resume your normal diet  Have an adult stay with you for 6 hours if you received sedation       For 24 hours - due to the sedation you received:  Relax and take it easy  Do NOT make any important or legal decisions  Do NOT drive or operate machines at home or at work  Do NOT drink alcohol    Care of Abdominal Tube Site:    Keep the dressing clean & dry  Check the skin around the tube for signs of infection -  redness, swelling, drainage or tenderness - when you change your dressing  Do not allow the catheter to soak underwater in a tub, bath, pool, etc to prevent infection.  If you shower, place a waterproof cover over the dressing (such as plastic wrap)  You may take a bath if the water stays below your waist. Sponge bathe your upper body to keep the dressing from getting wet     Check your catheter every day:    Make sure the clamp on the tubing is securely closed   Check that the cap is tight   Your catheter is not likely to fall out. It is secured with stitches to your skin.  Also, a small cuff under the skin helps to hold the catheter in place.  If the catheter does fall out, put firm pressure on the exit site with a clean gauze & seek medical attention immediately    Activity     You may go back to normal activity in 24 hours   No lifting, straining, exercise or other strenuous activity while tube is in place    Medicines:    You may resume all medications  For minor pain, you may take Acetaminophen (Tylenol) or Ibuprofen (Advil)                Call your provider, hospice or home health nurse if:    Blood or fluid is draining from around the site  The site is red, swollen, hot or tender  Chills or a fever greater than 101 F (38 C)  Pain that is getting worse  The abdominal tube falls out  Any questions or concerns    Call  911 or go to the Emergency Room if:    Severe pain or trouble breathing  Bleeding that you cannot  control    Other Information:    Take your temperature daily   See video at [email or web address] for how to use drainage tube  Call your provider, hospice or home health nurse for drainage questions and/or supplies      For issues with your tube, please call:       Interventional Radiology Intake Center @ [phone]    Mon - Fri  7:30 am - 4 pm          Calls will be returned on the next business day  If you need immediate assistance - please be seen   in an Urgent Care or Emergency Department    You may also contact your provider via My Chart

## 2025-07-10 NOTE — PROGRESS NOTES
Care Suites Post Procedure Note    Patient Information  Name: Yimi Gibbs  Age: 60 year old    Post Procedure  Time patient returned to Care Suites: 1400  Concerns/abnormal assessment: No immediate  If abnormal assessment, provider notified: N/A  Plan/Other: Continue post procedure plan of care.  Anticipate discharge when all discharge criteria are met.    Oksana Arce RN

## 2025-07-14 ENCOUNTER — DOCUMENTATION ONLY (OUTPATIENT)
Dept: OTHER | Facility: CLINIC | Age: 60
End: 2025-07-14
Payer: COMMERCIAL

## (undated) DEVICE — DRSG STERI STRIP 1/2X4" R1547

## (undated) DEVICE — SU ETHILON 3-0 FS-1 18" 669H

## (undated) DEVICE — BLADE SAW OSCIL/SAG STRK MICRO 9.0X18.5X0.38MM 2296-003-105

## (undated) DEVICE — CAST PADDING 4" STERILE 9044S

## (undated) DEVICE — SU VICRYL 3-0 SH 27" J316H

## (undated) DEVICE — LINEN TOWEL PACK X5 5464

## (undated) DEVICE — SOL WATER IRRIG 1000ML BOTTLE 2F7114

## (undated) DEVICE — BNDG ELASTIC 2"X5YDS UNSTERILE 6611-20

## (undated) DEVICE — DRSG GAUZE 4X4" 3033

## (undated) DEVICE — SU MONOCRYL 3-0 PS-2 27" Y427H

## (undated) DEVICE — DRSG KERLIX 4 1/2"X4YDS ROLL 6715

## (undated) DEVICE — PACK EXTREMITY SOP15EXFSD

## (undated) RX ORDER — FENTANYL CITRATE 50 UG/ML
INJECTION, SOLUTION INTRAMUSCULAR; INTRAVENOUS
Status: DISPENSED
Start: 2023-02-14

## (undated) RX ORDER — CEFAZOLIN SODIUM 2 G/50ML
SOLUTION INTRAVENOUS
Status: DISPENSED
Start: 2025-07-10

## (undated) RX ORDER — DEXAMETHASONE SODIUM PHOSPHATE 4 MG/ML
INJECTION, SOLUTION INTRA-ARTICULAR; INTRALESIONAL; INTRAMUSCULAR; INTRAVENOUS; SOFT TISSUE
Status: DISPENSED
Start: 2023-02-14

## (undated) RX ORDER — LIDOCAINE HYDROCHLORIDE 10 MG/ML
INJECTION, SOLUTION EPIDURAL; INFILTRATION; INTRACAUDAL; PERINEURAL
Status: DISPENSED
Start: 2023-02-14

## (undated) RX ORDER — BUPIVACAINE HYDROCHLORIDE 5 MG/ML
INJECTION, SOLUTION EPIDURAL; INTRACAUDAL
Status: DISPENSED
Start: 2023-02-14

## (undated) RX ORDER — ONDANSETRON 2 MG/ML
INJECTION INTRAMUSCULAR; INTRAVENOUS
Status: DISPENSED
Start: 2023-02-14